# Patient Record
Sex: FEMALE | Race: WHITE | NOT HISPANIC OR LATINO | Employment: OTHER | URBAN - METROPOLITAN AREA
[De-identification: names, ages, dates, MRNs, and addresses within clinical notes are randomized per-mention and may not be internally consistent; named-entity substitution may affect disease eponyms.]

---

## 2018-01-01 ENCOUNTER — TRANSFERRED RECORDS (OUTPATIENT)
Dept: MULTI SPECIALTY CLINIC | Facility: CLINIC | Age: 73
End: 2018-01-01

## 2018-07-02 ENCOUNTER — TRANSFERRED RECORDS (OUTPATIENT)
Dept: HEALTH INFORMATION MANAGEMENT | Facility: CLINIC | Age: 73
End: 2018-07-02

## 2018-07-02 LAB
CHOLEST SERPL-MCNC: 261 MG/DL (ref 131–200)
CREAT SERPL-MCNC: 0.68 MG/DL (ref 0.55–1.3)
GFR SERPL CREATININE-BSD FRML MDRD: >60 ML/MIN/1.73 M2
GLUCOSE SERPL-MCNC: 91 MG/DL (ref 74–106)
HDLC SERPL-MCNC: 77 MG/DL (ref 40–60)
LDLC SERPL CALC-MCNC: 158 MG/DL (ref 0–130)
POTASSIUM SERPL-SCNC: 4.3 MMOL/L (ref 3.6–5)
TRIGL SERPL-MCNC: 130 MG/DL (ref 0–150)

## 2019-10-30 ENCOUNTER — ALLIED HEALTH/NURSE VISIT (OUTPATIENT)
Dept: NURSING | Facility: CLINIC | Age: 74
End: 2019-10-30
Payer: MEDICARE

## 2019-10-30 DIAGNOSIS — Z23 NEED FOR PROPHYLACTIC VACCINATION AND INOCULATION AGAINST INFLUENZA: Primary | ICD-10-CM

## 2019-10-30 PROCEDURE — G0008 ADMIN INFLUENZA VIRUS VAC: HCPCS

## 2019-10-30 PROCEDURE — 90662 IIV NO PRSV INCREASED AG IM: CPT

## 2019-10-30 PROCEDURE — 99207 ZZC NO CHARGE NURSE ONLY: CPT

## 2020-07-06 ENCOUNTER — OFFICE VISIT (OUTPATIENT)
Dept: FAMILY MEDICINE | Facility: CLINIC | Age: 75
End: 2020-07-06
Payer: MEDICARE

## 2020-07-06 VITALS
OXYGEN SATURATION: 97 % | SYSTOLIC BLOOD PRESSURE: 172 MMHG | HEIGHT: 60 IN | DIASTOLIC BLOOD PRESSURE: 106 MMHG | BODY MASS INDEX: 37.69 KG/M2 | HEART RATE: 106 BPM | WEIGHT: 192 LBS | TEMPERATURE: 98.6 F

## 2020-07-06 DIAGNOSIS — Z00.00 ENCOUNTER FOR MEDICARE ANNUAL WELLNESS EXAM: Primary | ICD-10-CM

## 2020-07-06 DIAGNOSIS — F41.9 ANXIETY: ICD-10-CM

## 2020-07-06 DIAGNOSIS — Z13.220 LIPID SCREENING: ICD-10-CM

## 2020-07-06 DIAGNOSIS — Z13.1 SCREENING FOR DIABETES MELLITUS: ICD-10-CM

## 2020-07-06 DIAGNOSIS — F33.41 RECURRENT MAJOR DEPRESSIVE DISORDER, IN PARTIAL REMISSION (H): ICD-10-CM

## 2020-07-06 DIAGNOSIS — I10 BENIGN ESSENTIAL HYPERTENSION: ICD-10-CM

## 2020-07-06 DIAGNOSIS — E66.01 MORBID OBESITY (H): ICD-10-CM

## 2020-07-06 PROCEDURE — 96127 BRIEF EMOTIONAL/BEHAV ASSMT: CPT | Performed by: NURSE PRACTITIONER

## 2020-07-06 PROCEDURE — 36415 COLL VENOUS BLD VENIPUNCTURE: CPT | Performed by: NURSE PRACTITIONER

## 2020-07-06 PROCEDURE — 80053 COMPREHEN METABOLIC PANEL: CPT | Performed by: NURSE PRACTITIONER

## 2020-07-06 PROCEDURE — 80061 LIPID PANEL: CPT | Performed by: NURSE PRACTITIONER

## 2020-07-06 PROCEDURE — G0438 PPPS, INITIAL VISIT: HCPCS | Performed by: NURSE PRACTITIONER

## 2020-07-06 RX ORDER — ESCITALOPRAM OXALATE 10 MG/1
10 TABLET ORAL DAILY
Qty: 90 TABLET | Refills: 3 | Status: SHIPPED | OUTPATIENT
Start: 2020-07-06 | End: 2021-07-06

## 2020-07-06 RX ORDER — LORAZEPAM 0.5 MG/1
0.5 TABLET ORAL DAILY PRN
Qty: 30 TABLET | Refills: 0 | Status: SHIPPED | OUTPATIENT
Start: 2020-07-06 | End: 2020-10-08

## 2020-07-06 RX ORDER — ESCITALOPRAM OXALATE 10 MG/1
10 TABLET ORAL DAILY
COMMUNITY
End: 2020-07-06

## 2020-07-06 RX ORDER — LISINOPRIL 5 MG/1
5 TABLET ORAL DAILY
COMMUNITY
End: 2021-02-12

## 2020-07-06 RX ORDER — VALACYCLOVIR HYDROCHLORIDE 1 G/1
1000 TABLET, FILM COATED ORAL 2 TIMES DAILY
COMMUNITY
End: 2022-10-13

## 2020-07-06 RX ORDER — LISINOPRIL 5 MG/1
5 TABLET ORAL DAILY
Qty: 90 TABLET | Refills: 3 | Status: SHIPPED | OUTPATIENT
Start: 2020-07-06 | End: 2021-06-07

## 2020-07-06 ASSESSMENT — ANXIETY QUESTIONNAIRES
1. FEELING NERVOUS, ANXIOUS, OR ON EDGE: SEVERAL DAYS
2. NOT BEING ABLE TO STOP OR CONTROL WORRYING: SEVERAL DAYS
4. TROUBLE RELAXING: SEVERAL DAYS
6. BECOMING EASILY ANNOYED OR IRRITABLE: SEVERAL DAYS
5. BEING SO RESTLESS THAT IT IS HARD TO SIT STILL: SEVERAL DAYS
GAD7 TOTAL SCORE: 7
4. TROUBLE RELAXING: SEVERAL DAYS
1. FEELING NERVOUS, ANXIOUS, OR ON EDGE: SEVERAL DAYS
GAD7 TOTAL SCORE: 7
7. FEELING AFRAID AS IF SOMETHING AWFUL MIGHT HAPPEN: SEVERAL DAYS
3. WORRYING TOO MUCH ABOUT DIFFERENT THINGS: SEVERAL DAYS
IF YOU CHECKED OFF ANY PROBLEMS ON THIS QUESTIONNAIRE, HOW DIFFICULT HAVE THESE PROBLEMS MADE IT FOR YOU TO DO YOUR WORK, TAKE CARE OF THINGS AT HOME, OR GET ALONG WITH OTHER PEOPLE: SOMEWHAT DIFFICULT
2. NOT BEING ABLE TO STOP OR CONTROL WORRYING: SEVERAL DAYS
6. BECOMING EASILY ANNOYED OR IRRITABLE: SEVERAL DAYS
5. BEING SO RESTLESS THAT IT IS HARD TO SIT STILL: SEVERAL DAYS
7. FEELING AFRAID AS IF SOMETHING AWFUL MIGHT HAPPEN: SEVERAL DAYS
3. WORRYING TOO MUCH ABOUT DIFFERENT THINGS: SEVERAL DAYS

## 2020-07-06 ASSESSMENT — ASTHMA QUESTIONNAIRES
QUESTION_1 LAST FOUR WEEKS HOW MUCH OF THE TIME DID YOUR ASTHMA KEEP YOU FROM GETTING AS MUCH DONE AT WORK, SCHOOL OR AT HOME: NONE OF THE TIME
ACUTE_EXACERBATION_TODAY: NO
QUESTION_3 LAST FOUR WEEKS HOW OFTEN DID YOUR ASTHMA SYMPTOMS (WHEEZING, COUGHING, SHORTNESS OF BREATH, CHEST TIGHTNESS OR PAIN) WAKE YOU UP AT NIGHT OR EARLIER THAN USUAL IN THE MORNING: NOT AT ALL
QUESTION_5 LAST FOUR WEEKS HOW WOULD YOU RATE YOUR ASTHMA CONTROL: COMPLETELY CONTROLLED
ACT_TOTALSCORE: 25
QUESTION_4 LAST FOUR WEEKS HOW OFTEN HAVE YOU USED YOUR RESCUE INHALER OR NEBULIZER MEDICATION (SUCH AS ALBUTEROL): NOT AT ALL
QUESTION_2 LAST FOUR WEEKS HOW OFTEN HAVE YOU HAD SHORTNESS OF BREATH: NOT AT ALL

## 2020-07-06 ASSESSMENT — PATIENT HEALTH QUESTIONNAIRE - PHQ9: SUM OF ALL RESPONSES TO PHQ QUESTIONS 1-9: 3

## 2020-07-06 ASSESSMENT — ACTIVITIES OF DAILY LIVING (ADL): CURRENT_FUNCTION: NO ASSISTANCE NEEDED

## 2020-07-06 ASSESSMENT — MIFFLIN-ST. JEOR: SCORE: 1282.41

## 2020-07-06 NOTE — LETTER
[facility]  [address], [place] [address] [phone]                    July 9, 2020    Cami BA Yue  [address] [facility] [address]      Dear Cami,    Here is a summary of your recent test results:    -LDL(bad) cholesterol level is elevated, and your triglycerides are elevated which can increase your heart disease risk.  A diet high in fat and simple carbohydrates, genetics and being overweight can contribute to this. ADVISE: exercising 150 minutes of aerobic exercise per week (30 minutes for 5 days per week or 50 minutes for 3 days per week are options), eating a low saturated fat/low carbohydrate diet, and omega-3 fatty acids (fish oil) 0094-4566 mg daily are helpful to improve this. In 6 months, you should recheck your fasting cholesterol panel by scheduling a lab-only appointment.     -Liver and gallbladder tests are normal (ALT,AST, Alk phos, bilirubin), kidney function is normal (Cr, GFR), sodium is normal, potassium is normal, calcium is normal, glucose is normal.     Right now risk calculator for cholesterol is scoring high, but this is likely due to uncontrolled BP at this time. We will plan to recheck levels in a few months and follow result.     Your test results are enclosed.      Please contact me if you have any questions.    In addition, here is a list of due or overdue Health Maintenance reminders.    Health Maintenance Due   Topic Date Due     Osteoporosis Screening  1945     Depression Action Plan  1945     Colorectal Cancer Screening  07/12/1953     Diptheria Tetanus Pertussis (DTAP/TDAP/TD) Vaccine (1 - Tdap) 07/12/1968     Zoster (Shingles) Vaccine (1 of 2) 07/12/1993     Pneumococcal Vaccine (1 of 2 - PCV13) 07/12/2008       Please call us at [phone] (or use [employer]) to address the above recommendations.            Thank you very much for trusting [facility]..     Healthy regards,      Tisha Trevino,  [facility]       Results for orders placed or performed in visit on 07/06/20   Lipid panel reflex to direct LDL Fasting     Status: Abnormal   Result Value Ref Range    Cholesterol 271 (H) <200 mg/dL    Triglycerides 214 (H) <150 mg/dL    HDL Cholesterol 69 >49 mg/dL    LDL Cholesterol Calculated 159 (H) <100 mg/dL    Non HDL Cholesterol 202 (H) <130 mg/dL   Comprehensive metabolic panel (BMP + Alb, Alk Phos, ALT, AST, Total. Bili, TP)     Status: None   Result Value Ref Range    Sodium 140 133 - 144 mmol/L    Potassium 4.2 3.4 - 5.3 mmol/L    Chloride 107 94 - 109 mmol/L    Carbon Dioxide 24 20 - 32 mmol/L    Anion Gap 9 3 - 14 mmol/L    Glucose 82 70 - 99 mg/dL    Urea Nitrogen 12 7 - 30 mg/dL    Creatinine 0.76 0.52 - 1.04 mg/dL    GFR Estimate 75 >60 mL/min/[1.73_m2]    GFR Estimate If Black 87 >60 mL/min/[1.73_m2]    Calcium 8.9 8.5 - 10.1 mg/dL    Bilirubin Total 0.3 0.2 - 1.3 mg/dL    Albumin 3.6 3.4 - 5.0 g/dL    Protein Total 7.7 6.8 - 8.8 g/dL    Alkaline Phosphatase 92 40 - 150 U/L    ALT 23 0 - 50 U/L    AST 18 0 - 45 U/L

## 2020-07-06 NOTE — PROGRESS NOTES
"SUBJECTIVE:   Cami Turner is a 74 year old female who presents for Preventive Visit.    Pt moved here from [place] 04/2019.    Are you in the first 12 months of your Medicare coverage?  No    Healthy Habits:    In general, how would you rate your overall health?  Very good    Frequency of exercise:  None    Do you usually eat at least 4 servings of fruit and vegetables a day, include whole grains    & fiber and avoid regularly eating high fat or \"junk\" foods?  Yes    Taking medications regularly:  Yes (when has them - ran out 1 year ago)    Barriers to taking medications:  Other ( was having health issues so did not get appt til now)    Medication side effects:  None    Ability to successfully perform activities of daily living:  No assistance needed    Home Safety:  No safety concerns identified    Hearing Impairment:  No hearing concerns    In the past 6 months, have you been bothered by leaking of urine?  No    In general, how would you rate your overall mental or emotional health?  Good      PHQ-2 Total Score:    Additional concerns today:  Yes (renew medication)    Do you feel safe in your environment? Yes    Have you ever done Advance Care Planning? (For example, a Health Directive, POLST, or a discussion with a medical provider or your loved ones about your wishes): Yes, patient states has an Advance Care Planning document and will bring a copy to the clinic.      Fall risk  Fallen 2 or more times in the past year?: No  Any fall with injury in the past year?: No  Cognitive Screening   1) Repeat 3 items (Leader, Season, Table)    2) Clock draw: NORMAL  3) 3 item recall: Recalls 3 objects  Results: 3 items recalled: COGNITIVE IMPAIRMENT LESS LIKELY    Mini-CogTM Copyright DOMINICK Correia. Licensed by the author for use in [facility]; reprinted with permission (hope@.[facility]). All rights reserved.      Do you have sleep apnea, excessive snoring or daytime drowsiness?: no    Reviewed and updated as " needed this visit by clinical staff  Tobacco  Allergies  Meds  Problems  Med Hx  Surg Hx  Fam Hx  Soc Hx          Reviewed and updated as needed this visit by Provider  Allergies  Meds  Problems        Social History     Tobacco Use     Smoking status: Never Smoker     Smokeless tobacco: Never Used   Substance Use Topics     Alcohol use: Yes     Comment: 3 drinks per week     If you drink alcohol do you typically have >3 drinks per day or >7 drinks per week? No    Alcohol Use 7/6/2020   Prescreen: >3 drinks/day or >7 drinks/week? No       Mammogram done on this date: 2 years ago (approximately), by this group: will be getting results faxed from previous clinic, results were normal.       Has been out of all mediation for approx 1 year    Hypertension Follow-up - Lisinopril - 5mg      Do you check your blood pressure regularly outside of the clinic? No     Are you following a low salt diet? Yes - somewhat low    Are your blood pressures ever more than 140 on the top number (systolic) OR more   than 90 on the bottom number (diastolic), for example 140/90? Unsure- not checked since ran out    Depression and Anxiety Follow-Up - Escitalopram  - 10mg, Ativan 0.5mg-PRNf    How are you doing with your depression since your last visit? Worsened since being off meds    How are you doing with your anxiety since your last visit?  Worsened since being off meds    Are you having other symptoms that might be associated with depression or anxiety? No    Have you had a significant life event? OTHER: taking care of       Do you have any concerns with your use of alcohol or other drugs? No    Social History     Tobacco Use     Smoking status: Never Smoker     Smokeless tobacco: Never Used   Substance Use Topics     Alcohol use: Yes     Comment: 3 drinks per week     Drug use: Never     PHQ 7/6/2020   PHQ-9 Total Score 3   Q9: Thoughts of better off dead/self-harm past 2 weeks Not at all     No flowsheet data  found.  Last PHQ-9 7/6/2020   1.  Little interest or pleasure in doing things 1   2.  Feeling down, depressed, or hopeless 1   3.  Trouble falling or staying asleep, or sleeping too much 0   4.  Feeling tired or having little energy 0   5.  Poor appetite or overeating 1   6.  Feeling bad about yourself 0   7.  Trouble concentrating 0   8.  Moving slowly or restless 0   Q9: Thoughts of better off dead/self-harm past 2 weeks 0   PHQ-9 Total Score 3   Difficulty at work, home, or with people Somewhat difficult     No flowsheet data found.       Medication Followup of Acyclovir - PRN - Cold sores    Taking Medication as prescribed: yes    Side Effects:  None    Medication Helping Symptoms:  yes    Asthma Follow-Up    Was ACT completed today?  Yes    ACT Total Scores 7/6/2020   ACT TOTAL SCORE (Goal Greater than or Equal to 20) 25   In the past 12 months, how many times did you visit the emergency room for your asthma without being admitted to the hospital? 0   In the past 12 months, how many times were you hospitalized overnight because of your asthma? 0         How many days per week do you miss taking your asthma controller medication?  I do not have an asthma controller medication    Please describe any recent triggers for your asthma: Patient is unaware of triggers - Spring and Fall    Have you had any Emergency Room Visits, Urgent Care Visits, or Hospital Admissions since your last office visit?  No        Suicide Assessment Five-step Evaluation and Treatment (SAFE-T)      Current providers sharing in care for this patient include:   Patient Care Team:  Clinic, [facility] as PCP - General    The following health maintenance items are reviewed in Epic and correct as of today:  Health Maintenance   Topic Date Due     DEXA  1945     ADVANCE CARE PLANNING  1945     COLORECTAL CANCER SCREENING  07/12/1953     DTAP/TDAP/TD IMMUNIZATION (1 - Tdap) 07/12/1968     LIPID  07/12/1988     ZOSTER  IMMUNIZATION (1 of 2) 07/12/1993     MEDICARE ANNUAL WELLNESS VISIT  07/12/2008     FALL RISK ASSESSMENT  07/12/2008     PNEUMOCOCCAL IMMUNIZATION 65+ LOW/MEDIUM RISK (1 of 2 - PCV13) 07/12/2008     PHQ-2  01/01/2020     INFLUENZA VACCINE (1) 09/01/2020     IPV IMMUNIZATION  Aged Out     MENINGITIS IMMUNIZATION  Aged Out     Lab work is in process  Up to date on screenings per report-await records transfer    Review of Systems  Constitutional, HEENT, cardiovascular, pulmonary, GI, , musculoskeletal, neuro, skin, endocrine and psych systems are negative, except as otherwise noted.    OBJECTIVE:   BP (!) 172/106 (BP Location: Right arm, Patient Position: Chair, Cuff Size: Adult Large)   Pulse 106   Temp 98.6  F (37  C) (Tympanic)   Ht 1.524 m (5')   Wt 87.1 kg (192 lb)   SpO2 97%   Breastfeeding No   BMI 37.50 kg/m   Estimated body mass index is 37.5 kg/m  as calculated from the following:    Height as of this encounter: 1.524 m (5').    Weight as of this encounter: 87.1 kg (192 lb).  Physical Exam  GENERAL APPEARANCE: healthy, alert and no distress  EYES: Eyes grossly normal to inspection, PERRL and conjunctivae and sclerae normal  HENT: ear canals and TM's normal, nose and mouth without ulcers or lesions, oropharynx clear and oral mucous membranes moist  NECK: no adenopathy, no asymmetry, masses, or scars and thyroid normal to palpation  RESP: lungs clear to auscultation - no rales, rhonchi or wheezes  BREAST: normal without masses, tenderness or nipple discharge and no palpable axillary masses or adenopathy  CV: regular rate and rhythm, normal S1 S2, no S3 or S4, no murmur, click or rub, no peripheral edema and peripheral pulses strong  ABDOMEN: soft, nontender, no hepatosplenomegaly, no masses and bowel sounds normal  MS: no musculoskeletal defects are noted and gait is age appropriate without ataxia  SKIN: no suspicious lesions or rashes  NEURO: Normal strength and tone, sensory exam grossly normal,  mentation intact and speech normal  PSYCH: mentation appears normal and affect normal/bright    Diagnostic Test Results:  Labs reviewed in Epic    ASSESSMENT / PLAN:   1. Encounter for Medicare annual wellness exam    2. Benign essential hypertension  Refill provided, will monitor BP and let us know if remains high.  - lisinopril (ZESTRIL) 5 MG tablet; Take 1 tablet (5 mg) by mouth daily  Dispense: 90 tablet; Refill: 3    3. Morbid obesity (H)  Continue healthy diet and exercise.     4. Screening for diabetes mellitus   Comprehensive metabolic panel (BMP + Alb, Alk Phos, ALT, AST, Total. Bili, TP)    5. Lipid screening  - Lipid panel reflex to direct LDL Fasting    6. Anxiety  Refill provided. Discussed short term use Lorazepam due to Beers criteria.  - LORazepam (ATIVAN) 0.5 MG tablet; Take 1 tablet (0.5 mg) by mouth daily as needed for anxiety  Dispense: 30 tablet; Refill: 0  - escitalopram (LEXAPRO) 10 MG tablet; Take 1 tablet (10 mg) by mouth daily  Dispense: 90 tablet; Refill: 3    7. Recurrent major depressive disorder, in partial remission (H)  Refill today. Titrate for better anxiety control.  - escitalopram (LEXAPRO) 10 MG tablet; Take 1 tablet (10 mg) by mouth daily  Dispense: 90 tablet; Refill: 3    COUNSELING:  Reviewed preventive health counseling, as reflected in patient instructions  Special attention given to:       Regular exercise       Healthy diet/nutrition       Fall risk prevention    Estimated body mass index is 37.5 kg/m  as calculated from the following:    Height as of this encounter: 1.524 m (5').    Weight as of this encounter: 87.1 kg (192 lb).    Weight management plan: Discussed healthy diet and exercise guidelines     reports that she has never smoked. She has never used smokeless tobacco.      Appropriate preventive services were discussed with this patient, including applicable screening as appropriate for cardiovascular disease, diabetes, osteopenia/osteoporosis, and glaucoma.  As  appropriate for age/gender, discussed screening for colorectal cancer, prostate cancer, breast cancer, and cervical cancer. Checklist reviewing preventive services available has been given to the patient.    Reviewed patients plan of care and provided an AVS. The Basic Care Plan (routine screening as documented in Health Maintenance) for Cami meets the Care Plan requirement. This Care Plan has been established and reviewed with the Patient.    Counseling Resources:  ATP IV Guidelines  Pooled Cohorts Equation Calculator  Breast Cancer Risk Calculator  FRAX Risk Assessment  ICSI Preventive Guidelines  Dietary Guidelines for Americans, 2010  USDA's MyPlate  ASA Prophylaxis  Lung CA Screening    Tisha Trevino, ZOE  [facility]    Identified Health Risks:

## 2020-07-06 NOTE — PATIENT INSTRUCTIONS
Patient Education   Personalized Prevention Plan  You are due for the preventive services outlined below.  Your care team is available to assist you in scheduling these services.  If you have already completed any of these items, please share that information with your care team to update in your medical record.  Health Maintenance Due   Topic Date Due     Osteoporosis Screening  1945     Discuss Advance Care Planning  1945     Colorectal Cancer Screening  07/12/1953     Diptheria Tetanus Pertussis (DTAP/TDAP/TD) Vaccine (1 - Tdap) 07/12/1968     Cholesterol Lab  07/12/1988     Zoster (Shingles) Vaccine (1 of 2) 07/12/1993     Annual Wellness Visit  07/12/2008     FALL RISK ASSESSMENT  07/12/2008     Pneumococcal Vaccine (1 of 2 - PCV13) 07/12/2008     PHQ-2  01/01/2020

## 2020-07-06 NOTE — Clinical Note
Mammogram done on this date: 2 years ago (approximately), by this group: [place], will be getting results faxed from previous clinic, results were normal.

## 2020-07-07 LAB
ALBUMIN SERPL-MCNC: 3.6 G/DL (ref 3.4–5)
ALP SERPL-CCNC: 92 U/L (ref 40–150)
ALT SERPL W P-5'-P-CCNC: 23 U/L (ref 0–50)
ANION GAP SERPL CALCULATED.3IONS-SCNC: 9 MMOL/L (ref 3–14)
AST SERPL W P-5'-P-CCNC: 18 U/L (ref 0–45)
BILIRUB SERPL-MCNC: 0.3 MG/DL (ref 0.2–1.3)
BUN SERPL-MCNC: 12 MG/DL (ref 7–30)
CALCIUM SERPL-MCNC: 8.9 MG/DL (ref 8.5–10.1)
CHLORIDE SERPL-SCNC: 107 MMOL/L (ref 94–109)
CHOLEST SERPL-MCNC: 271 MG/DL
CO2 SERPL-SCNC: 24 MMOL/L (ref 20–32)
CREAT SERPL-MCNC: 0.76 MG/DL (ref 0.52–1.04)
GFR SERPL CREATININE-BSD FRML MDRD: 75 ML/MIN/{1.73_M2}
GLUCOSE SERPL-MCNC: 82 MG/DL (ref 70–99)
HDLC SERPL-MCNC: 69 MG/DL
LDLC SERPL CALC-MCNC: 159 MG/DL
NONHDLC SERPL-MCNC: 202 MG/DL
POTASSIUM SERPL-SCNC: 4.2 MMOL/L (ref 3.4–5.3)
PROT SERPL-MCNC: 7.7 G/DL (ref 6.8–8.8)
SODIUM SERPL-SCNC: 140 MMOL/L (ref 133–144)
TRIGL SERPL-MCNC: 214 MG/DL

## 2020-07-07 ASSESSMENT — ANXIETY QUESTIONNAIRES: GAD7 TOTAL SCORE: 7

## 2020-07-07 ASSESSMENT — ASTHMA QUESTIONNAIRES: ACT_TOTALSCORE: 25

## 2020-07-09 NOTE — RESULT ENCOUNTER NOTE
Please send letter:    -LDL(bad) cholesterol level is elevated, and your triglycerides are elevated which can increase your heart disease risk.  A diet high in fat and simple carbohydrates, genetics and being overweight can contribute to this. ADVISE: exercising 150 minutes of aerobic exercise per week (30 minutes for 5 days per week or 50 minutes for 3 days per week are options), eating a low saturated fat/low carbohydrate diet, and omega-3 fatty acids (fish oil) 6254-1775 mg daily are helpful to improve this. In 6 months, you should recheck your fasting cholesterol panel by scheduling a lab-only appointment.    -Liver and gallbladder tests are normal (ALT,AST, Alk phos, bilirubin), kidney function is normal (Cr, GFR), sodium is normal, potassium is normal, calcium is normal, glucose is normal.    Right now risk calculator for cholesterol is scoring high, but this is likely due to uncontrolled BP at this time. We will plan to recheck levels in a few months and follow result.    Healthy Regards,    Tisha Trevino, CNP

## 2020-09-30 ENCOUNTER — ALLIED HEALTH/NURSE VISIT (OUTPATIENT)
Dept: NURSING | Facility: CLINIC | Age: 75
End: 2020-09-30
Payer: MEDICARE

## 2020-09-30 DIAGNOSIS — Z23 NEED FOR PROPHYLACTIC VACCINATION AND INOCULATION AGAINST INFLUENZA: Primary | ICD-10-CM

## 2020-09-30 PROCEDURE — 90662 IIV NO PRSV INCREASED AG IM: CPT

## 2020-09-30 PROCEDURE — 99207 ZZC NO CHARGE NURSE ONLY: CPT

## 2020-09-30 PROCEDURE — G0008 ADMIN INFLUENZA VIRUS VAC: HCPCS

## 2020-10-08 DIAGNOSIS — F41.9 ANXIETY: ICD-10-CM

## 2020-10-08 RX ORDER — LORAZEPAM 0.5 MG/1
0.5 TABLET ORAL DAILY PRN
Qty: 30 TABLET | Refills: 0 | Status: SHIPPED | OUTPATIENT
Start: 2020-10-08 | End: 2021-01-22

## 2020-10-08 NOTE — TELEPHONE ENCOUNTER
LORazepam (ATIVAN) 0.5 MG tablet  Last Written Prescription Date:  7/6/2020  Last Fill Quantity: 30,  # refills: 0   Last office visit: 7/6/2020 with prescribing provider:  Tisha Trevino CNP   Future Office Visit: NA     routing to provider for review.     Regi Harrison MA on 10/8/2020 at 11:57 AM

## 2020-10-12 DIAGNOSIS — F41.9 ANXIETY: ICD-10-CM

## 2020-10-12 NOTE — TELEPHONE ENCOUNTER
Reason for Call:  Medication or medication refill:    Do you use a [facility]?  Name [facility]    Name of the medication requested: LORazepam (ATIVAN) 0.5 MG tablet    Other request: no    Can we leave a detailed message on this number? YES    Phone number patient can be reached at: Home number on file [phone] (home)    Best Time: anytime    Call taken on 10/12/2020 at 4:49 PM by Lenore Mercedes

## 2020-10-13 NOTE — TELEPHONE ENCOUNTER
LORazepam (ATIVAN) 0.5 MG tablet          Last Written Prescription Date:  10-8-20  Last Fill Quantity: 30 tablet,  # refills: 0   Last office visit: 7/6/2020 with prescribing provider:  felipe Trevino     Future Office Visit:        Routing refill request to provider for review/approval because:  Drug not on the [facility], P or [facility] refill protocol or controlled substance

## 2020-10-14 RX ORDER — LORAZEPAM 0.5 MG/1
0.5 TABLET ORAL DAILY PRN
Qty: 30 TABLET | Refills: 0 | OUTPATIENT
Start: 2020-10-14

## 2021-01-21 DIAGNOSIS — F41.9 ANXIETY: ICD-10-CM

## 2021-01-21 NOTE — TELEPHONE ENCOUNTER
LORazepam (ATIVAN) 0.5 MG tablet            Last Written Prescription Date:  10-8-20  Last Fill Quantity: 30 tablet,  # refills: 0   Last office visit: 7/6/2020 with prescribing provider:  felipe Trevino.   Future Office Visit:        Routing refill request to provider for review/approval because:  Drug not on the [facility], P or [facility] refill protocol or controlled substance

## 2021-01-22 RX ORDER — LORAZEPAM 0.5 MG/1
0.5 TABLET ORAL DAILY PRN
Qty: 30 TABLET | Refills: 0 | Status: SHIPPED | OUTPATIENT
Start: 2021-01-22 | End: 2021-03-09

## 2021-02-09 DIAGNOSIS — I10 BENIGN ESSENTIAL HYPERTENSION: Primary | ICD-10-CM

## 2021-02-09 DIAGNOSIS — I10 BENIGN ESSENTIAL HYPERTENSION: ICD-10-CM

## 2021-02-09 RX ORDER — LISINOPRIL 5 MG/1
5 TABLET ORAL DAILY
Qty: 90 TABLET | Refills: 3 | Status: CANCELLED | OUTPATIENT
Start: 2021-02-09

## 2021-02-11 NOTE — TELEPHONE ENCOUNTER
PT has been out for 10 days. PT needs sent to [place], [facility] hasn't sent new one.  -Nabila Lucas

## 2021-02-12 RX ORDER — LISINOPRIL 5 MG/1
5 TABLET ORAL DAILY
Qty: 90 TABLET | Refills: 0 | Status: SHIPPED | OUTPATIENT
Start: 2021-02-12 | End: 2021-06-07

## 2021-03-05 DIAGNOSIS — F41.9 ANXIETY: ICD-10-CM

## 2021-03-05 NOTE — TELEPHONE ENCOUNTER
LORazepam (ATIVAN) 0.5 MG tablet            Last Written Prescription Date:  1.22.21  Last Fill Quantity: 30 tablet,  # refills: 0   Last office visit: 7/6/2020 with prescribing provider:  ADALI ZAMUDIO NP       Future Office Visit:        Routing refill request to provider for review/approval because:  Drug not on the [facility], P or [facility] refill protocol or controlled substance

## 2021-03-08 NOTE — TELEPHONE ENCOUNTER
Routing refill request to provider for review/approval because:  Drug not on the [facility] refill protocol         Yashira Carey RN  [facility]

## 2021-03-09 RX ORDER — LORAZEPAM 0.5 MG/1
0.5 TABLET ORAL DAILY PRN
Qty: 30 TABLET | Refills: 0 | Status: SHIPPED | OUTPATIENT
Start: 2021-03-09 | End: 2021-05-07

## 2021-05-04 ENCOUNTER — TELEPHONE (OUTPATIENT)
Dept: FAMILY MEDICINE | Facility: CLINIC | Age: 76
End: 2021-05-04

## 2021-05-04 NOTE — TELEPHONE ENCOUNTER
Reason for Call:  Form, our goal is to have forms completed with 72 hours, however, some forms may require a visit or additional information.    Type of letter, form or note:  medical - therapy continuation request for LORazepam (ATIVAN) 0.5 MG tablet    Who is the form from?: [facility] (if other please explain)    Where did the form come from: form was faxed in    What clinic location was the form placed at?: [place]    Where the form was placed: Tisha Trevino CNP Box/Folder    What number is listed as a contact on the form?: [phone]    Call taken on 5/4/2021 at 8:58 AM by Ying ARANDA

## 2021-05-06 DIAGNOSIS — F41.9 ANXIETY: ICD-10-CM

## 2021-05-06 NOTE — TELEPHONE ENCOUNTER
LORazepam (ATIVAN) 0.5 MG tablet    Last Written Prescription Date:  3.9.21  Last Fill Quantity: 30 tablet,  # refills: 0   Last office visit: 7/6/2020 with prescribing provider:  ADALI ZAMUDIO NP     Future Office Visit:             Routing refill request to provider for review/approval because:  Drug not on the [facility], P or [facility] refill protocol or controlled substance

## 2021-05-07 RX ORDER — LORAZEPAM 0.5 MG/1
0.5 TABLET ORAL DAILY PRN
Qty: 30 TABLET | Refills: 0 | Status: SHIPPED | OUTPATIENT
Start: 2021-05-07 | End: 2021-07-06

## 2021-06-05 ENCOUNTER — APPOINTMENT (OUTPATIENT)
Dept: CT IMAGING | Facility: CLINIC | Age: 76
End: 2021-06-05
Attending: NURSE PRACTITIONER
Payer: MEDICARE

## 2021-06-05 ENCOUNTER — HOSPITAL ENCOUNTER (EMERGENCY)
Facility: CLINIC | Age: 76
Discharge: HOME | End: 2021-06-06
Attending: NURSE PRACTITIONER | Admitting: NURSE PRACTITIONER
Payer: MEDICARE

## 2021-06-05 ENCOUNTER — NURSE TRIAGE (OUTPATIENT)
Dept: NURSING | Facility: CLINIC | Age: 76
End: 2021-06-05

## 2021-06-05 DIAGNOSIS — R42 DIZZINESS: ICD-10-CM

## 2021-06-05 DIAGNOSIS — I10 HTN (HYPERTENSION): ICD-10-CM

## 2021-06-05 LAB
ANION GAP SERPL CALCULATED.3IONS-SCNC: 8 MMOL/L (ref 3–14)
BUN SERPL-MCNC: 17 MG/DL (ref 7–30)
CALCIUM SERPL-MCNC: 9 MG/DL (ref 8.5–10.1)
CHLORIDE SERPL-SCNC: 104 MMOL/L (ref 94–109)
CO2 SERPL-SCNC: 24 MMOL/L (ref 20–32)
CREAT SERPL-MCNC: 0.74 MG/DL (ref 0.52–1.04)
ERYTHROCYTE [DISTWIDTH] IN BLOOD BY AUTOMATED COUNT: 13.6 % (ref 10–15)
GFR SERPL CREATININE-BSD FRML MDRD: 78 ML/MIN/{1.73_M2}
GLUCOSE SERPL-MCNC: 90 MG/DL (ref 70–99)
HCT VFR BLD AUTO: 38.2 % (ref 35–47)
HGB BLD-MCNC: 12.6 G/DL (ref 11.7–15.7)
MCH RBC QN AUTO: 30.3 PG (ref 26.5–33)
MCHC RBC AUTO-ENTMCNC: 33 G/DL (ref 31.5–36.5)
MCV RBC AUTO: 92 FL (ref 78–100)
PLATELET # BLD AUTO: 225 10E9/L (ref 150–450)
POTASSIUM SERPL-SCNC: 4 MMOL/L (ref 3.4–5.3)
RBC # BLD AUTO: 4.16 10E12/L (ref 3.8–5.2)
SODIUM SERPL-SCNC: 136 MMOL/L (ref 133–144)
TROPONIN I SERPL-MCNC: <0.015 UG/L (ref 0–0.04)
WBC # BLD AUTO: 6.2 10E9/L (ref 4–11)

## 2021-06-05 PROCEDURE — 70496 CT ANGIOGRAPHY HEAD: CPT

## 2021-06-05 PROCEDURE — 70450 CT HEAD/BRAIN W/O DYE: CPT

## 2021-06-05 PROCEDURE — 85027 COMPLETE CBC AUTOMATED: CPT | Performed by: NURSE PRACTITIONER

## 2021-06-05 PROCEDURE — 99285 EMERGENCY DEPT VISIT HI MDM: CPT | Mod: 25

## 2021-06-05 PROCEDURE — 96361 HYDRATE IV INFUSION ADD-ON: CPT

## 2021-06-05 PROCEDURE — 250N000011 HC RX IP 250 OP 636: Performed by: NURSE PRACTITIONER

## 2021-06-05 PROCEDURE — 80048 BASIC METABOLIC PNL TOTAL CA: CPT | Performed by: NURSE PRACTITIONER

## 2021-06-05 PROCEDURE — 93005 ELECTROCARDIOGRAM TRACING: CPT

## 2021-06-05 PROCEDURE — 96360 HYDRATION IV INFUSION INIT: CPT | Mod: 59

## 2021-06-05 PROCEDURE — 84484 ASSAY OF TROPONIN QUANT: CPT | Performed by: NURSE PRACTITIONER

## 2021-06-05 PROCEDURE — 250N000013 HC RX MED GY IP 250 OP 250 PS 637: Performed by: NURSE PRACTITIONER

## 2021-06-05 PROCEDURE — 258N000003 HC RX IP 258 OP 636: Performed by: NURSE PRACTITIONER

## 2021-06-05 PROCEDURE — 250N000009 HC RX 250: Performed by: NURSE PRACTITIONER

## 2021-06-05 RX ORDER — LISINOPRIL 5 MG/1
5 TABLET ORAL ONCE
Status: COMPLETED | OUTPATIENT
Start: 2021-06-05 | End: 2021-06-05

## 2021-06-05 RX ORDER — IOPAMIDOL 755 MG/ML
500 INJECTION, SOLUTION INTRAVASCULAR ONCE
Status: COMPLETED | OUTPATIENT
Start: 2021-06-05 | End: 2021-06-05

## 2021-06-05 RX ORDER — CLONIDINE HYDROCHLORIDE 0.1 MG/1
0.1 TABLET ORAL ONCE
Status: COMPLETED | OUTPATIENT
Start: 2021-06-05 | End: 2021-06-05

## 2021-06-05 RX ADMIN — LISINOPRIL 5 MG: 5 TABLET ORAL at 21:49

## 2021-06-05 RX ADMIN — IOPAMIDOL 70 ML: 755 INJECTION, SOLUTION INTRAVENOUS at 23:05

## 2021-06-05 RX ADMIN — CLONIDINE HYDROCHLORIDE 0.1 MG: 0.1 TABLET ORAL at 23:58

## 2021-06-05 RX ADMIN — SODIUM CHLORIDE 1000 ML: 9 INJECTION, SOLUTION INTRAVENOUS at 21:06

## 2021-06-05 RX ADMIN — SODIUM CHLORIDE 80 ML: 9 INJECTION, SOLUTION INTRAVENOUS at 23:05

## 2021-06-05 ASSESSMENT — ENCOUNTER SYMPTOMS
NAUSEA: 0
VOMITING: 0
ABDOMINAL PAIN: 0
FEVER: 0
DYSURIA: 0
DIZZINESS: 1
CHILLS: 0

## 2021-06-05 NOTE — ED TRIAGE NOTES
Patient arrives with c/o one episode of dizziness yesterday and another one today. Patient reports feeling unsteady yesterday that continued into today. Pt endorses high blood pressure at home 180's/110's. Pt denies dizziness during triage, no numbness or tingling, no weakness to extremities, no slurring of speech. Pt takes HTN meds. ABCs intact.

## 2021-06-05 NOTE — TELEPHONE ENCOUNTER
Patient reported dizzy spell earlier today and had some diarrhea.  Latest BP reading 185/114. Patient does report an unsteady gait, and caller will take patient to ER per guideline recommendation.    Sofie Walters RN  [facility]    Reason for Disposition    [1] Systolic BP  >= 160 OR Diastolic >= 100 AND [2] cardiac or neurologic symptoms (e.g., chest pain, difficulty breathing, unsteady gait, blurred vision)    Additional Information    Negative: Difficult to awaken or acting confused (e.g., disoriented, slurred speech)    Negative: Severe difficulty breathing (e.g., struggling for each breath, speaks in single words)    Negative: [1] Weakness of the face, arm or leg on one side of the body AND [2] new onset    Negative: [1] Numbness (i.e., loss of sensation) of the face, arm or leg on one side of the body AND [2] new onset    Negative: [1] Chest pain lasts > 5 minutes AND [2] history of heart disease  (i.e., heart attack, bypass surgery, angina, angioplasty, CHF)    Negative: [1] Chest pain AND [2] took nitrogylcerin AND [3] pain was not relieved    Negative: Sounds like a life-threatening emergency to the triager    Protocols used: HIGH BLOOD PRESSURE-A-AH

## 2021-06-06 VITALS
HEART RATE: 64 BPM | TEMPERATURE: 98 F | DIASTOLIC BLOOD PRESSURE: 82 MMHG | OXYGEN SATURATION: 95 % | SYSTOLIC BLOOD PRESSURE: 156 MMHG | RESPIRATION RATE: 19 BRPM

## 2021-06-06 RX ORDER — CLONIDINE HYDROCHLORIDE 0.1 MG/1
0.1 TABLET ORAL 3 TIMES DAILY PRN
Qty: 9 TABLET | Refills: 0 | Status: SHIPPED | OUTPATIENT
Start: 2021-06-06 | End: 2021-06-07

## 2021-06-06 NOTE — ED PROVIDER NOTES
History     Chief Complaint:  Dizziness and Hypertension      HPI   Cami Turner is a 75 year old female who presents with patient presents with episode of dizziness and hypertension.  She had dizziness episode yesterday as well as today.  She states that today she was outside for a few minutes looking at new porch furniture and when she returned to the house became lightheaded and dizzy.  She denies the symptoms at rest.  There was no difficulty speaking, numbness, weakness, tingling, or visual changes.  She did not fall.  She takes her hypertension medications in the evening and had not yet taken them today.  No other concerns or complaints at this time.    Review of Systems   Constitutional: Negative for chills and fever.   Cardiovascular: Negative for chest pain.   Gastrointestinal: Negative for abdominal pain, nausea and vomiting.   Genitourinary: Negative for dysuria.   Neurological: Positive for dizziness.   All other systems reviewed and are negative.        Allergies:    No Known Allergies      Medications:      escitalopram (LEXAPRO) 10 MG tablet  lisinopril (ZESTRIL) 5 MG tablet  lisinopril (ZESTRIL) 5 MG tablet  LORazepam (ATIVAN) 0.5 MG tablet  valACYclovir (VALTREX) 1000 mg tablet        Past Medical History:      No past medical history on file.  Patient Active Problem List    Diagnosis Date Noted     Benign essential hypertension 07/06/2020     Priority: Medium     Morbid obesity (H) 07/06/2020     Priority: Medium        Past Surgical History:      Past Surgical History:   Procedure Laterality Date     ANKLE SURGERY Left      HIP SURGERY Right        Family History:        No family history on file.    Social History:  presents with family  Denies alcohol use    Physical Exam     Patient Vitals for the past 24 hrs:   BP Temp Temp src Pulse Resp SpO2   06/06/21 0000 (!) 156/82 -- -- -- -- 95 %   06/05/21 2300 -- -- -- 62 19 98 %   06/05/21 2245 -- -- -- 69 13 96 %   06/05/21 2233 (!) 199/101 -- --  62 8 96 %   06/05/21 2230 (!) 194/98 -- -- 63 15 95 %   06/05/21 2215 (!) 193/96 -- -- 60 13 94 %   06/05/21 2200 (!) 172/100 -- -- 60 15 95 %   06/05/21 2145 (!) 156/90 -- -- 61 14 96 %   06/05/21 2130 (!) 148/113 -- -- 61 23 96 %   06/05/21 2115 (!) 161/121 -- -- 60 14 96 %   06/05/21 2100 (!) 178/108 -- -- 65 10 96 %   06/05/21 2045 (!) 180/137 -- -- 61 19 96 %   06/05/21 2029 (!) 150/82 -- -- 73 -- 98 %   06/05/21 1858 (!) 192/98 98  F (36.7  C) Temporal 77 18 96 %       Physical Exam  General:  Alert, No obvious discomfort, well kept  HEENT:  Normal Voice, PERRL, EOM normal, oropharynx is normal, Uvula is midline, Conjunctivae and sclerae are normal, No lymphadenopathy    Neck: Normal range of motion, No meningismus. There is no midline cervical spine pain/tenderness. No mass is detected  CV:  Regular rate and underlying rhythm, Normal Peripheral pulses. No murmurs, rubs or clicks  Resp: Lungs are clear, No tachypnea, Non-labored breathing, No wheezing, crackles, or rales   GI:  Abdomen is soft, there is no rigidity, No distension, No tympani, No rebound tenderness, Non-surgical without peritoneal features    MS:  No major joint effusions, No asymmetric leg swelling. No calf tenderness  Skin:  No rash or acute skin lesions noted, Warm, Dry  Neuro:    National Institutes of [employer] Stroke Scale  Exam Interval: Baseline   Score    Level of consciousness: (0)   Alert, keenly responsive    LOC questions: (0)   Answers both questions correctly    LOC commands: (0)   Performs both tasks correctly    Best gaze: (0)   Normal    Visual: (0)   No visual loss    Facial palsy: (0)   Normal symmetrical movements    Motor arm (left): (0)   No drift    Motor arm (right): (0)   No drift    Motor leg (left): (0)   No drift    Motor leg (right): (0)   No drift    Limb ataxia: (0)   Absent    Sensory: (0)   Normal- no sensory loss    Best language: (0)   Normal- no aphasia    Dysarthria: (0)   Normal    Extinction and inattention:  (0)   No abnormality        Total Score:  0   Psych:  Awake. Alert.  Normal affect.  Appropriate interactions. Good eye contact              Emergency Department Course   ECG:  ECG taken at 2029, ECG read at 2031  NSR Normal EKG  Rate 68 bpm. NM interval 156 ms. QRS duration 88 ms. QT/QTc 408/4333 ms. P-R-T axes 47 99 44.     Imaging:  CTA Head Neck with Contrast   Final Result   IMPRESSION:    HEAD CT:   1.  No discrete mass lesion, hemorrhage or focal area suggestive of acute infarct.   2.  Mild diffuse small vessel ischemic disease.   3.  Ventriculomegaly suggestive of normal pressure hydrocephalus. Recommend clinical correlation.      HEAD CTA:    1.  No discrete vessel occlusion, aneurysm, significant stenosis or high flow vascular malformation involving the arteries of the [place].      NECK CTA:   1.  Bovine arch configuration of the great vessels off the aortic arch with no significant stenosis of their origins.   2.  No significant stenosis or irregularity involving arteries of the neck by NASCET criteria.   3.  No right radiographic evidence of dissection.      Head CT w/o contrast   Final Result   IMPRESSION:    HEAD CT:   1.  No discrete mass lesion, hemorrhage or focal area suggestive of acute infarct.   2.  Mild diffuse small vessel ischemic disease.   3.  Ventriculomegaly suggestive of normal pressure hydrocephalus. Recommend clinical correlation.      HEAD CTA:    1.  No discrete vessel occlusion, aneurysm, significant stenosis or high flow vascular malformation involving the arteries of the [place].      NECK CTA:   1.  Bovine arch configuration of the great vessels off the aortic arch with no significant stenosis of their origins.   2.  No significant stenosis or irregularity involving arteries of the neck by NASCET criteria.   3.  No right radiographic evidence of dissection.          Laboratory:  Recent Results (from the past 8 hour(s))   EKG 12 lead    Collection Time: 06/05/21   8:28 PM   Result Value Ref Range    Interpretation ECG Click View Image link to view waveform and result    Troponin I    Collection Time: 06/05/21  8:56 PM   Result Value Ref Range    Troponin I ES <0.015 0.000 - 0.045 ug/L   CBC (platelets, no diff)    Collection Time: 06/05/21  8:56 PM   Result Value Ref Range    WBC 6.2 4.0 - 11.0 10e9/L    RBC Count 4.16 3.8 - 5.2 10e12/L    Hemoglobin 12.6 11.7 - 15.7 g/dL    Hematocrit 38.2 35.0 - 47.0 %    MCV 92 78 - 100 fl    MCH 30.3 26.5 - 33.0 pg    MCHC 33.0 31.5 - 36.5 g/dL    RDW 13.6 10.0 - 15.0 %    Platelet Count 225 150 - 450 10e9/L   Basic metabolic panel    Collection Time: 06/05/21  8:56 PM   Result Value Ref Range    Sodium 136 133 - 144 mmol/L    Potassium 4.0 3.4 - 5.3 mmol/L    Chloride 104 94 - 109 mmol/L    Carbon Dioxide 24 20 - 32 mmol/L    Anion Gap 8 3 - 14 mmol/L    Glucose 90 70 - 99 mg/dL    Urea Nitrogen 17 7 - 30 mg/dL    Creatinine 0.74 0.52 - 1.04 mg/dL    GFR Estimate 78 >60 mL/min/[1.73_m2]    GFR Estimate If Black >90 >60 mL/min/[1.73_m2]    Calcium 9.0 8.5 - 10.1 mg/dL         Emergency Department Course:    Reviewed:  I reviewed nursing notes, vitals, past history and care everywhere    Assessments:   I obtained history and examined the patient as noted above.    I rechecked the patient and explained findings.       Recheck.  I discussed the laboratory and radiology results with the patient and she understands.  The patient felt improved after the above interventions.  Ambulatory without difficulty or dizziness.  Feels that she is safe at home.  The patient will be discharged home to follow up with primary care doctor per discharge instructions.  Indications for return to the ED were discussed and the patient understands.  All questions were answered prior to discharge.     Interventions:  Medications   0.9% sodium chloride BOLUS (1,000 mLs Intravenous New Bag 6/5/21 7985)   lisinopril (ZESTRIL) tablet 5 mg (5 mg Oral Given 6/5/21 6077)    CT Flush 100mL Saline (80 mLs Intravenous Given 6/5/21 7935)   iopamidol (ISOVUE-370) solution 500 mL (70 mLs Intravenous Given 6/5/21 2305)   cloNIDine (CATAPRES) tablet 0.1 mg (0.1 mg Oral Given 6/5/21 8013)       Disposition:  The patient was discharged to home.    Impression & Plan      Medical Decision Making:  Cami Turner is a 75 year old female who presents today for evaluation of dizziness.  She has had 1 episode each of the last 2 days.  After having another episode today he was concerned and presented for evaluation.  Her examination showed no focal neurologic deficit.  Dizziness occurred with orthostatic changes.  Her laboratory studies are noncontributory.  She has a negative troponin and nonacute EKG.  Again no focal neurologic deficit given the dizziness I did obtain a CT CTA as she is unable to get MRI due to metal plates in her body.  There was no indication for acute process.  No clinical signs of normal pressure hydrocephalus.  Patient currently on very low-dose lisinopril.  This is not managing her blood pressure.  I gave her a dose of clonidine here which did help reduce her blood pressure somewhat.  I discharged her with a prescription for clonidine to use if blood pressure is going over 165.  She is advised for follow-up with her primary care provider this week and to keep a log of her blood pressure readings.      Covid-19  Cami Turner was evaluated during a global COVID-19 pandemic, which necessitated consideration that the patient might be at risk for infection with the SARS-CoV-2 virus that causes COVID-19.   Applicable protocols for evaluation were followed during the patient's care.   COVID-19 was considered as part of the patient's evaluation.     Diagnosis:    ICD-10-CM    1. Dizziness  R42    2. HTN (hypertension)  I10        Discharge Medications:  New Prescriptions    No medications on file          Suleiman Bolaños APRN CNP  06/06/21 0038

## 2021-06-06 NOTE — DISCHARGE INSTRUCTIONS
Again you may take a second dose of your lisinopril tonight.  Do not take an extra clonidine.  I have given you a prescription for clonidine.  You may take 1 of these if you find your blood pressure is greater than 165 for the top number.  You can take this up to 3 times a day.    Make sure and take your blood pressure 3 times a day for the next week and keep a record.  Give your primary care provider a call on Monday to schedule follow-up.

## 2021-06-07 ENCOUNTER — OFFICE VISIT (OUTPATIENT)
Dept: FAMILY MEDICINE | Facility: CLINIC | Age: 76
End: 2021-06-07
Payer: MEDICARE

## 2021-06-07 VITALS
TEMPERATURE: 97.8 F | DIASTOLIC BLOOD PRESSURE: 88 MMHG | WEIGHT: 188 LBS | RESPIRATION RATE: 16 BRPM | OXYGEN SATURATION: 97 % | HEIGHT: 60 IN | SYSTOLIC BLOOD PRESSURE: 132 MMHG | HEART RATE: 86 BPM | BODY MASS INDEX: 36.91 KG/M2

## 2021-06-07 DIAGNOSIS — I10 BENIGN ESSENTIAL HYPERTENSION: ICD-10-CM

## 2021-06-07 LAB — INTERPRETATION ECG - MUSE: NORMAL

## 2021-06-07 PROCEDURE — 99213 OFFICE O/P EST LOW 20 MIN: CPT | Performed by: NURSE PRACTITIONER

## 2021-06-07 RX ORDER — LISINOPRIL 5 MG/1
15 TABLET ORAL DAILY
Qty: 270 TABLET | Refills: 1 | Status: SHIPPED | OUTPATIENT
Start: 2021-06-07 | End: 2021-07-06

## 2021-06-07 RX ORDER — CLONIDINE HYDROCHLORIDE 0.1 MG/1
0.1 TABLET ORAL 3 TIMES DAILY PRN
Qty: 10 TABLET | Refills: 0 | Status: SHIPPED | OUTPATIENT
Start: 2021-06-07 | End: 2021-07-06

## 2021-06-07 ASSESSMENT — MIFFLIN-ST. JEOR: SCORE: 1259.26

## 2021-06-07 NOTE — PROGRESS NOTES
Assessment & Plan     Benign essential hypertension  Increase lisinopril to 15 mg for now. Schedule follow up in 2 weeks. Check BP and let us know if no improvement. Discussed need to take lisinopril daily to get baseline control, clonidine as needed.   - lisinopril (ZESTRIL) 5 MG tablet  Dispense: 270 tablet; Refill: 1  - cloNIDine (CATAPRES) 0.1 MG tablet  Dispense: 10 tablet; Refill: 0      Prescription drug management  No LOS data to display   Time spent doing chart review, history and exam, documentation and further activities per the note       BMI:   Estimated body mass index is 36.72 kg/m  as calculated from the following:    Height as of this encounter: 1.524 m (5').    Weight as of this encounter: 85.3 kg (188 lb).   Weight management plan: Discussed healthy diet and exercise guidelines    See Patient Instructions    No follow-ups on file.    Tisha Trevino [facility] PRIOR ENMANUEL Almanza is a 75 year old who presents for the following health issues     HPI     ED/ Followup:    Facility:  [facility]  Date of visit: 6/5/21  Reason for visit: HTN and Dizziness  Current Status: feels better- lightheaded when standing up - nausea - Clonidine when BP over 165  BP Readings from Last 3 Encounters:   06/07/21 132/88   06/06/21 (!) 156/82   07/06/20 (!) 172/106          BP looking a lot better today. Patient has been doing only clonidine. Discussed upping lisinopril slightly, needs to resume for baseline control. No side effects to clonidine.     Feeling well otherwise, only symptomatic when BP up. Labs and ekg, testing all normal in ED.     Review of Systems   Constitutional, HEENT, cardiovascular, pulmonary, GI, , musculoskeletal, neuro, skin, endocrine and psych systems are negative, except as otherwise noted.      Objective    /88   Pulse 86   Temp 97.8  F (36.6  C) (Tympanic)   Resp 16   Ht 1.524 m (5')   Wt 85.3 kg (188 lb)   SpO2 97%   BMI 36.72 kg/m    Body  mass index is 36.72 kg/m .  Physical Exam   GENERAL: healthy, alert and no distress  NECK: no adenopathy, no asymmetry, masses, or scars and thyroid normal to palpation  RESP: lungs clear to auscultation - no rales, rhonchi or wheezes  CV: regular rate and rhythm, normal S1 S2, no S3 or S4, no murmur, click or rub, no peripheral edema and peripheral pulses strong  ABDOMEN: soft, nontender, no hepatosplenomegaly, no masses and bowel sounds normal  MS: no gross musculoskeletal defects noted, no edema  NEURO: Normal strength and tone, mentation intact and speech normal    Cta Head Neck With Contrast    Result Date: 6/6/2021  EXAM: CT HEAD W/O CONTRAST, CTA  HEAD NECK WITH CONTRAST LOCATION: [facility] DATE/TIME: 6/5/2021 11:04 PM INDICATION: Dizziness, non-specific COMPARISON: None. CONTRAST: 70mL Isovue-370 TECHNIQUE: Head and neck CT angiogram with IV contrast. Noncontrast head CT followed by axial helical CT images of the head and neck vessels obtained during the arterial phase of intravenous contrast administration. Axial 2D reconstructed images and multiplanar 3D MIP reconstructed images of the head and neck vessels were performed by the technologist. Dose reduction techniques were used. All stenosis measurements made according to NASCET criteria unless otherwise specified. FINDINGS: NONCONTRAST HEAD CT: INTRACRANIAL CONTENTS: No intracranial hemorrhage, extraaxial collection, or mass effect.  No CT evidence of acute infarct. There is scattered low-attenuation within the periventricular and subcortical white matter consistent with mild diffuse small vessel ischemic disease. There is ventriculomegaly of lateral and third ventricles with the ventricular system out of proportion to the sulci highly suggestive of normal pressure hydrocephalus. There is a partially empty sella visualized. VISUALIZED ORBITS/SINUSES/MASTOIDS: No intraorbital abnormality. Opacification right maxillary sinus. No middle ear or  mastoid effusion. BONES/SOFT TISSUES: No acute abnormality. HEAD CTA: ANTERIOR CIRCULATION: No stenosis/occlusion, aneurysm, or high flow vascular malformation. Standard [place] anatomy. POSTERIOR CIRCULATION: No stenosis/occlusion, aneurysm, or high flow vascular malformation. Left vertebral artery dominant but both vertebral arteries connect up to the basilar artery. DURAL VENOUS SINUSES: Expected enhancement of the major dural venous sinuses. NECK CTA: RIGHT CAROTID: No measurable stenosis or dissection. LEFT CAROTID: No measurable stenosis or dissection. VERTEBRAL ARTERIES: No focal stenosis or dissection. Left vertebral artery dominant but both vertebral arteries are patent throughout the neck region. AORTIC ARCH: Bovine arch configuration of the great vessels off the aortic arch with no significant stenosis of their origins. NONVASCULAR STRUCTURES: Lung apices are clear. Mild diffuse degenerative changes of the cervical spine.     IMPRESSION: HEAD CT: 1.  No discrete mass lesion, hemorrhage or focal area suggestive of acute infarct. 2.  Mild diffuse small vessel ischemic disease. 3.  Ventriculomegaly suggestive of normal pressure hydrocephalus. Recommend clinical correlation. HEAD CTA: 1.  No discrete vessel occlusion, aneurysm, significant stenosis or high flow vascular malformation involving the arteries of the [place]. NECK CTA: 1.  Bovine arch configuration of the great vessels off the aortic arch with no significant stenosis of their origins. 2.  No significant stenosis or irregularity involving arteries of the neck by NASCET criteria. 3.  No right radiographic evidence of dissection.    Head Ct W/o Contrast    Result Date: 6/6/2021  EXAM: CT HEAD W/O CONTRAST, CTA  HEAD NECK WITH CONTRAST LOCATION: [facility] DATE/TIME: 6/5/2021 11:04 PM INDICATION: Dizziness, non-specific COMPARISON: None. CONTRAST: 70mL Isovue-370 TECHNIQUE: Head and neck CT angiogram with IV contrast.  Noncontrast head CT followed by axial helical CT images of the head and neck vessels obtained during the arterial phase of intravenous contrast administration. Axial 2D reconstructed images and multiplanar 3D MIP reconstructed images of the head and neck vessels were performed by the technologist. Dose reduction techniques were used. All stenosis measurements made according to NASCET criteria unless otherwise specified. FINDINGS: NONCONTRAST HEAD CT: INTRACRANIAL CONTENTS: No intracranial hemorrhage, extraaxial collection, or mass effect.  No CT evidence of acute infarct. There is scattered low-attenuation within the periventricular and subcortical white matter consistent with mild diffuse small vessel ischemic disease. There is ventriculomegaly of lateral and third ventricles with the ventricular system out of proportion to the sulci highly suggestive of normal pressure hydrocephalus. There is a partially empty sella visualized. VISUALIZED ORBITS/SINUSES/MASTOIDS: No intraorbital abnormality. Opacification right maxillary sinus. No middle ear or mastoid effusion. BONES/SOFT TISSUES: No acute abnormality. HEAD CTA: ANTERIOR CIRCULATION: No stenosis/occlusion, aneurysm, or high flow vascular malformation. Standard [place] of [place] anatomy. POSTERIOR CIRCULATION: No stenosis/occlusion, aneurysm, or high flow vascular malformation. Left vertebral artery dominant but both vertebral arteries connect up to the basilar artery. DURAL VENOUS SINUSES: Expected enhancement of the major dural venous sinuses. NECK CTA: RIGHT CAROTID: No measurable stenosis or dissection. LEFT CAROTID: No measurable stenosis or dissection. VERTEBRAL ARTERIES: No focal stenosis or dissection. Left vertebral artery dominant but both vertebral arteries are patent throughout the neck region. AORTIC ARCH: Bovine arch configuration of the great vessels off the aortic arch with no significant stenosis of their origins. NONVASCULAR STRUCTURES: Lung  apices are clear. Mild diffuse degenerative changes of the cervical spine.     IMPRESSION: HEAD CT: 1.  No discrete mass lesion, hemorrhage or focal area suggestive of acute infarct. 2.  Mild diffuse small vessel ischemic disease. 3.  Ventriculomegaly suggestive of normal pressure hydrocephalus. Recommend clinical correlation. HEAD CTA: 1.  No discrete vessel occlusion, aneurysm, significant stenosis or high flow vascular malformation involving the arteries of the [place] of [place]. NECK CTA: 1.  Bovine arch configuration of the great vessels off the aortic arch with no significant stenosis of their origins. 2.  No significant stenosis or irregularity involving arteries of the neck by NASCET criteria. 3.  No right radiographic evidence of dissection.    Patient states no symptoms signs of normal pressure hydrocephalus.

## 2021-07-06 ENCOUNTER — OFFICE VISIT (OUTPATIENT)
Dept: FAMILY MEDICINE | Facility: CLINIC | Age: 76
End: 2021-07-06
Payer: MEDICARE

## 2021-07-06 VITALS
RESPIRATION RATE: 20 BRPM | TEMPERATURE: 98.3 F | OXYGEN SATURATION: 98 % | WEIGHT: 190 LBS | DIASTOLIC BLOOD PRESSURE: 84 MMHG | BODY MASS INDEX: 37.11 KG/M2 | SYSTOLIC BLOOD PRESSURE: 136 MMHG | HEART RATE: 81 BPM

## 2021-07-06 DIAGNOSIS — E66.01 MORBID OBESITY (H): ICD-10-CM

## 2021-07-06 DIAGNOSIS — I10 BENIGN ESSENTIAL HYPERTENSION: Primary | ICD-10-CM

## 2021-07-06 DIAGNOSIS — F33.41 RECURRENT MAJOR DEPRESSIVE DISORDER, IN PARTIAL REMISSION (H): ICD-10-CM

## 2021-07-06 DIAGNOSIS — F41.9 ANXIETY: ICD-10-CM

## 2021-07-06 PROCEDURE — 99214 OFFICE O/P EST MOD 30 MIN: CPT | Performed by: NURSE PRACTITIONER

## 2021-07-06 RX ORDER — ALBUTEROL SULFATE 90 UG/1
2 AEROSOL, METERED RESPIRATORY (INHALATION) EVERY 6 HOURS
COMMUNITY
End: 2021-09-16

## 2021-07-06 RX ORDER — ESCITALOPRAM OXALATE 10 MG/1
10 TABLET ORAL DAILY
Qty: 90 TABLET | Refills: 3 | Status: SHIPPED | OUTPATIENT
Start: 2021-07-06 | End: 2022-05-27

## 2021-07-06 RX ORDER — LORAZEPAM 0.5 MG/1
0.5 TABLET ORAL DAILY PRN
Qty: 30 TABLET | Refills: 0 | Status: SHIPPED | OUTPATIENT
Start: 2021-07-06 | End: 2021-08-05

## 2021-07-06 RX ORDER — LISINOPRIL 20 MG/1
20 TABLET ORAL DAILY
Qty: 90 TABLET | Refills: 3 | Status: SHIPPED | OUTPATIENT
Start: 2021-07-06 | End: 2022-05-27

## 2021-07-06 NOTE — PROGRESS NOTES
Assessment & Plan      Benign essential hypertension  Up lisinopril to 20 mg daily. Let us know if readings changing at home.   - lisinopril (ZESTRIL) 20 MG tablet  Dispense: 90 tablet; Refill: 3    Anxiety  Refill of lorazepam needed. Mood doing well. Updated PHQ 9  - LORazepam (ATIVAN) 0.5 MG tablet  Dispense: 30 tablet; Refill: 0  - escitalopram (LEXAPRO) 10 MG tablet  Dispense: 90 tablet; Refill: 3    Recurrent major depressive disorder, in partial remission (H)  Refill lexapro mood doing well.   - escitalopram (LEXAPRO) 10 MG tablet  Dispense: 90 tablet; Refill: 3    Morbid obesity (H)  Continue to work on diet and exercise.         14 minutes spent on the date of the encounter doing chart review, review of outside records, review of test results, interpretation of tests, patient visit, documentation, discussion with other provider(s), discussion with family as needed for this visit.          See patient instructions.    Return in about 6 months (around 1/6/2022) for Routine preventive.    Tisha Trevino, ZOE      Subjective   Cami is a 75 year old who presents for the following health issues: follow up on blood pressure.    HPI     Hypertension Follow-up                                                                                                                                                                    Do you check your blood pressure regularly outside of the clinic? Yes     Are you following a low salt diet? Yes    Are your blood pressures ever more than 140 on the top number (systolic) OR more   than 90 on the bottom number (diastolic), for example 140/90? No      How many servings of fruits and vegetables do you eat daily?  2-3    On average, how many sweetened beverages do you drink each day (Examples: soda, juice, sweet tea, etc.  Do NOT count diet or artificially sweetened beverages)?   0    How many days per week do you exercise enough to make your heart beat faster? 3 or less    How many  minutes a day do you exercise enough to make your heart beat faster? 30 - 60    How many days per week do you miss taking your medication? 0    Previous episode of HTN seen in ED and hospital follow up. Was using clonidine for a bit, after last visit increased to 15 mg of lisinopril. Has had slightly labile -150/ at home. Usually relatively normal range with a few outliers. Tolerating medication well and no longer using clonidine. Recommend increase to 20 mg daily and leave until follow up.    Review of Systems   Constitutional, HEENT, cardiovascular, pulmonary, GI, , musculoskeletal, neuro, skin, endocrine and psych systems are negative, except as otherwise noted.      Objective    /84   Pulse 81   Temp 98.3  F (36.8  C)   Resp 20   Wt 86.2 kg (190 lb)   SpO2 98%   Breastfeeding No   BMI 37.11 kg/m    Body mass index is 37.11 kg/m .  Physical Exam   GENERAL: healthy, alert and no distress  RESP: lungs clear to auscultation - no rales, rhonchi or wheezes  CV: regular rate and rhythm, normal S1 S2, no S3 or S4, no murmur, click or rub, no peripheral edema and peripheral pulses strong  MS: no gross musculoskeletal defects noted, no edema    Admission on 06/05/2021, Discharged on 06/06/2021   Component Date Value Ref Range Status     Interpretation ECG 06/05/2021 Click View Image link to view waveform and result   Final     Troponin I ES 06/05/2021 <0.015  0.000 - 0.045 ug/L Final    Comment: The 99th percentile for upper reference range is 0.045 ug/L.  Troponin values   in the range of 0.045 - 0.120 ug/L may be associated with risks of adverse   clinical events.       WBC 06/05/2021 6.2  4.0 - 11.0 10e9/L Final     RBC Count 06/05/2021 4.16  3.8 - 5.2 10e12/L Final     Hemoglobin 06/05/2021 12.6  11.7 - 15.7 g/dL Final     Hematocrit 06/05/2021 38.2  35.0 - 47.0 % Final     MCV 06/05/2021 92  78 - 100 fl Final     MCH 06/05/2021 30.3  26.5 - 33.0 pg Final     MCHC 06/05/2021 33.0  31.5 -  36.5 g/dL Final     RDW 06/05/2021 13.6  10.0 - 15.0 % Final     Platelet Count 06/05/2021 225  150 - 450 10e9/L Final     Sodium 06/05/2021 136  133 - 144 mmol/L Final     Potassium 06/05/2021 4.0  3.4 - 5.3 mmol/L Final     Chloride 06/05/2021 104  94 - 109 mmol/L Final     Carbon Dioxide 06/05/2021 24  20 - 32 mmol/L Final     Anion Gap 06/05/2021 8  3 - 14 mmol/L Final     Glucose 06/05/2021 90  70 - 99 mg/dL Final     Urea Nitrogen 06/05/2021 17  7 - 30 mg/dL Final     Creatinine 06/05/2021 0.74  0.52 - 1.04 mg/dL Final     GFR Estimate 06/05/2021 78  >60 mL/min/[1.73_m2] Final    Comment: Non  GFR Calc  Starting 12/18/2018, serum creatinine based estimated GFR (eGFR) will be   calculated using the Chronic Kidney Disease Epidemiology Collaboration   (CKD-EPI) equation.       GFR Estimate If Black 06/05/2021 >90  >60 mL/min/[1.73_m2] Final    Comment:  GFR Calc  Starting 12/18/2018, serum creatinine based estimated GFR (eGFR) will be   calculated using the Chronic Kidney Disease Epidemiology Collaboration   (CKD-EPI) equation.       Calcium 06/05/2021 9.0  8.5 - 10.1 mg/dL Final

## 2021-08-02 DIAGNOSIS — F33.41 RECURRENT MAJOR DEPRESSIVE DISORDER, IN PARTIAL REMISSION (H): ICD-10-CM

## 2021-08-02 DIAGNOSIS — F41.9 ANXIETY: ICD-10-CM

## 2021-08-02 DIAGNOSIS — I10 BENIGN ESSENTIAL HYPERTENSION: ICD-10-CM

## 2021-08-02 RX ORDER — LISINOPRIL 20 MG/1
20 TABLET ORAL DAILY
Qty: 90 TABLET | Refills: 3 | Status: CANCELLED | OUTPATIENT
Start: 2021-08-02

## 2021-08-02 RX ORDER — ESCITALOPRAM OXALATE 10 MG/1
10 TABLET ORAL DAILY
Qty: 90 TABLET | Refills: 3 | Status: CANCELLED | OUTPATIENT
Start: 2021-08-02

## 2021-08-02 NOTE — TELEPHONE ENCOUNTER
Patient is calling to ask us to send these again because she didn't have her premium paid with [employer].  Also the albuterol inhaler is a new  request and it is patient reported.

## 2021-08-04 NOTE — TELEPHONE ENCOUNTER
escitalopram (LEXAPRO) 10 MG tablet 90 tablet 3 7/6/2021  No   Sig - Route: Take 1 tablet (10 mg) by mouth daily - Oral   Sent to pharmacy as: Escitalopram Oxalate 10 MG Oral Tablet (LEXAPRO)   Class: E-Prescribe   Order: [id number]   E-Prescribing Status: Receipt confirmed by pharmacy (7/6/2021 10:30 AM CDT)     lisinopril (ZESTRIL) 20 MG tablet 90 tablet 3 7/6/2021  --   Sig - Route: Take 1 tablet (20 mg) by mouth daily - Oral   Sent to pharmacy as: Lisinopril 20 MG Oral Tablet (ZESTRIL)   Class: E-Prescribe   Order: [id number]   E-Prescribing Status: Receipt confirmed by pharmacy (7/6/2021 10:30 AM CDT)     Above medications have refills on file with [facility].    Albuterol is listed as historical     Controlled Substance Refill Request for   LORazepam (ATIVAN) 0.5 MG tablet 30 tablet 0 7/6/2021  No   Sig - Route: Take 1 tablet (0.5 mg) by mouth daily as needed for anxiety - Oral   Sent to pharmacy as: LORazepam 0.5 MG Oral Tablet (ATIVAN)       Problem List Complete:  Yes    Last Written Prescription Date:  7/6/21  Last Fill Quantity: 30,   # refills: 0  Last Office Visit with [facility] primary care provider: 7/6/2021      Future Office visit:     Controlled substance agreement:   Encounter-Level CSA:    There are no encounter-level csa.     Patient-Level CSA:    There are no patient-level csa.         Last Urine Drug Screen: No results found for: CDAUT, No results found for: COMDAT, No results found for: THC13, PCP13, COC13, MAMP13, OPI13, AMP13, BZO13, TCA13, MTD13, BAR13, OXY13, PPX13, BUP13     Processing:  Rx to be electronically transmitted to pharmacy by provider     [email or web address]   checked in past 3 months?  No, route to RN     Routing refill request to provider for review/approval because:  Drug not on the [facility] refill protocol   Drug not active on patient's medication list    Tomasz CAPELLAN RN   [facility]

## 2021-08-05 RX ORDER — LORAZEPAM 0.5 MG/1
0.5 TABLET ORAL DAILY PRN
Qty: 30 TABLET | Refills: 0 | Status: SHIPPED | OUTPATIENT
Start: 2021-08-05 | End: 2021-08-30

## 2021-08-05 RX ORDER — ALBUTEROL SULFATE 90 UG/1
2 AEROSOL, METERED RESPIRATORY (INHALATION) EVERY 6 HOURS
Status: CANCELLED | OUTPATIENT
Start: 2021-08-05

## 2021-08-25 DIAGNOSIS — F41.9 ANXIETY: ICD-10-CM

## 2021-08-25 NOTE — TELEPHONE ENCOUNTER
LORazepam (ATIVAN) 0.5 MG tablet          Last Written Prescription Date:  8.5.21  Last Fill Quantity: 30 tablet,  # refills: 0   Last office visit: 7/6/2021 with prescribing provider:  ADALI ZAMUDIO NP       Future Office Visit:        Routing refill request to provider for review/approval because:  Drug not on the [facility] refill protocol or controlled substance

## 2021-08-27 NOTE — TELEPHONE ENCOUNTER
Routing refill request to provider for review/approval because:  Drug not on the [facility] refill protocol  Katia Titus RN, BSN

## 2021-08-30 RX ORDER — LORAZEPAM 0.5 MG/1
0.5 TABLET ORAL DAILY PRN
Qty: 30 TABLET | Refills: 0 | Status: SHIPPED | OUTPATIENT
Start: 2021-08-30 | End: 2022-02-24

## 2021-09-09 DIAGNOSIS — J45.998 SEASONAL ASTHMA: Primary | ICD-10-CM

## 2021-09-13 RX ORDER — ALBUTEROL SULFATE 90 UG/1
2 AEROSOL, METERED RESPIRATORY (INHALATION) EVERY 6 HOURS
OUTPATIENT
Start: 2021-09-13

## 2021-09-13 NOTE — TELEPHONE ENCOUNTER
I can't find where this medication came from. Does she need albuterol and if so for what diagnosis. No respiratory concerns on problem list.    Tisha Trevino, CNP

## 2021-09-13 NOTE — TELEPHONE ENCOUNTER
Routing refill request to provider for review/approval because:  Medication is reported/historical    Failed protocol.  please route to  team if patient needs an appointment     Vianca JEFFERSONRN BSN  [facility]  [phone]

## 2021-09-15 NOTE — TELEPHONE ENCOUNTER
Attempt # 1    Called # [phone]     Left a non detailed VM to call back at [phone] and ask for any available Triage Nurse.    Perla Alcantara RN  [facility]

## 2021-09-16 RX ORDER — ALBUTEROL SULFATE 90 UG/1
2 AEROSOL, METERED RESPIRATORY (INHALATION) EVERY 4 HOURS PRN
Qty: 18 G | Refills: 3 | Status: SHIPPED | OUTPATIENT
Start: 2021-09-16 | End: 2022-07-12

## 2021-09-16 NOTE — TELEPHONE ENCOUNTER
Pt returned call, noted she had originally been prescribed Albuterol from [facility] [place], and is using this medication 2 puffs every 4 hours, Seasonal Asthma. Pt noted she uses the medication about once a week. [facility].    Medication pended for review and advise or sign if agree.    Tomasz CAPELLAN RN   [facility]

## 2022-02-23 DIAGNOSIS — F41.9 ANXIETY: ICD-10-CM

## 2022-02-23 NOTE — TELEPHONE ENCOUNTER
LORazepam (ATIVAN) 0.5 MG tablet 30 tablet 0 8/30/2021  No   Sig - Route: Take 1 tablet (0.5 mg) by mouth daily as needed for anxiety - Oral   Sent to pharmacy as: LORazepam 0.5 MG Oral Tablet (ATIVAN)       Last office visit: 7/6/2021     Future Office Visit:        CSA -- Patient Level:    CSA: None found at the patient level.             Routing refill request to provider for review/approval because:  Drug not on the [facility] refill protocol     Mora Calderón RN, BSN  [facility]

## 2022-02-24 RX ORDER — LORAZEPAM 0.5 MG/1
0.5 TABLET ORAL DAILY PRN
Qty: 30 TABLET | Refills: 0 | Status: SHIPPED | OUTPATIENT
Start: 2022-02-24 | End: 2022-05-27

## 2022-05-26 DIAGNOSIS — I10 BENIGN ESSENTIAL HYPERTENSION: ICD-10-CM

## 2022-05-26 DIAGNOSIS — F33.41 RECURRENT MAJOR DEPRESSIVE DISORDER, IN PARTIAL REMISSION (H): ICD-10-CM

## 2022-05-26 DIAGNOSIS — F41.9 ANXIETY: ICD-10-CM

## 2022-05-27 RX ORDER — LISINOPRIL 20 MG/1
TABLET ORAL
Qty: 90 TABLET | Refills: 0 | Status: SHIPPED | OUTPATIENT
Start: 2022-05-27 | End: 2022-07-12

## 2022-05-27 RX ORDER — ESCITALOPRAM OXALATE 10 MG/1
TABLET ORAL
Qty: 90 TABLET | Refills: 0 | Status: ON HOLD | OUTPATIENT
Start: 2022-05-27 | End: 2022-10-16

## 2022-05-27 RX ORDER — LORAZEPAM 0.5 MG/1
TABLET ORAL
Qty: 30 TABLET | Refills: 0 | Status: ON HOLD | OUTPATIENT
Start: 2022-05-27 | End: 2022-10-17

## 2022-05-27 NOTE — TELEPHONE ENCOUNTER
Lorazepam controlled.   Routing refill request to provider for review/approval because:  Drug not on the [facility] refill protocol     LOV:  7/6/2021     No future appointments.     CSA -- Patient Level:    CSA: None found at the patient level.       Perla Alcantara RN  [facility]

## 2022-05-31 ENCOUNTER — TELEPHONE (OUTPATIENT)
Dept: FAMILY MEDICINE | Facility: CLINIC | Age: 77
End: 2022-05-31
Payer: MEDICARE

## 2022-05-31 DIAGNOSIS — J45.998 SEASONAL ASTHMA: Primary | ICD-10-CM

## 2022-05-31 RX ORDER — ALBUTEROL SULFATE 90 UG/1
2 AEROSOL, METERED RESPIRATORY (INHALATION) EVERY 6 HOURS
Qty: 18 G | Refills: 11 | Status: ON HOLD | OUTPATIENT
Start: 2022-05-31 | End: 2022-10-16

## 2022-05-31 NOTE — TELEPHONE ENCOUNTER
Clarification needed: patient's plan only cover ventolin HFA. Please change RX.        [facility] - [place], [place] - [address]  #[phone]  Fax #[phone]    Candelario Kumar

## 2022-05-31 NOTE — TELEPHONE ENCOUNTER
Reason for Call:  Form, our goal is to have forms completed with 72 hours, however, some forms may require a visit or additional information.    Type of letter, form or note:  medical    Who is the form from?: [employer] (if other please explain)    Where did the form come from: form was faxed in    What clinic location was the form placed at?: [facility]    Where the form was placed: Tisha Trevino Box/Folder    What number is listed as a contact on the form?: [phone]       Additional comments: Albuterol    Call taken on 5/31/2022 at 7:27 AM by Giselle Bradshaw

## 2022-07-01 ENCOUNTER — OFFICE VISIT (OUTPATIENT)
Dept: FAMILY MEDICINE | Facility: CLINIC | Age: 77
End: 2022-07-01
Payer: MEDICARE

## 2022-07-01 ENCOUNTER — TELEPHONE (OUTPATIENT)
Dept: FAMILY MEDICINE | Facility: CLINIC | Age: 77
End: 2022-07-01

## 2022-07-01 VITALS
SYSTOLIC BLOOD PRESSURE: 130 MMHG | OXYGEN SATURATION: 96 % | DIASTOLIC BLOOD PRESSURE: 70 MMHG | TEMPERATURE: 98 F | HEIGHT: 60 IN | WEIGHT: 190 LBS | BODY MASS INDEX: 37.3 KG/M2 | RESPIRATION RATE: 18 BRPM | HEART RATE: 91 BPM

## 2022-07-01 DIAGNOSIS — L21.9 SEBORRHEIC DERMATITIS: Primary | ICD-10-CM

## 2022-07-01 PROCEDURE — 99213 OFFICE O/P EST LOW 20 MIN: CPT | Performed by: NURSE PRACTITIONER

## 2022-07-01 RX ORDER — TRIAMCINOLONE ACETONIDE 0.25 MG/G
CREAM TOPICAL 2 TIMES DAILY
Qty: 30 G | Refills: 0 | Status: ON HOLD | OUTPATIENT
Start: 2022-07-01 | End: 2022-10-16

## 2022-07-01 ASSESSMENT — PATIENT HEALTH QUESTIONNAIRE - PHQ9
SUM OF ALL RESPONSES TO PHQ QUESTIONS 1-9: 0
10. IF YOU CHECKED OFF ANY PROBLEMS, HOW DIFFICULT HAVE THESE PROBLEMS MADE IT FOR YOU TO DO YOUR WORK, TAKE CARE OF THINGS AT HOME, OR GET ALONG WITH OTHER PEOPLE: NOT DIFFICULT AT ALL
SUM OF ALL RESPONSES TO PHQ QUESTIONS 1-9: 0

## 2022-07-01 NOTE — TELEPHONE ENCOUNTER
Central Prior Authorization Team   Phone: [phone]      PRIOR AUTHORIZATION DENIED    Medication: fluocinolone acetonide 0.01 % SHAM - EPA DENIED     Denial Date: 7/1/2022    Denial Rational: Case ID: [id number] Appeal supported: No   Note from payer: The drug you asked for is non-formulary (not on [facility] list of preferred drugs). Before the drug can be covered, we need more information. Please ask your prescriber to explain to [facility] why the preferred drugs have not worked for your medical condition and/or would have bad side effects. If you have not tried the preferred drugs, triamcinolone acetonide lotion, mometasone topical solution, betamethasone valerate lotion AND hydrocortisone lotion, please talk to your health care provider about prescribing one of these for you. Some preferred drugs may require an additional review and approval by [facility]. Additionally, some alternative drugs listed may be the same drugs with different strengths or forms. Under certain cases, [facility] may only require trial and failure of one strength or form of that drug. This determination was based on the [facility] Standard Non-Formulary Exceptions Coverage Policy.        Appeal Information: If the Provider/Patient would like to appeal this denial, please provide a letter of medical necessity explaining why Preferred Formulary medications are not appropriate in the treatment of the patient's condition; along with any previous therapies tried/failed.    Once completed, please route back to me directly: Anastasiia Rojo

## 2022-07-01 NOTE — PROGRESS NOTES
Assessment & Plan     Cami was seen today for derm problem.    Diagnoses and all orders for this visit:    Seborrheic dermatitis  -     triamcinolone (KENALOG) 0.025 % cream; Apply topically 2 times daily  -     fluocinolone acetonide 0.01 % SHAM; Apply to scalp once daily (lather and let sit for 5 minutes then wash off) for 7 days and then use as needed for flare ups      Return in about 2 weeks (around 7/15/2022) for No improvement or sooner with worsening symptoms.    Trina Milner, LYNDSEY [facility] PRIOR [facility]   Cami is a 76 year old, presenting for the following health issues:  Derm Problem      History of Present Illness       Reason for visit:  Skin condition    She eats 0-1 servings of fruits and vegetables daily.She consumes 1 sweetened beverage(s) daily.She exercises with enough effort to increase her heart rate 10 to 19 minutes per day.  She exercises with enough effort to increase her heart rate 4 days per week.   She is taking medications regularly.    Today's PHQ-9         PHQ-9 Total Score: 0    PHQ-9 Q9 Thoughts of better off dead/self-harm past 2 weeks :   Not at all    How difficult have these problems made it for you to do your work, take care of things at home, or get along with other people: Not difficult at all       Rash    Started at forehead and then moved to scalp and around eyes.    Mildly pruritic.  Scaly and flaky.  +Slightly red.    No previous similar rash.      Has not tried anything for it.  Denies any new shampoos or lotions.      Perm - May while in [place] and then returned had additional  treatment at [facility].       Onset/Duration: End of April  Description  Location: Face and Scalp  Character: blotchy, flakey  Itching: moderate  Intensity:  moderate  Progression of Symptoms:  same  Accompanying signs and symptoms:   Fever: no  Body aches or joint pain: no  Sore throat symptoms: no  Recent cold symptoms: no  History:           Previous  episodes of similar rash: None  New exposures:  None  Recent travel: [place] in the winter, has been back since mother's day.  Exposure to similar rash: no  Precipitating or alleviating factors: Baby Powder  Therapies tried and outcome: none    Review of Systems   Constitutional, HEENT, cardiovascular, pulmonary, gi and gu systems are negative, except as otherwise noted.      Objective    /70   Pulse 91   Temp 98  F (36.7  C) (Tympanic)   Resp 18   Ht 1.524 m (5')   Wt 86.2 kg (190 lb)   SpO2 96%   BMI 37.11 kg/m    Body mass index is 37.11 kg/m .  Physical Exam     GENERAL: healthy, alert and no distress  SKIN: bilateral eyes, forehead and scalp with scattered patches of mild erythema with overlying scaling and flaking skin  PSYCH: mentation appears normal, affect normal/bright                .  ..

## 2022-07-01 NOTE — PATIENT INSTRUCTIONS
Phil Gomez MD  [facility]  OFFICE: [phone]  ANS [facility]: [phone]    PROGRESS NOTE  7/30/2021    Subjective  Patient is doing well today. He is less confused today. He does have some abdominal distension. Lactulose is being continued. IR consulted for paracentesis.  Patient has been s/p 5700 cc of fluid removed.. Patient has poor synthetic function of the liver with progressive ascites and elevated AFP suggestive highly of hepatocellular carcinoma superimposed on alcohol induced liver cirrhosis      Scheduled Meds:   • [START ON 7/31/2021] amiodarone  200 mg Oral QAM   • cefTRIAXone (ROCEPHIN) IV  1,000 mg Intravenous Q24H   • WARFARIN - PHYSICIAN MONITORED 1 each  1 each Does not apply Q Evening   • pneumococcal 23-valent vaccine  0.5 mL Intramuscular Once   • ferrous sulfate  325 mg Oral Daily with breakfast   • furosemide  20 mg Oral BID   • lactulose  30 g Oral 4x Daily   • nadolol  20 mg Oral BID   • pantoprazole  40 mg Oral QAM AC   • spironolactone  25 mg Oral Daily   • sucralfate  1 g Oral BID   • warfarin  1.5 mg Oral Q Evening   • sodium chloride (PF)  2 mL Intracatheter 2 times per day     Continuous Infusions:   PRN Meds:.traMADol, sodium chloride    Physical Exam:    Visit Vitals  /74   Pulse (!) 57   Temp 97.5 °F (36.4 °C) (Oral)   Resp 18   Ht 5' 9\" (1.753 m)   Wt 115.5 kg (254 lb 10.1 oz)   SpO2 99%   BMI 37.60 kg/m²         General:  No apparent distress, appears comfortable  Skin:   Warm and dry, no rash, turgid; no petechiae  Head:   Normocephalic, Atraumatic  Eyes:   PERRLA, EOMI, Conjunctiva Pink+  HEENT:  No discharge, No JVD, Supple  Cardiovascular: Regular rate and rhythm, S1S2, no murmurs, rubs or gallop;  Pulmonary:  Clear to auscultation bilaterally, No W/R/R; Good effort  Abdomen:  Soft, non-tender, -distended; no HSM or mass; No R/G/R  Back:   No CVA Tenderness; No Stiffness/Spasm  Extremities::  No C/C, No Edema; No calf tenderness  Recommendations for Dry Skin Care:   Keep baths and showers SHORT, ideally less than 10 minutes  Always use lukewarm water when possible. Avoid very HOT or COLD water  Do NOT use bubble bath  Limit use of soaps. Focus on  dirty  areas-face, armpits, groin, and feet  Do not vigorously scrub when you cleanse your skin  After bathing, PAT your skin lightly with a towel. Do not rub or scrub when drying  ALWAYS apply a moisturizer immediately after bathing. This helps  lock-in  moisture  Reapply moisturizing cream at least twice daily to you whole body. Your doctor may recommend a lighter or heavier moisturizer based on your child s severity and the time of year.   Do not use products such as powders, perfumes, or colognes on your skin  Avoid saunas and steam baths. The temperature is too HOT.  Use unscented hypo-allergenic laundry products. If your skin is still very dry, you can try DOUBLE-RINSING your clothes.  Avoid tight or  scratchy  clothing such as wool  Always wash new clothes before wearing for the first time  Sometimes a humidifier or vaporizer, used at night, can help with dry skin. Remember to keep it clean to avoid mold growth.    Below is a list of products our providers recommend for gentle skin care:    Use moisturizing creams at least twice daily to the whole body. Your provider may recommend a lighter or heavier moisturizer based on your child s severity and that time of year it is.    Lighter, more pleasing to the feel moisturizers include products such as:   - Cetaphil, CeraVe, Aveeno and Vanicream Lite  Thicker agents include:   - Vanicream, Aquaphor ointment, Vaseline, and Eucerin    **Creams are more moisturizing than lotions**    Products should be fragrance-free soaps, creams, detergents:   - Mild Bar Soaps include: Vanicream, CeraVeFragrance Free Dove, Basis, Purpose, CeraVe, and Vanicream   - Mild Liquid Cleansers include: Cetaphil, Aquanil, Cerave and Aquaphor   - Laundry Products include:  All Free and Clear, Dreft, and Cheer Free     b/l  Neurologic:  CN II-XII Intact, fluent speech, No motor or sensory deficits+  Psychiatric:  Pleasant affect, appropriate insight and thought content      DATA REVIEW    HEM:  Recent Labs   Lab 07/30/21  0501 07/29/21  1427   WBC 4.7 6.9   HGB 10.0* 11.3*   HCT 32.7* 37.4*   PLT 92* 156   MCV 97.3 96.6       Chem:  Recent Labs   Lab 07/30/21  0501 07/29/21  1427   CO2 27 24   BUN 25* 21*   CREATININE 1.59* 1.70*   CALCIUM 8.0* 8.2*   MG  --  1.6*   AST 45* 55*   TSH 2.296  --        Coagulation:  Recent Labs   Lab 07/30/21  0501 07/29/21  1427   INR 2.3 2.3   PTT  --  31*       Diagnostics  Reviewed  Other Labs: Reviewed    Microbiology  Reviewed    Cardiology  Reviewed     Radiology  Reviewed    ASSESSMENT:  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 Patient has been subjected to further evaluation and paracentesis resulting in successful removal of 5700 cc.  2. Patient has elevated AFP and poor synthetic function of the liver indicating advanced cirrhosis with progressive ascites and possibility does exist that he has now developed hepatocellular carcinoma  3. He will need CT abdomen with contrast when okay with renal service  4. Gastroenterology service has been advised requested to see the patient to further evaluate and assist with the diagnostic evaluation and disposition, possibly to tertiary care center for transplantation .  5. Essential hypertension, optimize medical management and accordingly adjust medication to maximize medical  management  6. Chronic kidney disease stage III, question if patient has hepatorenal disease, will discuss this with nephrology service  7. Replacement of fluid electrolytes and nutritional evaluation physical Occupational Therapy for rehabilitation discussed  8. Gastroenterology evaluation has been requested      Plan of care was reviewed with the patient in detail.    Phil Gomez MD, MD    I disinfected my hands before and after this patient encounter.      Please note: This note was dictated using speech recognition software. Accuracy and grammar in transcription may be subject to error.

## 2022-07-06 RX ORDER — MOMETASONE FUROATE 1 MG/ML
SOLUTION TOPICAL DAILY
Qty: 60 ML | Refills: 1 | Status: ON HOLD | OUTPATIENT
Start: 2022-07-06 | End: 2022-10-16

## 2022-07-12 ENCOUNTER — OFFICE VISIT (OUTPATIENT)
Dept: FAMILY MEDICINE | Facility: CLINIC | Age: 77
End: 2022-07-12
Payer: MEDICARE

## 2022-07-12 VITALS
HEIGHT: 60 IN | OXYGEN SATURATION: 95 % | HEART RATE: 88 BPM | SYSTOLIC BLOOD PRESSURE: 124 MMHG | DIASTOLIC BLOOD PRESSURE: 78 MMHG | TEMPERATURE: 97.7 F | WEIGHT: 189 LBS | BODY MASS INDEX: 37.11 KG/M2

## 2022-07-12 DIAGNOSIS — I10 BENIGN ESSENTIAL HYPERTENSION: ICD-10-CM

## 2022-07-12 DIAGNOSIS — B37.2 YEAST DERMATITIS: ICD-10-CM

## 2022-07-12 DIAGNOSIS — Z13.1 SCREENING FOR DIABETES MELLITUS: ICD-10-CM

## 2022-07-12 DIAGNOSIS — F41.9 ANXIETY: ICD-10-CM

## 2022-07-12 DIAGNOSIS — Z00.00 ENCOUNTER FOR MEDICARE ANNUAL WELLNESS EXAM: Primary | ICD-10-CM

## 2022-07-12 DIAGNOSIS — F33.41 RECURRENT MAJOR DEPRESSIVE DISORDER, IN PARTIAL REMISSION (H): ICD-10-CM

## 2022-07-12 DIAGNOSIS — Z13.220 SCREENING CHOLESTEROL LEVEL: ICD-10-CM

## 2022-07-12 DIAGNOSIS — E66.01 MORBID OBESITY (H): ICD-10-CM

## 2022-07-12 PROCEDURE — 80061 LIPID PANEL: CPT | Performed by: NURSE PRACTITIONER

## 2022-07-12 PROCEDURE — G0439 PPPS, SUBSEQ VISIT: HCPCS | Performed by: NURSE PRACTITIONER

## 2022-07-12 PROCEDURE — 36415 COLL VENOUS BLD VENIPUNCTURE: CPT | Performed by: NURSE PRACTITIONER

## 2022-07-12 PROCEDURE — 99214 OFFICE O/P EST MOD 30 MIN: CPT | Mod: 25 | Performed by: NURSE PRACTITIONER

## 2022-07-12 PROCEDURE — 80048 BASIC METABOLIC PNL TOTAL CA: CPT | Performed by: NURSE PRACTITIONER

## 2022-07-12 RX ORDER — ESCITALOPRAM OXALATE 10 MG/1
10 TABLET ORAL DAILY
Qty: 90 TABLET | Refills: 3 | Status: SHIPPED | OUTPATIENT
Start: 2022-07-12 | End: 2023-07-14

## 2022-07-12 RX ORDER — LISINOPRIL 20 MG/1
TABLET ORAL
Qty: 90 TABLET | Refills: 3 | Status: SHIPPED | OUTPATIENT
Start: 2022-07-12 | End: 2022-10-18

## 2022-07-12 RX ORDER — NYSTATIN 100000 U/G
CREAM TOPICAL 2 TIMES DAILY
Qty: 60 G | Refills: 1 | Status: ON HOLD | OUTPATIENT
Start: 2022-07-12 | End: 2022-10-16

## 2022-07-12 ASSESSMENT — ENCOUNTER SYMPTOMS
NAUSEA: 0
HEMATOCHEZIA: 0
ABDOMINAL PAIN: 0
WEAKNESS: 0
NERVOUS/ANXIOUS: 0
MYALGIAS: 0
HEADACHES: 0
FREQUENCY: 0
COUGH: 0
PARESTHESIAS: 0
HEMATURIA: 0
CONSTIPATION: 0
EYE PAIN: 0
PALPITATIONS: 0
DIZZINESS: 0
SHORTNESS OF BREATH: 0
ARTHRALGIAS: 0
DYSURIA: 0
BREAST MASS: 0
FEVER: 0
DIARRHEA: 0
SORE THROAT: 0
HEARTBURN: 0
JOINT SWELLING: 0
CHILLS: 0

## 2022-07-12 ASSESSMENT — ACTIVITIES OF DAILY LIVING (ADL): CURRENT_FUNCTION: NO ASSISTANCE NEEDED

## 2022-07-12 ASSESSMENT — PATIENT HEALTH QUESTIONNAIRE - PHQ9
10. IF YOU CHECKED OFF ANY PROBLEMS, HOW DIFFICULT HAVE THESE PROBLEMS MADE IT FOR YOU TO DO YOUR WORK, TAKE CARE OF THINGS AT HOME, OR GET ALONG WITH OTHER PEOPLE: NOT DIFFICULT AT ALL
SUM OF ALL RESPONSES TO PHQ QUESTIONS 1-9: 0
SUM OF ALL RESPONSES TO PHQ QUESTIONS 1-9: 0

## 2022-07-12 ASSESSMENT — ASTHMA QUESTIONNAIRES: ACT_TOTALSCORE: 24

## 2022-07-12 NOTE — PROGRESS NOTES
"SUBJECTIVE:   Cami Turner is a 77 year old female who presents for Preventive Visit.    Patient has been advised of split billing requirements and indicates understanding: Yes  Are you in the first 12 months of your Medicare coverage?  No    Healthy Habits:     In general, how would you rate your overall health?  Good    Frequency of exercise:  1 day/week    Duration of exercise:  Less than 15 minutes    Do you usually eat at least 4 servings of fruit and vegetables a day, include whole grains    & fiber and avoid regularly eating high fat or \"junk\" foods?  Yes    Taking medications regularly:  Yes    Medication side effects:  None    Ability to successfully perform activities of daily living:  No assistance needed    Home Safety:  No safety concerns identified    Hearing Impairment:  No hearing concerns    In the past 6 months, have you been bothered by leaking of urine?  No    In general, how would you rate your overall mental or emotional health?  Good      PHQ-2 Total Score: 0    Additional concerns today:  No    Do you feel safe in your environment? Yes    Have you ever done Advance Care Planning? (For example, a Health Directive, POLST, or a discussion with a medical provider or your loved ones about your wishes): Yes, patient states has an Advance Care Planning document and will bring a copy to the clinic.    Fall risk  Fallen 2 or more times in the past year?: No  Any fall with injury in the past year?: No    Cognitive Screening   1) Repeat 3 items (Leader, Season, Table)    2) Clock draw: NORMAL  3) 3 item recall: Recalls 3 objects  Results: 3 items recalled: COGNITIVE IMPAIRMENT LESS LIKELY    Mini-CogTM Copyright DOMINICK Correia. Licensed by the author for use in [facility]; reprinted with permission (hope@.[facility]). All rights reserved.      Do you have sleep apnea, excessive snoring or daytime drowsiness?: no    Reviewed and updated as needed this visit by clinical staff   Tobacco  Allergies  " Meds  Problems  Med Hx  Surg Hx  Fam Hx  Soc   Hx          Reviewed and updated as needed this visit by Provider    Allergies  Meds  Problems              Social History     Tobacco Use     Smoking status: Never Smoker     Smokeless tobacco: Never Used   Substance Use Topics     Alcohol use: Yes     Comment: 3 drinks per week     If you drink alcohol do you typically have >3 drinks per day or >7 drinks per week? No    Alcohol Use 7/12/2022   Prescreen: >3 drinks/day or >7 drinks/week? No   Prescreen: >3 drinks/day or >7 drinks/week? -   No flowsheet data found.        Hypertension Follow-up      Do you check your blood pressure regularly outside of the clinic? No     Are you following a low salt diet? Yes    Are your blood pressures ever more than 140 on the top number (systolic) OR more   than 90 on the bottom number (diastolic), for example 140/90? n/a    BP Readings from Last 2 Encounters:   07/12/22 124/78   07/01/22 130/70     LDL Cholesterol Calculated (mg/dL)   Date Value   07/06/2020 159 (H)   07/02/2018 158 (A)     Asthma Follow-Up    Was ACT completed today?    Yes    ACT Total Scores 7/12/2022   ACT TOTAL SCORE (Goal Greater than or Equal to 20) 24   In the past 12 months, how many times did you visit the emergency room for your asthma without being admitted to the hospital? 0   In the past 12 months, how many times were you hospitalized overnight because of your asthma? 0          How many days per week do you miss taking your asthma controller medication?  I do not have an asthma controller medication    Please describe any recent triggers for your asthma: Dry air when traveling    Have you had any Emergency Room Visits, Urgent Care Visits, or Hospital Admissions since your last office visit?  No      Current providers sharing in care for this patient include:   Patient Care Team:  Tisha Trevino CNP as PCP - General (Nurse Practitioner - Family)  Tisha Trevino CNP as Assigned  PCP    The following health maintenance items are reviewed in Epic and correct as of today:  Health Maintenance Due   Topic Date Due     ASTHMA ACTION PLAN  Never done     DEPRESSION ACTION PLAN  Never done     DTAP/TDAP/TD IMMUNIZATION (1 - Tdap) Never done     ZOSTER IMMUNIZATION (1 of 2) Never done     COVID-19 Vaccine (4 - Booster for Pfizer series) 01/27/2022     Lab work is in process  Mammogram Screening: Mammogram Screening - Mammography discussed and declined      Pertinent mammograms are reviewed under the imaging tab.    Review of Systems   Constitutional: Negative for chills and fever.   HENT: Negative for congestion, ear pain, hearing loss and sore throat.    Eyes: Negative for pain and visual disturbance.   Respiratory: Negative for cough and shortness of breath.    Cardiovascular: Negative for chest pain, palpitations and peripheral edema.   Gastrointestinal: Negative for abdominal pain, constipation, diarrhea, heartburn, hematochezia and nausea.   Breasts:  Negative for tenderness, breast mass and discharge.   Genitourinary: Negative for dysuria, frequency, genital sores, hematuria, pelvic pain, urgency, vaginal bleeding and vaginal discharge.   Musculoskeletal: Negative for arthralgias, joint swelling and myalgias.   Skin: Negative for rash.   Neurological: Negative for dizziness, weakness, headaches and paresthesias.   Psychiatric/Behavioral: Negative for mood changes. The patient is not nervous/anxious.          OBJECTIVE:   /78 (BP Location: Right arm, Patient Position: Sitting, Cuff Size: Adult Regular)   Pulse 88   Temp 97.7  F (36.5  C) (Tympanic)   Ht 1.524 m (5')   Wt 85.7 kg (189 lb)   LMP  (LMP Unknown)   SpO2 95%   Breastfeeding No   BMI 36.91 kg/m   Estimated body mass index is 36.91 kg/m  as calculated from the following:    Height as of this encounter: 1.524 m (5').    Weight as of this encounter: 85.7 kg (189 lb).  Physical Exam  GENERAL: healthy, alert and no  distress  EYES: Eyes grossly normal to inspection, PERRL and conjunctivae and sclerae normal  HENT: ear canals and TM's normal, nose and mouth without ulcers or lesions  NECK: no adenopathy, no asymmetry, masses, or scars and thyroid normal to palpation  RESP: lungs clear to auscultation - no rales, rhonchi or wheezes  CV: regular rate and rhythm, normal S1 S2, no S3 or S4, no murmur, click or rub, no peripheral edema and peripheral pulses strong  ABDOMEN: soft, nontender, no hepatosplenomegaly, no masses and bowel sounds normal  MS: no gross musculoskeletal defects noted, no edema  SKIN: yeast derrmatitis under breasts and groin area both sides  NEURO: Normal strength and tone, mentation intact and speech normal  PSYCH: mentation appears normal, affect normal/bright    Diagnostic Test Results:  Labs reviewed in Epic    ASSESSMENT / PLAN:   Cami was seen today for physical.    Diagnoses and all orders for this visit:    Encounter for Medicare annual wellness exam    Screening cholesterol level  -     Lipid panel reflex to direct LDL Fasting; Future  -     Lipid panel reflex to direct LDL Fasting    Screening for diabetes mellitus  -     Basic metabolic panel  (Ca, Cl, CO2, Creat, Gluc, K, Na, BUN); Future  -     Basic metabolic panel  (Ca, Cl, CO2, Creat, Gluc, K, Na, BUN)    Yeast dermatitis  -     nystatin (MYCOSTATIN) 042536 UNIT/GM external cream; Apply topically 2 times daily    Recurrent major depressive disorder, in partial remission (H)    Morbid obesity (H)  Continue working on diet and exercise. Hypertension would likely improve with weight loss.    Benign essential hypertension  -     lisinopril (ZESTRIL) 20 MG tablet; TAKE 1 TABLET EVERY DAY    Anxiety  Using lexapro and occasional lorazepam prn for anxiety. No side effects, mood doing well. Ideally wean down on ativan if able. Primary caregiver for  with alzheimer's.  -     escitalopram (LEXAPRO) 10 MG tablet; Take 1 tablet (10 mg) by mouth  daily    Other orders  -     REVIEW OF HEALTH MAINTENANCE PROTOCOL ORDERS        Patient has been advised of split billing requirements and indicates understanding: Yes    COUNSELING:  Reviewed preventive health counseling, as reflected in patient instructions    Estimated body mass index is 36.91 kg/m  as calculated from the following:    Height as of this encounter: 1.524 m (5').    Weight as of this encounter: 85.7 kg (189 lb).    Weight management plan: Discussed healthy diet and exercise guidelines    She reports that she has never smoked. She has never used smokeless tobacco.      Appropriate preventive services were discussed with this patient, including applicable screening as appropriate for cardiovascular disease, diabetes, osteopenia/osteoporosis, and glaucoma.  As appropriate for age/gender, discussed screening for colorectal cancer, prostate cancer, breast cancer, and cervical cancer. Checklist reviewing preventive services available has been given to the patient.    Reviewed patients plan of care and provided an AVS. The Basic Care Plan (routine screening as documented in Health Maintenance) for Cami meets the Care Plan requirement. This Care Plan has been established and reviewed with the Patient.    Counseling Resources:  ATP IV Guidelines  Pooled Cohorts Equation Calculator  Breast Cancer Risk Calculator  Breast Cancer: Medication to Reduce Risk  FRAX Risk Assessment  ICSI Preventive Guidelines  Dietary Guidelines for Americans, 2010  [employer]'s MyPlate  ASA Prophylaxis  Lung CA Screening    Tisha Trevino CNP  [facility]    Identified Health Risks:  Answers for HPI/ROS submitted by the patient on 7/12/2022  If you checked off any problems, how difficult have these problems made it for you to do your work, take care of things at home, or get along with other people?: Not difficult at all  PHQ9 TOTAL SCORE: 0        She is at risk for lack of exercise and has been provided with  information to increase physical activity for the benefit of her well-being.

## 2022-07-12 NOTE — PATIENT INSTRUCTIONS
Patient Education   Personalized Prevention Plan  You are due for the preventive services outlined below.  Your care team is available to assist you in scheduling these services.  If you have already completed any of these items, please share that information with your care team to update in your medical record.  Health Maintenance Due   Topic Date Due     Asthma Action Plan - yearly  Never done     Depression Action Plan  Never done     Diptheria Tetanus Pertussis (DTAP/TDAP/TD) Vaccine (1 - Tdap) Never done     Zoster (Shingles) Vaccine (1 of 2) Never done     COVID-19 Vaccine (4 - Booster for Pfizer series) 01/27/2022       Exercise for a Healthier Heart  You may wonder how you can improve the health of your heart. If you re thinking about exercise, you re on the right track. You don t need to become an athlete. But you do need a certain amount of brisk exercise to help strengthen your heart. If you have been diagnosed with a heart condition, your healthcare provider may advise exercise to help stabilize your condition. To help make exercise a habit, choose safe, fun activities.      Exercise with a friend. When activity is fun, you're more likely to stick with it.   Before you start  Check with your healthcare provider before starting an exercise program. This is especially important if you have not been active for a while. It's also important if you have a long-term (chronic) health problem such as heart disease, diabetes, or obesity. Or if you are at high risk for having these problems.   Why exercise?  Exercising regularly offers many healthy rewards. It can help you do all of the following:     Improve your blood cholesterol level to help prevent further heart trouble    Lower your blood pressure to help prevent a stroke or heart attack    Control diabetes, or reduce your risk of getting this disease    Improve your heart and lung function    Reach and stay at a healthy weight    Make your muscles stronger  so you can stay active    Prevent falls and fractures by slowing the loss of bone mass (osteoporosis)    Manage stress better    Reduce your blood pressure    Improve your sense of self and your body image  Exercise tips      Ease into your routine. Set small goals. Then build on them. If you are not sure what your activity level should be, talk with your healthcare provider first before starting an exercise routine.    Exercise on most days. Aim for a total of 150 minutes (2 hours and 30 minutes) or more of moderate-intensity aerobic activity each week. Or 75 minutes (1 hour and 15 minutes) or more of vigorous-intensity aerobic activity each week. Or try for a combination of both. Moderate activity means that you breathe heavier and your heart rate increases but you can still talk. Think about doing 40 minutes of moderate exercise, 3 to 4 times a week. For best results, activity should last for about 40 minutes to lower blood pressure and cholesterol. It's OK to work up to the 40-minute period over time. Examples of moderate-intensity activity are walking 1 mile in 15 minutes. Or doing 30 to 45 minutes of yard work.    Step up your daily activity level.  Along with your exercise program, try being more active the whole day. Walk instead of drive. Or park further away so that you take more steps each day. Do more household tasks or yard work. You may not be able to meet the advised mount of physical activity. But doing some moderate- or vigorous-intensity aerobic activity can help reduce your risk for heart disease. Your healthcare provider can help you figure out what is best for you.    Choose 1 or more activities you enjoy.  Walking is one of the easiest things you can do. You can also try swimming, riding a bike, dancing, or taking an exercise class.    When to call your healthcare provider  Call your healthcare provider if you have any of these:     Chest pain or feel dizzy or lightheaded    Burning, tightness,  pressure, or heaviness in your chest, neck, shoulders, back, or arms    Abnormal shortness of breath    More joint or muscle pain    A very fast or irregular heartbeat (palpitations)  [place] last reviewed this educational content on 7/1/2019 2000-2021 [employer]. All rights reserved. This information is not intended as a substitute for professional medical care. Always follow your healthcare professional's instructions.

## 2022-07-12 NOTE — LETTER
July 19, 2022      Cami Turner  [address]  [place] [address]        Dear ,    We are writing to inform you of your test results.    Your test results fall within the expected range(s) or remain unchanged from previous results.  Please continue with current treatment plan.       Cholesterol stable with previous values.   Electrolytes and kidney function are normal.          Resulted Orders   Lipid panel reflex to direct LDL Fasting   Result Value Ref Range    Cholesterol 245 (H) <200 mg/dL    Triglycerides 139 <150 mg/dL    Direct Measure HDL 60 >=50 mg/dL    LDL Cholesterol Calculated 157 (H) <=100 mg/dL    Non HDL Cholesterol 185 (H) <130 mg/dL    Patient Fasting > 8hrs? Yes     Narrative    Cholesterol  Desirable:  <200 mg/dL    Triglycerides  Normal:  Less than 150 mg/dL  Borderline High:  150-199 mg/dL  High:  200-499 mg/dL  Very High:  Greater than or equal to 500 mg/dL    Direct Measure HDL  Female:  Greater than or equal to 50 mg/dL   Male:  Greater than or equal to 40 mg/dL    LDL Cholesterol  Desirable:  <100mg/dL  Above Desirable:  100-129 mg/dL   Borderline High:  130-159 mg/dL   High:  160-189 mg/dL   Very High:  >= 190 mg/dL    Non HDL Cholesterol  Desirable:  130 mg/dL  Above Desirable:  130-159 mg/dL  Borderline High:  160-189 mg/dL  High:  190-219 mg/dL  Very High:  Greater than or equal to 220 mg/dL   Basic metabolic panel  (Ca, Cl, CO2, Creat, Gluc, K, Na, BUN)   Result Value Ref Range    Sodium 140 133 - 144 mmol/L      Comment:      This is a corrected result. Previous result was 139 mmol/L on 7/12/2022 at  5:53 PM CDT    Potassium 5.1 3.4 - 5.3 mmol/L      Comment:      This is a corrected result. Previous result was 4.6 mmol/L on 7/13/2022 at  9:49 PM CDT    Chloride 108 94 - 109 mmol/L      Comment:      This is a corrected result. Previous result was 107 mmol/L on 7/13/2022 at  9:49 PM CDT    Carbon Dioxide (CO2) 28 20 - 32 mmol/L    Anion Gap 4 3 - 14 mmol/L     Urea Nitrogen 12 7 - 30 mg/dL    Creatinine 0.83 0.52 - 1.04 mg/dL    Calcium 9.3 8.5 - 10.1 mg/dL    Glucose 75 70 - 99 mg/dL    GFR Estimate 71 >60 mL/min/1.73m2      Comment:      Effective December 21, 2021 eGFRcr in adults is calculated using the 2021 CKD-EPI creatinine equation which includes age and gender (Berkley et al., NEJM, DOI: 10.1056/GZQKbf7958026)       Here are some other health topics that need to be addressed:  Health Maintenance Due   Topic Date Due     ASTHMA ACTION PLAN  Never done     DEPRESSION ACTION PLAN  Never done     DTAP/TDAP/TD IMMUNIZATION (1 - Tdap) Never done     ZOSTER IMMUNIZATION (1 of 2) Never done     COVID-19 Vaccine (4 - Booster for Pfizer series) 01/27/2022             If you have any questions or concerns, please call the clinic at the number listed above.       Sincerely,      Tisha Trevino, CNP

## 2022-07-13 LAB
ANION GAP SERPL CALCULATED.3IONS-SCNC: 4 MMOL/L (ref 3–14)
BUN SERPL-MCNC: 12 MG/DL (ref 7–30)
CALCIUM SERPL-MCNC: 9.3 MG/DL (ref 8.5–10.1)
CHLORIDE BLD-SCNC: 108 MMOL/L (ref 94–109)
CHOLEST SERPL-MCNC: 245 MG/DL
CO2 SERPL-SCNC: 28 MMOL/L (ref 20–32)
CREAT SERPL-MCNC: 0.83 MG/DL (ref 0.52–1.04)
FASTING STATUS PATIENT QL REPORTED: YES
GFR SERPL CREATININE-BSD FRML MDRD: 71 ML/MIN/1.73M2
GLUCOSE BLD-MCNC: 75 MG/DL (ref 70–99)
HDLC SERPL-MCNC: 60 MG/DL
LDLC SERPL CALC-MCNC: 157 MG/DL
NONHDLC SERPL-MCNC: 185 MG/DL
POTASSIUM BLD-SCNC: 5.1 MMOL/L (ref 3.4–5.3)
SODIUM SERPL-SCNC: 140 MMOL/L (ref 133–144)
TRIGL SERPL-MCNC: 139 MG/DL

## 2022-08-08 ENCOUNTER — TELEPHONE (OUTPATIENT)
Dept: FAMILY MEDICINE | Facility: CLINIC | Age: 77
End: 2022-08-08

## 2022-08-08 DIAGNOSIS — B37.2 YEAST DERMATITIS: Primary | ICD-10-CM

## 2022-08-08 NOTE — TELEPHONE ENCOUNTER
Order/Referral Request    Who is requesting: Patient     Orders being requested: Dermatology     Reason service is needed/diagnosis: Patient states that when she saw Tisha Trevino for her annual on 7/12/22 they discussed her skin issue that she's been having, but it's not getting any better.     When are orders needed by: N/A     Has this been discussed with Provider: Yes    Does patient have a preference on a Group/Provider/Facility? [facility]     Does patient have an appointment scheduled?: No    Where to send orders: N/A    Okay to leave a detailed message?: Yes at Home number on file [phone] (home)

## 2022-08-11 RX ORDER — FLUCONAZOLE 150 MG/1
150 TABLET ORAL
Qty: 3 TABLET | Refills: 0 | Status: SHIPPED | OUTPATIENT
Start: 2022-08-11 | End: 2022-08-18

## 2022-08-11 NOTE — TELEPHONE ENCOUNTER
We can try oral treatment, if not improving with that would recommend dermatology.    I sent this to pharmacy.    Tisha Trevino, CNP

## 2022-08-11 NOTE — TELEPHONE ENCOUNTER
Called  Patient and let her know about the oral medication.  Let her know to call us if it is not getting better and then we can place a dermatology referral       Vianca Paul

## 2022-08-19 ENCOUNTER — OFFICE VISIT (OUTPATIENT)
Dept: DERMATOLOGY | Facility: CLINIC | Age: 77
End: 2022-08-19
Payer: MEDICARE

## 2022-08-19 ENCOUNTER — TELEPHONE (OUTPATIENT)
Dept: FAMILY MEDICINE | Facility: CLINIC | Age: 77
End: 2022-08-19

## 2022-08-19 DIAGNOSIS — L40.9 PSORIASIS: ICD-10-CM

## 2022-08-19 DIAGNOSIS — L40.8 INVERSE PSORIASIS: Primary | ICD-10-CM

## 2022-08-19 PROCEDURE — 99204 OFFICE O/P NEW MOD 45 MIN: CPT | Performed by: PHYSICIAN ASSISTANT

## 2022-08-19 RX ORDER — HYDROCORTISONE 2.5 %
CREAM (GRAM) TOPICAL
Qty: 15 G | Refills: 3 | Status: ON HOLD | OUTPATIENT
Start: 2022-08-19 | End: 2022-10-16

## 2022-08-19 RX ORDER — TRIAMCINOLONE ACETONIDE 1 MG/G
CREAM TOPICAL
Qty: 80 G | Refills: 2 | Status: SHIPPED | OUTPATIENT
Start: 2022-08-19 | End: 2023-07-14

## 2022-08-19 RX ORDER — CLOBETASOL PROPIONATE 0.5 MG/ML
SOLUTION TOPICAL
Qty: 50 ML | Refills: 3 | Status: SHIPPED | OUTPATIENT
Start: 2022-08-19 | End: 2023-07-14

## 2022-08-19 ASSESSMENT — PAIN SCALES - GENERAL: PAINLEVEL: NO PAIN (0)

## 2022-08-19 NOTE — PATIENT INSTRUCTIONS
This looks like inverse psoriasis     For the scalp, start using clobetasol solution twice per day for 1-2 weeks at a time when needed     For the body/fold areas, start triamcinolone cream twice per day for 1-2 weeks at a time when needed    For the eyelids, start hydrocortisone 2.5% cream twice per day for 7-10 days only when needed

## 2022-08-19 NOTE — LETTER
8/19/2022         RE: Cami Turner  [address]  [place] [address]        Dear Colleague,    Thank you for referring your patient, Cami Turner, to the [facility]. Please see a copy of my visit note below.    HPI:   Chief complaints: Cami Turner is a pleasant 77 year old female who presents for evaluation of a rash which has been present for 2 months. She has red, irritated skin in both armpits, scalp, beneath breasts, umbilicus, chest and the groin. She has smaller red patches on the legs as well. She has been using nystatin but this is not helping. She was also on fluconazole which did not help. She is using mometasone solution ont he scalp which she applies and then washes off after 5 minutes.       PHYSICAL EXAM:    LMP  (LMP Unknown)   Breastfeeding No   Skin exam performed as follows: Type 2 skin. Mood appropriate  Alert and Oriented X 3. Well developed, well nourished in no distress.  General appearance: Normal  Head including face: Normal  Eyes: conjunctiva and lids: Normal  Mouth: Lips, teeth, gums: Normal  Neck: Normal  Cardiovascular: Exam of peripheral vascular system by observation for swelling, varicosities, edema: Normal  Right upper extremity: Normal  Left upper extremity: Normal  Right lower extremity: Normal  Left lower extremity: Normal  Skin: Scalp and body hair: See below    Pink plaques on bilateral axillae, inframammary folds, umbilicus, groin; few psoriasiform papules on the abdomen and legs; psoriasiform dermatitis on the scalp and ears    ASSESSMENT/PLAN:     1. Psoriasis, inverse psoriasis - discussed diagnosis and treatment options. She has been under considerable stress lately.   --Start TAC cream BID to folds, legs x 1-2 weeks then PRN  --Lidex to scalp BID x 1-2 weeks then PRN  --HC 2.5% cream to eyelids BID x 5-7 days then stop          Follow-up: 1 month  CC:   Scribed By: Ree Arzola, MS, PA-C          Again, thank you for  allowing me to participate in the care of your patient.        Sincerely,        Ree Ceballos PA-C

## 2022-08-19 NOTE — TELEPHONE ENCOUNTER
Called pt daughter and scheduled at 4 pm at  with Ree betancourt.    Erika EASON RN  Dermatology   [phone]

## 2022-08-19 NOTE — TELEPHONE ENCOUNTER
[facility] Call Center    Phone Message    May a detailed message be left on voicemail: yes     Reason for Call: Symptoms or Concerns     If patient has red-flag symptoms, warm transfer to triage line    Current symptom or concern: Severe rash over most of body, looks like a yeast infection in some places.     Symptoms have been present for:  2+ months    Has patient previously been seen for this? Yes    By : Trina Milner APRN CNP    Date: 7/1/22    Are there any new or worsening symptoms? Yes: Pt's daughter Annita states there has been no relief when using medication given by PCP.       Action Taken: Other: [facility] Skin    Travel Screening: Not Applicable

## 2022-08-19 NOTE — PROGRESS NOTES
HPI:   Chief complaints: Cami Turner is a pleasant 77 year old female who presents for evaluation of a rash which has been present for 2 months. She has red, irritated skin in both armpits, scalp, beneath breasts, umbilicus, chest and the groin. She has smaller red patches on the legs as well. She has been using nystatin but this is not helping. She was also on fluconazole which did not help. She is using mometasone solution ont he scalp which she applies and then washes off after 5 minutes.       PHYSICAL EXAM:    LMP  (LMP Unknown)   Breastfeeding No   Skin exam performed as follows: Type 2 skin. Mood appropriate  Alert and Oriented X 3. Well developed, well nourished in no distress.  General appearance: Normal  Head including face: Normal  Eyes: conjunctiva and lids: Normal  Mouth: Lips, teeth, gums: Normal  Neck: Normal  Cardiovascular: Exam of peripheral vascular system by observation for swelling, varicosities, edema: Normal  Right upper extremity: Normal  Left upper extremity: Normal  Right lower extremity: Normal  Left lower extremity: Normal  Skin: Scalp and body hair: See below    Pink plaques on bilateral axillae, inframammary folds, umbilicus, groin; few psoriasiform papules on the abdomen and legs; psoriasiform dermatitis on the scalp and ears    ASSESSMENT/PLAN:     1. Psoriasis, inverse psoriasis - discussed diagnosis and treatment options. She has been under considerable stress lately.   --Start TAC cream BID to folds, legs x 1-2 weeks then PRN  --Lidex to scalp BID x 1-2 weeks then PRN  --HC 2.5% cream to eyelids BID x 5-7 days then stop          Follow-up: 1 month  CC:   Scribed By: Ree Arzola, MS, PA-C

## 2022-09-13 ENCOUNTER — OFFICE VISIT (OUTPATIENT)
Dept: DERMATOLOGY | Facility: CLINIC | Age: 77
End: 2022-09-13
Payer: MEDICARE

## 2022-09-13 DIAGNOSIS — L40.8 INVERSE PSORIASIS: ICD-10-CM

## 2022-09-13 DIAGNOSIS — L40.9 PSORIASIS: Primary | ICD-10-CM

## 2022-09-13 PROCEDURE — 99213 OFFICE O/P EST LOW 20 MIN: CPT | Performed by: PHYSICIAN ASSISTANT

## 2022-09-13 NOTE — LETTER
9/13/2022         RE: Cami Turner  [address]  [place] [address]        Dear Colleague,    Thank you for referring your patient, Cami Turner, to the [facility]. Please see a copy of my visit note below.    HPI:   Chief complaints: Cami Turner is a pleasant 77 year old female who presents for recheck inverse psoriasis and scalp psoriasis. She reports considerable improvement with topicals. She still does have some activity in her right armpit and on her scalp but neither are bothersome. She will use the scalp solution about once per week.        PHYSICAL EXAM:    LMP  (LMP Unknown)   Skin exam performed as follows: Type 2 skin. Mood appropriate  Alert and Oriented X 3. Well developed, well nourished in no distress.  General appearance: Normal  Head including face: Normal  Eyes: conjunctiva and lids: Normal  Mouth: Lips, teeth, gums: Normal  Neck: Normal  Cardiovascular: Exam of peripheral vascular system by observation for swelling, varicosities, edema: Normal  Right upper extremity: Normal  Left upper extremity: Normal  Right lower extremity: Normal  Left lower extremity: Normal  Skin: Scalp and body hair: See below    Faint small pink plaques on the right axilla, beneath breasts; psoriasiform dermatitis on the scalp    ASSESSMENT/PLAN:     1. Psoriasis, inverse psoriasis - improved! Advised that some areas are not resolved and if bothersome she can continue to treat these with topicals. She still is under considerable stress; advised this will often make psoriasis flare.   --Continue TAC cream to folds, legs BID x 1-2 weeks PRN flares  --Lidex to scalp BID x 1-2 weeks then PRN  --HC 2.5% cream to eyelids BID x 5-7 days then stop          Follow-up: 4-6 months if needed/yearly if doing well  CC:   Scribed By: Ree Arzola, MS, PA-C          Again, thank you for allowing me to participate in the care of your patient.        Sincerely,        Ree RAI  TEZ Ceballos

## 2022-09-13 NOTE — PROGRESS NOTES
HPI:   Chief complaints: Cami Turner is a pleasant 77 year old female who presents for recheck inverse psoriasis and scalp psoriasis. She reports considerable improvement with topicals. She still does have some activity in her right armpit and on her scalp but neither are bothersome. She will use the scalp solution about once per week.        PHYSICAL EXAM:    LMP  (LMP Unknown)   Skin exam performed as follows: Type 2 skin. Mood appropriate  Alert and Oriented X 3. Well developed, well nourished in no distress.  General appearance: Normal  Head including face: Normal  Eyes: conjunctiva and lids: Normal  Mouth: Lips, teeth, gums: Normal  Neck: Normal  Cardiovascular: Exam of peripheral vascular system by observation for swelling, varicosities, edema: Normal  Right upper extremity: Normal  Left upper extremity: Normal  Right lower extremity: Normal  Left lower extremity: Normal  Skin: Scalp and body hair: See below    Faint small pink plaques on the right axilla, beneath breasts; psoriasiform dermatitis on the scalp    ASSESSMENT/PLAN:     1. Psoriasis, inverse psoriasis - improved! Advised that some areas are not resolved and if bothersome she can continue to treat these with topicals. She still is under considerable stress; advised this will often make psoriasis flare.   --Continue TAC cream to folds, legs BID x 1-2 weeks PRN flares  --Lidex to scalp BID x 1-2 weeks then PRN  --HC 2.5% cream to eyelids BID x 5-7 days then stop          Follow-up: 4-6 months if needed/yearly if doing well  CC:   Scribed By: Ree Arzola, MS, PA-C

## 2022-10-13 ENCOUNTER — APPOINTMENT (OUTPATIENT)
Dept: MRI IMAGING | Facility: CLINIC | Age: 77
DRG: 872 | End: 2022-10-13
Attending: EMERGENCY MEDICINE
Payer: MEDICARE

## 2022-10-13 ENCOUNTER — HOSPITAL ENCOUNTER (INPATIENT)
Facility: CLINIC | Age: 77
LOS: 2 days | Discharge: NURSING, REHAB OR LONG-TERM CARE | DRG: 872 | End: 2022-10-17
Attending: EMERGENCY MEDICINE | Admitting: INTERNAL MEDICINE
Payer: MEDICARE

## 2022-10-13 DIAGNOSIS — F41.9 ANXIETY: ICD-10-CM

## 2022-10-13 DIAGNOSIS — B37.2 YEAST DERMATITIS: ICD-10-CM

## 2022-10-13 DIAGNOSIS — M62.81 GENERALIZED MUSCLE WEAKNESS: ICD-10-CM

## 2022-10-13 DIAGNOSIS — J45.998 SEASONAL ASTHMA: ICD-10-CM

## 2022-10-13 DIAGNOSIS — A41.9 SEPSIS, DUE TO UNSPECIFIED ORGANISM, UNSPECIFIED WHETHER ACUTE ORGAN DYSFUNCTION PRESENT (H): ICD-10-CM

## 2022-10-13 DIAGNOSIS — L21.9 SEBORRHEIC DERMATITIS: ICD-10-CM

## 2022-10-13 DIAGNOSIS — L40.8 INVERSE PSORIASIS: ICD-10-CM

## 2022-10-13 DIAGNOSIS — R29.6 MULTIPLE FALLS: ICD-10-CM

## 2022-10-13 DIAGNOSIS — I66.9 ASYMPTOMATIC STENOSIS OF INTRACRANIAL ARTERY: Primary | ICD-10-CM

## 2022-10-13 LAB
ANION GAP SERPL CALCULATED.3IONS-SCNC: 10 MMOL/L (ref 7–15)
APTT PPP: 29 SECONDS (ref 22–38)
BASOPHILS # BLD AUTO: 0 10E3/UL (ref 0–0.2)
BASOPHILS NFR BLD AUTO: 0 %
BUN SERPL-MCNC: 13 MG/DL (ref 8–23)
CALCIUM SERPL-MCNC: 9.2 MG/DL (ref 8.8–10.2)
CHLORIDE SERPL-SCNC: 97 MMOL/L (ref 98–107)
CREAT SERPL-MCNC: 0.87 MG/DL (ref 0.51–0.95)
DEPRECATED HCO3 PLAS-SCNC: 27 MMOL/L (ref 22–29)
EOSINOPHIL # BLD AUTO: 0 10E3/UL (ref 0–0.7)
EOSINOPHIL NFR BLD AUTO: 0 %
ERYTHROCYTE [DISTWIDTH] IN BLOOD BY AUTOMATED COUNT: 14.3 % (ref 10–15)
GFR SERPL CREATININE-BSD FRML MDRD: 67 ML/MIN/1.73M2
GLUCOSE SERPL-MCNC: 107 MG/DL (ref 70–99)
HCT VFR BLD AUTO: 39.5 % (ref 35–47)
HGB BLD-MCNC: 12.5 G/DL (ref 11.7–15.7)
IMM GRANULOCYTES # BLD: 0.1 10E3/UL
IMM GRANULOCYTES NFR BLD: 0 %
INR PPP: 1.1 (ref 0.85–1.15)
LYMPHOCYTES # BLD AUTO: 1 10E3/UL (ref 0.8–5.3)
LYMPHOCYTES NFR BLD AUTO: 8 %
MAGNESIUM SERPL-MCNC: 1.9 MG/DL (ref 1.7–2.3)
MCH RBC QN AUTO: 29.4 PG (ref 26.5–33)
MCHC RBC AUTO-ENTMCNC: 31.6 G/DL (ref 31.5–36.5)
MCV RBC AUTO: 93 FL (ref 78–100)
MONOCYTES # BLD AUTO: 0.5 10E3/UL (ref 0–1.3)
MONOCYTES NFR BLD AUTO: 4 %
NEUTROPHILS # BLD AUTO: 12 10E3/UL (ref 1.6–8.3)
NEUTROPHILS NFR BLD AUTO: 88 %
NRBC # BLD AUTO: 0 10E3/UL
NRBC BLD AUTO-RTO: 0 /100
PHOSPHATE SERPL-MCNC: 2.8 MG/DL (ref 2.5–4.5)
PLATELET # BLD AUTO: 230 10E3/UL (ref 150–450)
POTASSIUM SERPL-SCNC: 4.1 MMOL/L (ref 3.4–5.3)
RBC # BLD AUTO: 4.25 10E6/UL (ref 3.8–5.2)
SODIUM SERPL-SCNC: 134 MMOL/L (ref 136–145)
TROPONIN T SERPL HS-MCNC: 14 NG/L
WBC # BLD AUTO: 13.6 10E3/UL (ref 4–11)

## 2022-10-13 PROCEDURE — 83735 ASSAY OF MAGNESIUM: CPT | Performed by: EMERGENCY MEDICINE

## 2022-10-13 PROCEDURE — 70544 MR ANGIOGRAPHY HEAD W/O DYE: CPT | Mod: MA

## 2022-10-13 PROCEDURE — 99220 PR INITIAL OBSERVATION CARE,LEVEL III: CPT | Performed by: INTERNAL MEDICINE

## 2022-10-13 PROCEDURE — 99285 EMERGENCY DEPT VISIT HI MDM: CPT | Mod: 25

## 2022-10-13 PROCEDURE — 85730 THROMBOPLASTIN TIME PARTIAL: CPT | Performed by: EMERGENCY MEDICINE

## 2022-10-13 PROCEDURE — 85610 PROTHROMBIN TIME: CPT | Performed by: EMERGENCY MEDICINE

## 2022-10-13 PROCEDURE — 70549 MR ANGIOGRAPH NECK W/O&W/DYE: CPT | Mod: MA

## 2022-10-13 PROCEDURE — C9803 HOPD COVID-19 SPEC COLLECT: HCPCS

## 2022-10-13 PROCEDURE — 96361 HYDRATE IV INFUSION ADD-ON: CPT

## 2022-10-13 PROCEDURE — U0003 INFECTIOUS AGENT DETECTION BY NUCLEIC ACID (DNA OR RNA); SEVERE ACUTE RESPIRATORY SYNDROME CORONAVIRUS 2 (SARS-COV-2) (CORONAVIRUS DISEASE [COVID-19]), AMPLIFIED PROBE TECHNIQUE, MAKING USE OF HIGH THROUGHPUT TECHNOLOGIES AS DESCRIBED BY CMS-2020-01-R: HCPCS | Performed by: EMERGENCY MEDICINE

## 2022-10-13 PROCEDURE — 255N000002 HC RX 255 OP 636: Performed by: EMERGENCY MEDICINE

## 2022-10-13 PROCEDURE — 93005 ELECTROCARDIOGRAM TRACING: CPT

## 2022-10-13 PROCEDURE — 258N000003 HC RX IP 258 OP 636: Performed by: EMERGENCY MEDICINE

## 2022-10-13 PROCEDURE — A9585 GADOBUTROL INJECTION: HCPCS | Performed by: EMERGENCY MEDICINE

## 2022-10-13 PROCEDURE — 80048 BASIC METABOLIC PNL TOTAL CA: CPT | Performed by: EMERGENCY MEDICINE

## 2022-10-13 PROCEDURE — G0378 HOSPITAL OBSERVATION PER HR: HCPCS

## 2022-10-13 PROCEDURE — 36415 COLL VENOUS BLD VENIPUNCTURE: CPT | Performed by: EMERGENCY MEDICINE

## 2022-10-13 PROCEDURE — 85025 COMPLETE CBC W/AUTO DIFF WBC: CPT | Performed by: EMERGENCY MEDICINE

## 2022-10-13 PROCEDURE — 96360 HYDRATION IV INFUSION INIT: CPT

## 2022-10-13 PROCEDURE — 70553 MRI BRAIN STEM W/O & W/DYE: CPT | Mod: MA

## 2022-10-13 PROCEDURE — 84484 ASSAY OF TROPONIN QUANT: CPT | Performed by: EMERGENCY MEDICINE

## 2022-10-13 PROCEDURE — 84100 ASSAY OF PHOSPHORUS: CPT | Performed by: EMERGENCY MEDICINE

## 2022-10-13 RX ORDER — SODIUM CHLORIDE 9 MG/ML
INJECTION, SOLUTION INTRAVENOUS CONTINUOUS PRN
Status: DISCONTINUED | OUTPATIENT
Start: 2022-10-13 | End: 2022-10-14

## 2022-10-13 RX ORDER — GADOBUTROL 604.72 MG/ML
10 INJECTION INTRAVENOUS ONCE
Status: COMPLETED | OUTPATIENT
Start: 2022-10-13 | End: 2022-10-13

## 2022-10-13 RX ADMIN — GADOBUTROL 10 ML: 604.72 INJECTION INTRAVENOUS at 18:44

## 2022-10-13 RX ADMIN — SODIUM CHLORIDE: 9 INJECTION, SOLUTION INTRAVENOUS at 17:58

## 2022-10-13 ASSESSMENT — ENCOUNTER SYMPTOMS
WEAKNESS: 1
FEVER: 0
DYSURIA: 0
PALPITATIONS: 0
SHORTNESS OF BREATH: 0
LIGHT-HEADEDNESS: 1
NAUSEA: 0
DIAPHORESIS: 0
DIARRHEA: 0

## 2022-10-13 ASSESSMENT — ACTIVITIES OF DAILY LIVING (ADL)
ADLS_ACUITY_SCORE: 35

## 2022-10-13 NOTE — ED TRIAGE NOTES
Arrives via EMS from home where lives with . Today had two falls. One this morning at [facility], and another at home shortly before arrival by EMS.  Patient states legs have been giving out on her. Unwitnessed fall at home.  Patient denies hitting head.  Is not on blood thinners. GM=282. ABCD's intact; A/O x 4.      Triage Assessment     Row Name 10/13/22 3764       Triage Assessment (Adult)    Airway WDL WDL       Respiratory WDL    Respiratory WDL WDL       Cognitive/Neuro/Behavioral WDL    Cognitive/Neuro/Behavioral WDL WDL

## 2022-10-13 NOTE — ED PROVIDER NOTES
"  History   Chief Complaint:  falls/weakness    The history is provided by the patient.      Cami Turner is a 77 year old female with history of hypertension and obesity who presents with falls/weakness. The patient reports that she felt lightheaded like she might faint while shopping at [employer] earlier today. States she tried to find a chair and was not able to get to it fast enough and so she fell. Notes that after she got home, she felt the same sensation and fell again. Adds that both falls were unwitnessed. Of note, the patient reports onset of weakness in her legs like they might \"give out\" yesterday but notes that her lightheadedness started today. States that prior to both falls today, she left like her legs might \"give out\" and that she was lightheaded. Denies head trauma from both falls. Denies nausea, diaphoresis, diarrhea, leg swelling, fever, shortness of breath, palpitations, dysuria, frequency, and known sick exposures.     Review of Systems   Constitutional: Negative for diaphoresis and fever.   Respiratory: Negative for shortness of breath.    Cardiovascular: Negative for palpitations and leg swelling.   Gastrointestinal: Negative for diarrhea and nausea.   Genitourinary: Negative for dysuria and urgency.   Neurological: Positive for weakness and light-headedness.   All other systems reviewed and are negative.    Allergies:  No Known Allergies    Medications:  Albuterol  Escitalopram  Lisinopril  Lorazepam  Aspirin 81 MG    Past Medical History:     Hypertension  Depression  Obesity      Past Surgical History:    Ankle, left  Hip, right  Hysterectomy     Social History:  Presents with daughterAnnita  Presents via EMS    Physical Exam     Patient Vitals for the past 24 hrs:   BP Temp Temp src Pulse Resp SpO2 Height Weight   10/13/22 2321 -- -- -- -- -- 94 % -- --   10/13/22 2311 -- -- -- -- -- 94 % -- --   10/13/22 2301 -- -- -- -- -- 94 % -- --   10/13/22 2251 -- -- -- -- -- 97 % -- --   10/13/22 " "2241 -- -- -- -- -- 96 % -- --   10/13/22 2235 -- -- -- -- -- 96 % -- --   10/13/22 2225 -- -- -- -- -- 95 % -- --   10/13/22 2215 -- -- -- -- -- 94 % -- --   10/13/22 2155 -- -- -- -- -- 97 % -- --   10/13/22 2145 -- -- -- -- -- 94 % -- --   10/13/22 2135 119/66 -- -- -- -- 96 % -- --   10/13/22 1945 -- -- -- -- -- 93 % -- --   10/13/22 1944 -- -- -- -- -- 98 % -- --   10/1943 -- -- -- -- -- 97 % -- --   10/13/22 1941 -- -- -- -- -- 96 % -- --   10/13/22 1940 -- -- -- -- -- 97 % -- --   10/13/22 1939 (!) 145/90 -- -- 91 -- 96 % -- --   10/13/22 1830 136/75 -- -- 90 25 97 % -- --   10/13/22 1817 -- -- -- 90 20 96 % -- --   10/13/22 1815 (!) 172/85 -- -- 87 20 97 % -- --   10/13/22 1700 (!) 190/88 98.8  F (37.1  C) Oral 93 18 97 % 1.575 m (5' 2\") 85.7 kg (189 lb)     Physical Exam  Constitutional: Vital signs reviewed as above.   HENT:    Head: No external signs of trauma noted.   Eyes: Pupils are equal, round, and reactive to light. No nystagmus noted  Cardiovascular: Normal rate, regular rhythm. Soft systolic murmur. Intact distal pulses.    Pulmonary/Chest: Effort normal and breath sounds normal. No respiratory distress. No wheezes noted.   Gastrointestinal: Soft. There is no tenderness. There is no rebound.   Musculoskeletal:   No deformities appreciated   No edema noted  Neurological:    Patient is alert and oriented to person, place, and time.    Speech is fluent, cognition is normal.   CN 2-12 intact though she did have seeming difficulty comprehending how to close her lips and puff out her cheeks    Otherwise PERRL, EOMI, symmetric smile, equal eye squeeze and forehead raise, normal and equal sensation to bilateral forehead/cheek/chin, grossly equal hearing B/L, midline tongue protrusion with nl side-to-side movement, normal shoulder shrug.    RUE strength 4/5: , finger abd, wrist flex/ext, 3-4/5 elbow flex/ext. There appears to be a RUE tremor   LUE strength 4/5: , finger abd, wrist flex/ext, " elbow flex/ext.    RLE strength 5/5: ankle flex/ext, knee flex/ext, hip flex.    LLE strength 5/5: ankle flex/ext, knee flex/ext, hip flex.    Sensation equal in all 4 extremities.    No arm drift.     Cerebellar: Slow (but symmetric) when attempting rapid alternating movements     ( finger-nose-finger, rapid pronation/supination, hand rolling, heel-to-shin)   Skin: Skin is warm and dry.   Psychiatric: The patient appears calm    Emergency Department Course   ECG  ECG taken at 1755, ECG read at 1803  Normal sinus rhythm   nochange as compared to prior, dated 6/5/21.  Rate 91 bpm. MD interval 160 ms. QRS duration 84 ms. QT/QTc 360/442 ms. P-R-T axes 35 -10 17.     Imaging:  MR Brain w/o & w Contrast   Final Result   IMPRESSION:   1.  No acute intracranial process identified.   2.  Unchanged ventriculomegaly disproportionate to the degree of sulcal prominence and other findings that raise concern for the possibility of chronic communicating/normal pressure hydrocephalus. Recommend clinical correlation. The degree of    ventriculomegaly is unchanged from the prior CT.   3.  Brain parenchymal volume loss and presumed chronic small vessel ischemic changes, as described.   4.  Patchy nonspecific T2 hyperintense marrow signal and enhancement involving the right mandibular condyle in the region of the right temporomandibular joint. This may be reactive to degenerative arthropathy. Recommend clinical correlation.      MRA Neck (Carotids) wo & w Contrast   Final Result   IMPRESSION:   1.  No significant stenosis or occlusion of the cervical carotid or vertebral arteries.   2.  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      MRA Brain ([place]) wo Contrast   Final Result   IMPRESSION:   1. New mild to moderate focal stenosis of the distal P1 segment of the right posterior cerebral artery.   2. Otherwise, no significant proximal arterial stenosis or large  vessel occlusion identified involving the major intracranial arteries.   3. Developmentally small right vertebral artery appears to terminate in the posteroinferior cerebellar artery, a normal variant. This is unchanged from prior.        Report per radiology    Laboratory:  Labs Ordered and Resulted from Time of ED Arrival to Time of ED Departure   BASIC METABOLIC PANEL - Abnormal       Result Value    Sodium 134 (*)     Potassium 4.1      Chloride 97 (*)     Carbon Dioxide (CO2) 27      Anion Gap 10      Urea Nitrogen 13.0      Creatinine 0.87      Calcium 9.2      Glucose 107 (*)     GFR Estimate 67     CBC WITH PLATELETS AND DIFFERENTIAL - Abnormal    WBC Count 13.6 (*)     RBC Count 4.25      Hemoglobin 12.5      Hematocrit 39.5      MCV 93      MCH 29.4      MCHC 31.6      RDW 14.3      Platelet Count 230      % Neutrophils 88      % Lymphocytes 8      % Monocytes 4      % Eosinophils 0      % Basophils 0      % Immature Granulocytes 0      NRBCs per 100 WBC 0      Absolute Neutrophils 12.0 (*)     Absolute Lymphocytes 1.0      Absolute Monocytes 0.5      Absolute Eosinophils 0.0      Absolute Basophils 0.0      Absolute Immature Granulocytes 0.1      Absolute NRBCs 0.0     INR - Normal    INR 1.10     PARTIAL THROMBOPLASTIN TIME - Normal    aPTT 29     TROPONIN T, HIGH SENSITIVITY - Normal    Troponin T, High Sensitivity 14     MAGNESIUM - Normal    Magnesium 1.9     PHOSPHORUS - Normal    Phosphorus 2.8     COVID-19 VIRUS (CORONAVIRUS) BY PCR - Normal    SARS CoV2 PCR Negative     GLUCOSE MONITOR NURSING POCT   ROUTINE UA WITH MICROSCOPIC REFLEX TO CULTURE      Emergency Department Course:  Reviewed:  I reviewed nursing notes, vitals and past medical history    ED Course as of 10/14/22 0004   Thu Oct 13, 2022   1717 I obtained initial history and performed physical exam as noted above.   2153 I rechecked and updated the patient.    2240 I spoke with Dr. Candelario Knight of the hospitalist service regarding  admission.   2240 D/W Dr. Knight.     Interventions:  1758 Sodium chloride infusion, IV  1844 Gadavist, 10 mL, IV  1844 Sodium chloride flush, 100 mL, IV    Disposition:  The patient was admitted to the hospital under the care of Dr. Knight.     Impression & Plan   Medical Decision Making:     This 77-year-old female patient presents the ED due to fall/weakness.  Please see the HPI and exam for specifics.  A broad differential was considered including electrolyte dysfunction, cardiac dysrhythmia/myocardial injury, stroke (I had somewhat higher suspicion for this as the patient seemed to have difficulty comprehending and then performing the tasks to close her lips and puff out her cheeks and she seemed to have a right upper extremity tremor with some slightly increased weakness in that extremity compared to the left upper extremity though there was no drift), infection, etc.  The above work-up was undertaken.    EKG is notable for NSR.  Laboratory findings are, fortunately, reassuring.  Urinalysis is pending.  Imaging findings, fortunately, show no evidence of stroke.  The patient still was quite weak and unable to safely ambulate on her own.    I discussed the case with the accepting hospitalist and we will bring the patient into observation for further monitoring and care.    Diagnosis:    ICD-10-CM    1. Generalized muscle weakness  M62.81       2. Multiple falls  R29.6         Scribe Disclosure:  I, Cat Keller, am serving as a scribe at 5:07 PM on 10/13/2022 to document services personally performed by Mike Carey DO based on my observations and the provider's statements to me.      Mike Carey DO  10/14/22 0017

## 2022-10-14 ENCOUNTER — APPOINTMENT (OUTPATIENT)
Dept: PHYSICAL THERAPY | Facility: CLINIC | Age: 77
DRG: 872 | End: 2022-10-14
Attending: INTERNAL MEDICINE
Payer: MEDICARE

## 2022-10-14 LAB
ALBUMIN UR-MCNC: 30 MG/DL
ANION GAP SERPL CALCULATED.3IONS-SCNC: 12 MMOL/L (ref 7–15)
APPEARANCE UR: CLEAR
ATRIAL RATE - MUSE: 91 BPM
BACTERIA #/AREA URNS HPF: ABNORMAL /HPF
BILIRUB UR QL STRIP: NEGATIVE
BUN SERPL-MCNC: 14.7 MG/DL (ref 8–23)
CALCIUM SERPL-MCNC: 8.5 MG/DL (ref 8.8–10.2)
CHLORIDE SERPL-SCNC: 103 MMOL/L (ref 98–107)
CHOLEST SERPL-MCNC: 176 MG/DL
COLOR UR AUTO: YELLOW
CREAT SERPL-MCNC: 0.85 MG/DL (ref 0.51–0.95)
DEPRECATED HCO3 PLAS-SCNC: 22 MMOL/L (ref 22–29)
DIASTOLIC BLOOD PRESSURE - MUSE: NORMAL MMHG
ERYTHROCYTE [DISTWIDTH] IN BLOOD BY AUTOMATED COUNT: 14.6 % (ref 10–15)
GFR SERPL CREATININE-BSD FRML MDRD: 69 ML/MIN/1.73M2
GLUCOSE SERPL-MCNC: 92 MG/DL (ref 70–99)
GLUCOSE UR STRIP-MCNC: NEGATIVE MG/DL
HCT VFR BLD AUTO: 33.6 % (ref 35–47)
HDLC SERPL-MCNC: 59 MG/DL
HGB BLD-MCNC: 10.7 G/DL (ref 11.7–15.7)
HGB UR QL STRIP: NEGATIVE
INTERPRETATION ECG - MUSE: NORMAL
KETONES UR STRIP-MCNC: 20 MG/DL
LACTATE SERPL-SCNC: 0.9 MMOL/L (ref 0.7–2)
LDLC SERPL CALC-MCNC: 96 MG/DL
LEUKOCYTE ESTERASE UR QL STRIP: ABNORMAL
MCH RBC QN AUTO: 29.2 PG (ref 26.5–33)
MCHC RBC AUTO-ENTMCNC: 31.8 G/DL (ref 31.5–36.5)
MCV RBC AUTO: 92 FL (ref 78–100)
MUCOUS THREADS #/AREA URNS LPF: PRESENT /LPF
NITRATE UR QL: NEGATIVE
NONHDLC SERPL-MCNC: 117 MG/DL
P AXIS - MUSE: 35 DEGREES
PH UR STRIP: 6 [PH] (ref 5–7)
PLATELET # BLD AUTO: 178 10E3/UL (ref 150–450)
POTASSIUM SERPL-SCNC: 3.9 MMOL/L (ref 3.4–5.3)
PR INTERVAL - MUSE: 160 MS
QRS DURATION - MUSE: 84 MS
QT - MUSE: 360 MS
QTC - MUSE: 442 MS
R AXIS - MUSE: -10 DEGREES
RBC # BLD AUTO: 3.66 10E6/UL (ref 3.8–5.2)
RBC URINE: 7 /HPF
SARS-COV-2 RNA RESP QL NAA+PROBE: NEGATIVE
SODIUM SERPL-SCNC: 137 MMOL/L (ref 136–145)
SP GR UR STRIP: 1.03 (ref 1–1.03)
SQUAMOUS EPITHELIAL: 1 /HPF
SYSTOLIC BLOOD PRESSURE - MUSE: NORMAL MMHG
T AXIS - MUSE: 17 DEGREES
TRIGL SERPL-MCNC: 105 MG/DL
UROBILINOGEN UR STRIP-MCNC: 2 MG/DL
VENTRICULAR RATE- MUSE: 91 BPM
WBC # BLD AUTO: 9.5 10E3/UL (ref 4–11)
WBC URINE: 30 /HPF

## 2022-10-14 PROCEDURE — G0378 HOSPITAL OBSERVATION PER HR: HCPCS

## 2022-10-14 PROCEDURE — 81001 URINALYSIS AUTO W/SCOPE: CPT | Performed by: INTERNAL MEDICINE

## 2022-10-14 PROCEDURE — 250N000013 HC RX MED GY IP 250 OP 250 PS 637: Performed by: INTERNAL MEDICINE

## 2022-10-14 PROCEDURE — 97530 THERAPEUTIC ACTIVITIES: CPT | Mod: GP

## 2022-10-14 PROCEDURE — 250N000013 HC RX MED GY IP 250 OP 250 PS 637: Performed by: STUDENT IN AN ORGANIZED HEALTH CARE EDUCATION/TRAINING PROGRAM

## 2022-10-14 PROCEDURE — 97161 PT EVAL LOW COMPLEX 20 MIN: CPT | Mod: GP

## 2022-10-14 PROCEDURE — 87086 URINE CULTURE/COLONY COUNT: CPT | Performed by: INTERNAL MEDICINE

## 2022-10-14 PROCEDURE — 99225 PR SUBSEQUENT OBSERVATION CARE,LEVEL II: CPT | Performed by: STUDENT IN AN ORGANIZED HEALTH CARE EDUCATION/TRAINING PROGRAM

## 2022-10-14 PROCEDURE — 87040 BLOOD CULTURE FOR BACTERIA: CPT | Performed by: STUDENT IN AN ORGANIZED HEALTH CARE EDUCATION/TRAINING PROGRAM

## 2022-10-14 PROCEDURE — 258N000003 HC RX IP 258 OP 636: Performed by: INTERNAL MEDICINE

## 2022-10-14 PROCEDURE — 80048 BASIC METABOLIC PNL TOTAL CA: CPT | Performed by: STUDENT IN AN ORGANIZED HEALTH CARE EDUCATION/TRAINING PROGRAM

## 2022-10-14 PROCEDURE — 258N000003 HC RX IP 258 OP 636: Performed by: STUDENT IN AN ORGANIZED HEALTH CARE EDUCATION/TRAINING PROGRAM

## 2022-10-14 PROCEDURE — 83605 ASSAY OF LACTIC ACID: CPT | Performed by: STUDENT IN AN ORGANIZED HEALTH CARE EDUCATION/TRAINING PROGRAM

## 2022-10-14 PROCEDURE — 250N000011 HC RX IP 250 OP 636: Performed by: STUDENT IN AN ORGANIZED HEALTH CARE EDUCATION/TRAINING PROGRAM

## 2022-10-14 PROCEDURE — 96374 THER/PROPH/DIAG INJ IV PUSH: CPT

## 2022-10-14 PROCEDURE — 85027 COMPLETE CBC AUTOMATED: CPT | Performed by: STUDENT IN AN ORGANIZED HEALTH CARE EDUCATION/TRAINING PROGRAM

## 2022-10-14 PROCEDURE — 96361 HYDRATE IV INFUSION ADD-ON: CPT

## 2022-10-14 PROCEDURE — 36415 COLL VENOUS BLD VENIPUNCTURE: CPT | Performed by: STUDENT IN AN ORGANIZED HEALTH CARE EDUCATION/TRAINING PROGRAM

## 2022-10-14 PROCEDURE — 80061 LIPID PANEL: CPT | Performed by: STUDENT IN AN ORGANIZED HEALTH CARE EDUCATION/TRAINING PROGRAM

## 2022-10-14 RX ORDER — AMOXICILLIN 250 MG
2 CAPSULE ORAL 2 TIMES DAILY PRN
Status: DISCONTINUED | OUTPATIENT
Start: 2022-10-14 | End: 2022-10-17 | Stop reason: HOSPADM

## 2022-10-14 RX ORDER — ACETAMINOPHEN 325 MG/1
650 TABLET ORAL EVERY 6 HOURS PRN
Status: DISCONTINUED | OUTPATIENT
Start: 2022-10-14 | End: 2022-10-17 | Stop reason: HOSPADM

## 2022-10-14 RX ORDER — ACETAMINOPHEN 650 MG/1
650 SUPPOSITORY RECTAL EVERY 6 HOURS PRN
Status: DISCONTINUED | OUTPATIENT
Start: 2022-10-14 | End: 2022-10-17 | Stop reason: HOSPADM

## 2022-10-14 RX ORDER — LISINOPRIL 20 MG/1
20 TABLET ORAL DAILY
Status: DISCONTINUED | OUTPATIENT
Start: 2022-10-14 | End: 2022-10-17 | Stop reason: HOSPADM

## 2022-10-14 RX ORDER — SODIUM CHLORIDE 9 MG/ML
INJECTION, SOLUTION INTRAVENOUS CONTINUOUS
Status: ACTIVE | OUTPATIENT
Start: 2022-10-14 | End: 2022-10-14

## 2022-10-14 RX ORDER — AMOXICILLIN 250 MG
1 CAPSULE ORAL 2 TIMES DAILY PRN
Status: DISCONTINUED | OUTPATIENT
Start: 2022-10-14 | End: 2022-10-17 | Stop reason: HOSPADM

## 2022-10-14 RX ORDER — ONDANSETRON 2 MG/ML
4 INJECTION INTRAMUSCULAR; INTRAVENOUS EVERY 6 HOURS PRN
Status: DISCONTINUED | OUTPATIENT
Start: 2022-10-14 | End: 2022-10-17 | Stop reason: HOSPADM

## 2022-10-14 RX ORDER — ALBUTEROL SULFATE 90 UG/1
2 AEROSOL, METERED RESPIRATORY (INHALATION) EVERY 6 HOURS PRN
Status: DISCONTINUED | OUTPATIENT
Start: 2022-10-14 | End: 2022-10-17 | Stop reason: HOSPADM

## 2022-10-14 RX ORDER — LORAZEPAM 0.5 MG/1
0.5 TABLET ORAL DAILY PRN
Status: DISCONTINUED | OUTPATIENT
Start: 2022-10-14 | End: 2022-10-17 | Stop reason: HOSPADM

## 2022-10-14 RX ORDER — ONDANSETRON 4 MG/1
4 TABLET, ORALLY DISINTEGRATING ORAL EVERY 6 HOURS PRN
Status: DISCONTINUED | OUTPATIENT
Start: 2022-10-14 | End: 2022-10-17 | Stop reason: HOSPADM

## 2022-10-14 RX ORDER — MULTIVITAMIN,THERAPEUTIC
1 TABLET ORAL DAILY
Status: DISCONTINUED | OUTPATIENT
Start: 2022-10-14 | End: 2022-10-17 | Stop reason: HOSPADM

## 2022-10-14 RX ORDER — SODIUM CHLORIDE 9 MG/ML
INJECTION, SOLUTION INTRAVENOUS CONTINUOUS
Status: ACTIVE | OUTPATIENT
Start: 2022-10-14 | End: 2022-10-15

## 2022-10-14 RX ORDER — ESCITALOPRAM OXALATE 10 MG/1
10 TABLET ORAL DAILY
Status: DISCONTINUED | OUTPATIENT
Start: 2022-10-14 | End: 2022-10-17 | Stop reason: HOSPADM

## 2022-10-14 RX ADMIN — ACETAMINOPHEN 650 MG: 325 TABLET, FILM COATED ORAL at 00:42

## 2022-10-14 RX ADMIN — ACETAMINOPHEN 650 MG: 325 TABLET, FILM COATED ORAL at 19:59

## 2022-10-14 RX ADMIN — ACETAMINOPHEN 650 MG: 325 TABLET, FILM COATED ORAL at 08:23

## 2022-10-14 RX ADMIN — SODIUM CHLORIDE: 9 INJECTION, SOLUTION INTRAVENOUS at 00:43

## 2022-10-14 RX ADMIN — THERA TABS 1 TABLET: TAB at 16:43

## 2022-10-14 RX ADMIN — ESCITALOPRAM OXALATE 10 MG: 10 TABLET ORAL at 08:23

## 2022-10-14 RX ADMIN — TAZOBACTAM SODIUM AND PIPERACILLIN SODIUM 4.5 G: 500; 4 INJECTION, SOLUTION INTRAVENOUS at 22:18

## 2022-10-14 RX ADMIN — LORAZEPAM 0.5 MG: 0.5 TABLET ORAL at 00:42

## 2022-10-14 RX ADMIN — SODIUM CHLORIDE: 9 INJECTION, SOLUTION INTRAVENOUS at 22:19

## 2022-10-14 ASSESSMENT — ACTIVITIES OF DAILY LIVING (ADL)
ADLS_ACUITY_SCORE: 39
DEPENDENT_IADLS:: INDEPENDENT
ADLS_ACUITY_SCORE: 35
ADLS_ACUITY_SCORE: 33
ADLS_ACUITY_SCORE: 39
ADLS_ACUITY_SCORE: 35
ADLS_ACUITY_SCORE: 33
ADLS_ACUITY_SCORE: 33
ADLS_ACUITY_SCORE: 39

## 2022-10-14 NOTE — H&P
Admitted: 10/13/2022    CHIEF COMPLAINT:  Fall, generalized weakness.    HISTORY OF PRESENT ILLNESS:  Ms. Turner is a 77-year-old female with a history of hypertension, who was at [facility] today, walking.  She began to feel weak like her knees were going to give out.  She attempted to find something to sit on, but was unable to and so she fell to the floor.  When asked if she passed out, she said she thought she did, but then she was able to tell me the entire event in detail.  She went home and then apparently had another similar event at home.  This prompted her appearance in the emergency room.    The patient denies any other symptoms.  The patient denies any head trauma.  She denies any injury from the episodes.    On arrival to the ER, vital signs included a blood pressure 190/88 with a heart rate of 93.  No fever.  Saturation 97% on room air.  Workup in the ER included labs and imaging.  White cell count 13.6.  Hemoglobin normal.  Basic metabolic panel notable for sodium 134 and chloride of 97, both low.  Troponin within normal limits.  COVID-19 is pending.  Imaging included a brain MRI showing no acute process.  Unchanged ventriculomegaly, which was also seen on previous head CT scan.  No stroke.    Neck and brain MRA showed mild to moderate focal stenosis of the right posterior cerebral artery.  Otherwise, no significant obstructions in the major intracranial arteries.  A road test was attempted in the ER, and the patient was not able to ambulate without assistance of 2 people.  Admission was requested for observation.    PAST MEDICAL HISTORY:  Hypertension.    MEDICATIONS:  Await pharmacy reconciliation medication list.    ALLERGIES:  NONE.    FAMILY HISTORY:  Reviewed.  Nothing contributory to this admission.    SOCIAL HISTORY:  The patient denies smoking or drinking.  She lives with her  at home.  I asked her about code status, she wishes to be DO NOT RESUSCITATE.  I asked her if her  knows  her wishes and she responded yes.   is not here currently.    REVIEW OF SYSTEMS:  See HPI for details.  Comprehensive greater than 10-point review of systems is otherwise negative besides that detailed above.    PHYSICAL EXAMINATION:    VITAL SIGNS:  Blood pressure is currently 120/65 with a heart rate of 90.  No fever.  Saturation 97% on room air.  GENERAL:  The patient appears nontoxic and in no acute distress.  Appears awake, alert and oriented x3.  I saw her in the Emergency Department.  She was watching TV.  HEENT:  Head is atraumatic.  Sclerae white.  Eyelids normal.  Conjunctivae normal.  Extraocular movements are intact.  NECK:  Supple.  No cervical or supraclavicular lymphadenopathy.  CARDIOVASCULAR:  Heart is regular rate and rhythm.  No significant murmurs.  No lower extremity edema.  LUNGS:  Clear to auscultation bilaterally.  No intercostal retractions.  No conversational dyspnea.  ABDOMEN:  Nontender, nondistended.  EXTREMITIES:  Show no edema.  SKIN:  Reveals no rashes.  No jaundice.  Skin is dry to touch.  NEUROLOGIC:  Cranial nerves II through XII are intact.  Strength including , biceps, triceps, plantar flexion, dorsiflexion, and hip flexors, all appear to be 5/5 bilaterally and symmetric.  Sensation intact to light touch in the upper and lower extremities bilaterally.  I did not try and ambulate the patient.  PSYCHIATRIC:  Awake, alert and oriented x3.  Mood and affect are normal and appropriate.    LABORATORY AND IMAGING DATA:  Reviewed above in HPI.  EKG was personally reviewed.  It shows a sinus rhythm with a heart rate of 90.  Nonpathologic Q-waves in III and aVF noted.    IMPRESSION AND PLAN:  Ms. Turner is a 77-year-old female with a history of hypertension, who presented to the hospital today after a fall/near syncopal event.  The patient reports that her knees buckled and she fell.  There was no injury or trauma.  1.  Generalized weakness, fall, near syncope.  2.  Hyponatremia  and hypochloremia, possibly dehydration related.  3.  Hypertension history.  4.  Ventriculomegaly noted on brain MRI, incidental finding.  This has been noted on head CT scan previously and is unchanged.    PLAN:     1.  Admit to observation.  2.  IV fluids overnight.  3.  Urinalysis to rule out urinary tract infection.  4.  Physical therapy consult.  5.  Observation admission.  6.  Resume appropriate home medications when clarified by Pharmacy.    Candelario Knight MD        D: 10/13/2022   T: 10/14/2022   MT: FILIPE    Name:     SHAUNA SAMS  MRN:      [id number]        Account:     [id number]   :      1945           Admitted:    10/13/2022       Document: [id number]

## 2022-10-14 NOTE — ED NOTES
"[facility]  ED Nurse Handoff Report    Cami Turner is a 77 year old female   ED Chief complaint: falls/weakness  . ED Diagnosis:   Final diagnoses:   Generalized muscle weakness   Multiple falls     Allergies: No Known Allergies    Code Status: Full Code  Activity level - Baseline/Home:  Independent. Activity Level - Current:   Assist X 2. Lift room needed: No. Bariatric: No   Needed: No   Isolation: No. Infection: Not Applicable.     Vital Signs:   Vitals:    10/13/22 2155 10/13/22 2215 10/13/22 2225 10/13/22 2235   BP:       Pulse:       Resp:       Temp:       TempSrc:       SpO2: 97% 94% 95% 96%   Weight:       Height:           Cardiac Rhythm:  ,      Pain level:    Patient confused: No. Patient Falls Risk: Yes.   Elimination Status: Pivots to commode with assist of 2   Patient Report - Initial Complaint: Falls, weakness.   Focused Assessment:     Cami Turner is a 77 year old female with history of hypertension and obesity who presents with falls/weakness. The patient reports that she felt lightheaded like she might faint while shopping at [employer] earlier today. States she tried to find a chair and was not able to get to it fast enough and so she fell. Notes that after she got home, she felt the same sensation and fell again. Adds that both falls were unwitnessed. Of note, the patient reports onset of weakness in her legs like they might \"give out\" yesterday but notes that her lightheadedness started today. States that prior to both falls today, she left like her legs might \"give out\" and that she was lightheaded. Denies head trauma from both falls. Denies nausea, diaphoresis, diarrhea, leg swelling, fever, shortness of breath, palpitations, dysuria, frequency, and known sick exposures.   Tests Performed: Labs, imaging.   Abnormal Results: .  Labs Ordered and Resulted from Time of ED Arrival to Time of ED Departure   BASIC METABOLIC PANEL - Abnormal       Result Value    Sodium 134 " (*)     Potassium 4.1      Chloride 97 (*)     Carbon Dioxide (CO2) 27      Anion Gap 10      Urea Nitrogen 13.0      Creatinine 0.87      Calcium 9.2      Glucose 107 (*)     GFR Estimate 67     CBC WITH PLATELETS AND DIFFERENTIAL - Abnormal    WBC Count 13.6 (*)     RBC Count 4.25      Hemoglobin 12.5      Hematocrit 39.5      MCV 93      MCH 29.4      MCHC 31.6      RDW 14.3      Platelet Count 230      % Neutrophils 88      % Lymphocytes 8      % Monocytes 4      % Eosinophils 0      % Basophils 0      % Immature Granulocytes 0      NRBCs per 100 WBC 0      Absolute Neutrophils 12.0 (*)     Absolute Lymphocytes 1.0      Absolute Monocytes 0.5      Absolute Eosinophils 0.0      Absolute Basophils 0.0      Absolute Immature Granulocytes 0.1      Absolute NRBCs 0.0     INR - Normal    INR 1.10     PARTIAL THROMBOPLASTIN TIME - Normal    aPTT 29     TROPONIN T, HIGH SENSITIVITY - Normal    Troponin T, High Sensitivity 14     MAGNESIUM - Normal    Magnesium 1.9     PHOSPHORUS - Normal    Phosphorus 2.8     GLUCOSE MONITOR NURSING POCT   ROUTINE UA WITH MICROSCOPIC REFLEX TO CULTURE   COVID-19 VIRUS (CORONAVIRUS) BY PCR     MR Brain w/o & w Contrast   Final Result   IMPRESSION:   1.  No acute intracranial process identified.   2.  Unchanged ventriculomegaly disproportionate to the degree of sulcal prominence and other findings that raise concern for the possibility of chronic communicating/normal pressure hydrocephalus. Recommend clinical correlation. The degree of    ventriculomegaly is unchanged from the prior CT.   3.  Brain parenchymal volume loss and presumed chronic small vessel ischemic changes, as described.   4.  Patchy nonspecific T2 hyperintense marrow signal and enhancement involving the right mandibular condyle in the region of the right temporomandibular joint. This may be reactive to degenerative arthropathy. Recommend clinical correlation.      MRA Neck (Carotids) wo & w Contrast   Final Result    IMPRESSION:   1.  No significant stenosis or occlusion of the cervical carotid or vertebral arteries.   2.  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      MRA Brain ([place]) wo Contrast   Final Result   IMPRESSION:   1. New mild to moderate focal stenosis of the distal P1 segment of the right posterior cerebral artery.   2. Otherwise, no significant proximal arterial stenosis or large vessel occlusion identified involving the major intracranial arteries.   3. Developmentally small right vertebral artery appears to terminate in the posteroinferior cerebellar artery, a normal variant. This is unchanged from prior.         Treatments provided: See MAR  Family Comments: Daughter at bedside  OBS brochure/video discussed/provided to patient:  Yes  ED Medications:   Medications   sodium chloride 0.9% infusion ( Intravenous New Bag 10/13/22 6779)   gadobutrol (GADAVIST) injection 10 mL (10 mLs Intravenous Given 10/13/22 1844)   sodium chloride (PF) 0.9% PF flush 100 mL (100 mLs Intravenous Given 10/13/22 1844)     Drips infusing:  No  For the majority of the shift, the patient's behavior Green. Interventions performed were Frequent rounding.    Sepsis treatment initiated: No     Patient tested for COVID 19 prior to admission: YES    ED Nurse Name/Phone Number: Cindy Daugherty RN,   10:47 PM  RECEIVING UNIT ED HANDOFF REVIEW    Above ED Nurse Handoff Report was reviewed: Yes  Reviewed by: Fauzia Dorado RN on October 13, 2022 at 11:55 PM

## 2022-10-14 NOTE — PLAN OF CARE
"PRIMARY DIAGNOSIS: GENERALIZED WEAKNESS/MULTIPLE FALLS     OUTPATIENT/OBSERVATION GOALS TO BE MET BEFORE DISCHARGE  1. Orthostatic performed: N/A    2. Tolerating PO medications: Yes    3. Return to near baseline physical activity: Yes    4. Cleared for discharge by consultants (if involved): No    Discharge Planner Nurse   Safe discharge environment identified: No  Barriers to discharge: Yes       Entered by: Isabela Masters 10/14/2022 2:30 PM     Please review provider order for any additional goals.   Nurse to notify provider when observation goals have been met and patient is ready for discharge.  BP (!) 98/29 (BP Location: Right arm)   Pulse 59   Temp 98.7  F (37.1  C) (Temporal)   Resp 18   Ht 1.575 m (5' 2.01\")   Wt 86.5 kg (190 lb 11.2 oz)   LMP  (LMP Unknown)   SpO2 93%   BMI 34.87 kg/m        UA collected and resulted, pending UC. Voiding up to bedside commode. A1 g/w, no reports of lightheadedness or dizziness during transfers. PT following. Straight cath orders obtained PRN. SW consulted. Pts daughter was wondering if an ECHO was appropriate, MD paged. PVR 33ccs.       "

## 2022-10-14 NOTE — PROGRESS NOTES
[facility]  Medicine Progress Note - Hospitalist Service  Date of Admission:  10/13/2022    Assessment & Plan        Ms. Turner is a 77-year-old female with a history of hypertension, who presented to the hospital after a fall/near syncopal event 10/14.    Unclear if this was a mechanical far or presyncopal/syncopal episode.  Pending continued monitoring and clinical improvement with plans for discharge on 10/15.    Generalized weakness, fall, near syncope vs syncope   Leukocytosis, resolved   She began to feel weak like her knees were going to give out.  She attempted to find something to sit on, but was unable to and so she fell to the floor.  When asked if she passed out, she said she thought she did, but then she was able to tell me the entire event in detail.  She went home and then apparently had another similar event at home.   Presented to the ED, where blood pressure 190/88 with a heart rate of 93. sodium 134 and chloride of 97, both low.  Troponin within normal limits. Imaging included a brain MRI showing no acute process.  Unchanged ventriculomegaly, which was also seen on previous head CT scan. Possibility of normal pressure hydrocephalus.  No stroke. Neck and brain MRA showed mild to moderate focal stenosis of the right posterior cerebral artery.  Otherwise, no significant obstructions in the major intracranial arteries, patient was not able to ambulate without assistance of 2 people in ED so was admitted  - TTE pending given questionable syncope   - IV fluids until tolerating PO intake reliably   - Urinalysis to rule out urinary tract infection , pending    No indication for antibiotics at this time   - Physical therapy consult to eval function     Hyponatremia and hypochloremia, possibly dehydration related  - IV fluids until tolerating PO intake     Hypertension history  - PTA lisinopril     Ventriculomegaly noted on brain MRI, incidental finding   This has been noted on head  CT scan previously and is unchanged     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  - PCP follow up    - Lipid panel added on     Obesity Estimated body mass index is 34.87 kg/m  - noted      Diet: Regular Diet Adult    DVT Prophylaxis: Pneumatic Compression Devices  Casiano Catheter: Not present  Central Lines: None  Cardiac Monitoring: ACTIVE order. Indication: Syncope- low cardiac risk (24 hours)  Code Status: No CPR- Do NOT Intubate      Disposition Plan      Expected Discharge Date: 10/15/2022      Destination: home with help/services;nursing home          The patient's care was discussed with the Bedside Nurse, Patient and Patient's Family.    Teresita Browne,   Hospitalist Service  [facility]  Securely message with the Vocera Web Console (learn more here)  Text page via [employer] Paging/Directory       Clinically Significant Risk Factors Present on Admission                 ______________________________________________________________________    Interval History   Patient is feeling well today.  She denies any fever or chills.   She has no chest pain pressure or shortness of breath. She denies any  or GI symptoms.  She does have a history of psoriasis but reports that her lesions have been under control with no active flares or other changes    Her daughter does report that she has had a couple falls in the past due to weakness such as the one that caused her presentation this time  She has had decreased p.o. intake in the past few weeks but really unclear source of this  She has no other concerns or complaints today    Data reviewed today: I reviewed all medications, new labs and imaging results over the last 24 hours. I personally reviewed no images or EKG's today.    Physical Exam   Vital Signs: Temp: 98.7  F (37.1  C) Temp src: Temporal BP: (!) 98/29 Pulse: 59   Resp:  18 SpO2: 93 % O2 Device: None (Room air)    Weight: 190 lbs 11.2 oz     Constitutional: awake, alert, cooperative, no apparent distress, and appears stated age  HEENT: Normocephalic, atraumatic  Respiratory: Normal work of breathing, good air exchange, clear to auscultation bilaterally, no crackles or wheezing   Cardiovascular: Regular rate and rhythm, no murmurs appreciated  GI: Soft and nontender to palpation, nondistended, no rebound or guarding  Skin: Guttate psoriasis plaques to bilateral lower extremities and abdomen visualized, no evidence of cellulitis or other skin infection  normal skin color, texture, turgor  Musculoskeletal: No deformities, no edema present  Neurologic: Alert and oriented, no focal deficits   Neuropsychiatric: General: normal, calm and normal eye contact     Data   Recent Labs   Lab 10/14/22  0944 10/13/22  1801   WBC 9.5 13.6*   HGB 10.7* 12.5   MCV 92 93    230   INR  --  1.10    134*   POTASSIUM 3.9 4.1   CHLORIDE 103 97*   CO2 22 27   BUN 14.7 13.0   CR 0.85 0.87   ANIONGAP 12 10   MARILYN 8.5* 9.2   GLC 92 107*     Recent Results (from the past 24 hour(s))   MRA Brain ([place] [facility] [place]) wo Contrast    Narrative    EXAM: MRA BRAIN ([place] [facility] [place]) W/O CONTRAST  LOCATION: [facility]  DATE/TIME: 10/13/2022 7:41 PM    INDICATION: Weakness, multiple falls, difficulty following commands to puff out cheeks; concern for stroke.  COMPARISON: CT angiogram of the head and neck dated 6/5/2021.  TECHNIQUE: 3D time-of-flight head MRA without intravenous contrast.    FINDINGS: The intracranial internal carotid arteries are patent without definite flow-limiting stenosis. The major proximal branches of the anterior cerebral and middle cerebral arteries are patent, without flow-limiting stenosis. Dominant left and   developmentally smaller right vertebral arteries are visualized. The right vertebral artery appears to terminate in the right posteroinferior cerebellar  artery, as seen on the prior CTA. The left vertebral artery supplies the basilar artery, and appears   patent. The basilar artery is patent. The proximal major branches of the posterior cerebral arteries appear patent. There appears to be up to moderate focal stenosis of the distal P1 segment of the right posterior cerebral artery near the P1-P2 junction   (series 1037 image 1) new compared to the prior CTA exam. No significant stenosis of the proximal aspects of the left posterior cerebral artery noted. No intracranial aneurysm or high-flow vascular malformation identified.      Impression    IMPRESSION:  1. New mild to moderate focal stenosis of the distal P1 segment of the right posterior cerebral artery.  2. Otherwise, no significant proximal arterial stenosis or large vessel occlusion identified involving the major intracranial arteries.  3. Developmentally small right vertebral artery appears to terminate in the posteroinferior cerebellar artery, a normal variant. This is unchanged from prior.   MRA Neck (Carotids) wo & w Contrast    Narrative    EXAM: MRA NECK (CAROTIDS) W/O and W CONTRAST  LOCATION: [facility]  DATE/TIME: 10/13/2022 7:42 PM    INDICATION: Weakness, multiple falls, difficulty following commands to puff out cheeks; concern for stroke.  COMPARISON: CT angiogram head and neck 6/5/2021.  CONTRAST: 10 mL Gadavist  TECHNIQUE: Neck MRA without and with IV contrast. Stenosis measurements made according to NASCET criteria unless otherwise specified.    FINDINGS: There is significant motion-related artifact on the axial T1 fat-suppressed sequence, limiting evaluation for the possibility of intramural hematoma.    RIGHT CAROTID: No measurable stenosis or dissection.    LEFT CAROTID: No measurable stenosis or dissection.    VERTEBRAL ARTERIES: No focal stenosis or dissection. Dominant left and smaller right vertebral arteries.    AORTIC ARCH: Bovine origin left common carotid  artery. No stenosis of the origin of the brachiocephalic artery or the left subclavian artery from the aortic arch. There is again severe atherosclerotic stenosis at the origin of the right subclavian   artery, as seen on prior CTA.      Impression    IMPRESSION:  1.  No significant stenosis or occlusion of the cervical carotid or vertebral arteries.  2.  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   MR Brain w/o & w Contrast    Narrative    EXAM: MR BRAIN W/O and W CONTRAST  LOCATION: [facility]  DATE/TIME: 10/13/2022 7:42 PM    INDICATION: Weakness, multiple falls, difficulty following commands to puff out cheeks; concern for stroke.  COMPARISON: CT head and CTA head and neck 6/5/2021.  CONTRAST: 10 mL Gadavist  TECHNIQUE: Routine multiplanar multisequence head MRI without and with intravenous contrast.    FINDINGS:  INTRACRANIAL CONTENTS: No abnormal intracranial restricted diffusion identified to suggest recent infarct. As before, there is mild to moderate/moderate supratentorial ventriculomegaly disproportionate to the degree of sulcal prominence. This appears   similar to prior. There is relative crowding of the sulci at the frontoparietal vertex. There is a somewhat narrowed callosal angle. There is mild prominence of the sylvian fissures, suggesting mild perisylvian parenchymal volume loss. Mild generalized   cerebellar volume loss. This is unchanged. Mild patchy nonspecific T2/FLAIR hyperintense signal abnormalities in the cerebral white matter, likely due to chronic small vessel ischemic change. No acute intracranial hemorrhage, extra axial fluid   collection, mass lesion, or mass effect/herniation. No abnormal intracranial postcontrast enhancement is identified. The major arterial flow-voids of the skull base are grossly maintained.    SELLA: No abnormality accounting for technique.    ORBITS: Prior bilateral cataract  surgery. Visualized portions of the orbits are otherwise unremarkable.     SINUSES/MASTOIDS: As before, complete opacification of the right maxillary sinus with internal diffusion-restricting material, likely representing complex/inspissated or purulent secretions. Mild to moderate mucosal thickening noted in the ethmoid   sinuses. Minimal fluid in the mastoid tips bilaterally.     OTHER: There is patchy T2 hyperintense signal and enhancement involving the right mandibular condyle, which is nonspecific. This may be reactive to degenerative changes. Otherwise unremarkable bone marrow signal.      Impression    IMPRESSION:  1.  No acute intracranial process identified.  2.  Unchanged ventriculomegaly disproportionate to the degree of sulcal prominence and other findings that raise concern for the possibility of chronic communicating/normal pressure hydrocephalus. Recommend clinical correlation. The degree of   ventriculomegaly is unchanged from the prior CT.  3.  Brain parenchymal volume loss and presumed chronic small vessel ischemic changes, as described.  4.  Patchy nonspecific T2 hyperintense marrow signal and enhancement involving the right mandibular condyle in the region of the right temporomandibular joint. This may be reactive to degenerative arthropathy. Recommend clinical correlation.     Medications       escitalopram  10 mg Oral Daily     lisinopril  20 mg Oral Daily

## 2022-10-14 NOTE — PLAN OF CARE
Pt is alert and oriented, came up around 1 am from ER. No pain. T was 100.8. prn Tylenol given T went down to 99.1, then 98.1, Redness to L groin noted, warm, itching per pt. Per pt she has psoriasis. Rash noted to abd folds, under both breasts. NS running at 100 ml/hr. Pt was not able to urinate. Still needs urine sample. Transferred with assist of 2 to commode.

## 2022-10-14 NOTE — PLAN OF CARE
"PRIMARY DIAGNOSIS: GENERALIZED WEAKNESS/MULTIPLE FALLS     OUTPATIENT/OBSERVATION GOALS TO BE MET BEFORE DISCHARGE  1. Orthostatic performed: N/A    2. Tolerating PO medications: Yes    3. Return to near baseline physical activity: Yes    4. Cleared for discharge by consultants (if involved): No    Discharge Planner Nurse   Safe discharge environment identified: No  Barriers to discharge: Yes       Entered by: Isabela Masters 10/14/2022 6:08 PM     Please review provider order for any additional goals.   Nurse to notify provider when observation goals have been met and patient is ready for discharge.    BP (!) 165/67 (BP Location: Right arm)   Pulse 89   Temp (!) 100.8  F (38.2  C) (Temporal)   Resp 20   Ht 1.575 m (5' 2.01\")   Wt 86.5 kg (190 lb 11.2 oz)   LMP  (LMP Unknown)   SpO2 94%   BMI 34.87 kg/m      UA collected and resulted, pending UC. Voiding up to bedside commode. A1 g/w, no reports of lightheadedness or dizziness during transfers. PT following. Straight cath orders obtained PRN. SW consulted. PVR 33ccs. ECHO in am.     "

## 2022-10-14 NOTE — PROGRESS NOTES
10/14/22 1629   Appointment Info   Signing Clinician's Name / Credentials (PT) Nadira Nava DPT   Living Environment   People in Home spouse   Current Living Arrangements house   Living Environment Comments Patient lives with her spouse; is primary caregiver for  with dementia.  Patient lives in single level home, 2 steps to enter, has basement but does not go down to basement frequently   Self-Care   Usual Activity Tolerance good   Current Activity Tolerance poor   Equipment Currently Used at Home none   Fall history within last six months yes   Number of times patient has fallen within last six months 1   Activity/Exercise/Self-Care Comment Patient reports she is typically independent with mobility.  Began feeling weak over the past week, eventually experiencing a fall at [facility].   General Information   Onset of Illness/Injury or Date of Surgery 10/13/22   Referring Physician Candelario Knight MD   Patient/Family Therapy Goals Statement (PT) To get stronger, be able to return to home   Pertinent History of Current Problem (include personal factors and/or comorbidities that impact the POC) Per medical chart: Ms. Turner is a 77-year-old female with a history of hypertension, who was at [facility] today, walking.  She began to feel weak like her knees were going to give out.  She attempted to find something to sit on, but was unable to and so she fell to the floor.  When asked if she passed out, she said she thought she did, but then she was able to tell me the entire event in detail.  She went home and then apparently had another similar event at home.   Existing Precautions/Restrictions fall   Cognition   Orientation Status (Cognition) oriented x 4   Pain Assessment   Patient Currently in Pain No   Posture    Posture Forward head position;Protracted shoulders   Range of Motion (ROM)   Range of Motion ROM is WFL   Strength (Manual Muscle Testing)   Strength (Manual Muscle Testing) Deficits observed  during functional mobility   Bed Mobility   Comment, (Bed Mobility) Independent   Transfers   Transfers sit-stand transfer   Sit-Stand Transfer   Sit-Stand Dare (Transfers) contact guard;verbal cues   Assistive Device (Sit-Stand Transfers) walker, front-wheeled   Gait/Stairs (Locomotion)   Dare Level (Gait) contact guard;set up;verbal cues   Assistive Device (Gait) walker, front-wheeled   Distance in Feet (Required for LE Total Joints) 5(eval)+20   Comment, (Gait/Stairs) Patient with very slow gait speed, decreased foot clearance, increased reliance on UE support at walker.   Balance   Balance Comments Patient with increased risk of falls, gait abnormalities, reliance on UE support at walker   Sensory Examination   Sensory Perception patient reports no sensory changes   Coordination   Coordination no deficits were identified   Muscle Tone   Muscle Tone no deficits were identified   Clinical Impression   Criteria for Skilled Therapeutic Intervention Yes, treatment indicated   PT Diagnosis (PT) Impaired functional mobility   Influenced by the following impairments decreased strength, activity tolerance, balance   Functional limitations due to impairments difficulties with gait, transfers   Clinical Presentation (PT Evaluation Complexity) Stable/Uncomplicated   Clinical Presentation Rationale non complex pmh, stable presentation, good social support   Clinical Decision Making (Complexity) low complexity   Planned Therapy Interventions (PT) balance training;home exercise program;patient/family education;stair training;strengthening;transfer training;progressive activity/exercise;home program guidelines   Anticipated Equipment Needs at Discharge (PT)   (has walker at home)   Risk & Benefits of therapy have been explained care plan/treatment goals reviewed;evaluation/treatment results reviewed;risks/benefits reviewed;current/potential barriers reviewed;participants voiced agreement with care  plan;participants included;patient;daughter   PT Discharge Planning   PT Discharge Recommendation (DC Rec) home with assist;home with home care physical therapy;Transitional Care Facility   PT Rationale for DC Rec Patient presents significantly below baseline for functional mobility; at baseline patient is independent with mobility and cares for her  who has dementia.  Patient currently requires Ax1 for ambulation with 2WW, able to demonstrate approx 25 feet ambulation before reporting severe fatigue and shakiness.  Recommend return to home with 24/7 assist from family members for all ambulation, ADLs and IADLs, Home PT/OT/HHA, use of walker with all mobility, shower chair for bathing.  If family is unable to provide this level of assistance, recommend TCU in order to increase strength, activity tolerance and independence with mobility.

## 2022-10-14 NOTE — PHARMACY-ADMISSION MEDICATION HISTORY
Admission medication history interview status for this patient is complete. See [facility] admission navigator for allergy information, prior to admission medications and immunization status.     Medication history interview done, indicate source(s): Patient  Medication history resources (including written lists, pill bottles, clinic record): [facility] dispense records  Pharmacy: [facility] [place]    Changes made to PTA medication list:  Added: none  Changed: albuterol --> PRN  Reported as Not Taking: duplicate escitalopram  Removed: valtrex    Actions taken by pharmacist (provider contacted, etc):None     Additional medication history information:None    Medication reconciliation/reorder completed by provider prior to medication history?  N   (Y/N)         Prior to Admission medications    Medication Sig Last Dose Taking? Auth Provider Long Term End Date   albuterol (PROAIR HFA/PROVENTIL HFA/VENTOLIN HFA) 108 (90 Base) MCG/ACT inhaler Inhale 2 puffs into the lungs every 6 hours  Patient taking differently: Inhale 2 puffs into the lungs every 6 hours as needed Unknown Yes Peña Gandhi MD Yes    clobetasol (TEMOVATE) 0.05 % external solution Apply to scalp BID x 1-2 weeks PRN flares Unknown Yes Ree Arzola PA-C     escitalopram (LEXAPRO) 10 MG tablet Take 1 tablet (10 mg) by mouth daily 10/13/2022 at am Yes Tisha Trevino CNP Yes    hydrocortisone 2.5 % cream Apply to eyelids every day x 7-10 days then PRN only Unknown Yes Ree Arzola PA-C     lisinopril (ZESTRIL) 20 MG tablet TAKE 1 TABLET EVERY DAY 10/13/2022 at am Yes Tisha Trevino CNP Yes    LORazepam (ATIVAN) 0.5 MG tablet TAKE 1 TABLET DAILY AS NEEDED FOR ANXIETY Unknown Yes Zachary Palomino MD     mometasone (ELOCON) 0.1 % external solution Apply topically daily Apply to scalp once daily (lather and let sit for 5 minutes then wash off) for 7 days and then use as needed for flare ups Unknown Yes Trina Milner APRN CNP     nystatin  (MYCOSTATIN) [id number] UNIT/GM external cream Apply topically 2 times daily  Patient taking differently: Apply topically daily as needed Unknown Yes Tisha Trevino CNP     triamcinolone (KENALOG) 0.1 % external cream Apply to on the legs, trunk, arms, groin BID x 2-3 weeks PRN flares Unknown Yes Ree Arzola PA-C     escitalopram (LEXAPRO) 10 MG tablet TAKE 1 TABLET EVERY DAY  Patient not taking: Reported on 10/13/2022 Not Taking  Zachary Palomino MD Yes    triamcinolone (KENALOG) 0.025 % cream Apply topically 2 times daily  Patient not taking: No sig reported   Trina Milner, LYNDSEY CNP

## 2022-10-14 NOTE — ED NOTES
RN assisted patient in getting up to use the bathroom. Patient very weak on feet, unable to tolerate ambulation beyond pivot to bedside commode. Patient reports some lightheadedness/dizziness during transfer.

## 2022-10-14 NOTE — CONSULTS
Care Management Initial Consult    General Information  Assessment completed with: Patient, Children,    Type of CM/SW Visit: Initial Assessment    Primary Care Provider verified and updated as needed: Yes   Readmission within the last 30 days: no previous admission in last 30 days      Reason for Consult: discharge planning  Advance Care Planning:            Communication Assessment  Patient's communication style: spoken language (English or Bilingual)    Hearing Difficulty or Deaf: no        Cognitive  Cognitive/Neuro/Behavioral: WDL                      Living Environment:   People in home: spouse     Current living Arrangements:        Able to return to prior arrangements: other (see comments)  Living Arrangement Comments: Family meeting to determine if they can assist pt until mobility increases    Family/Social Support:  Care provided by: self  Provides care for: spouse  Marital Status:   , Children          Description of Support System: Supportive, Involved    Support Assessment: Adequate family and caregiver support    Current Resources:   Patient receiving home care services: No     Community Resources: None  Equipment currently used at home:    Supplies currently used at home:      Employment/Financial:  Employment Status:          Financial Concerns:             Lifestyle & Psychosocial Needs:  Social Determinants of Health     Tobacco Use: Low Risk      Smoking Tobacco Use: Never     Smokeless Tobacco Use: Never     Passive Exposure: Not on file   Alcohol Use: Not on file   Financial Resource Strain: Not on file   Food Insecurity: Not on file   Transportation Needs: Not on file   Physical Activity: Not on file   Stress: Not on file   Social Connections: Not on file   Intimate Partner Violence: Not on file   Depression: Not at risk     PHQ-2 Score: 0   Housing Stability: Not on file       Functional Status:  Prior to admission patient needed assistance:      Dependent IADLs::  Independent  Assesssment of Functional Status: Not at baseline with ADL Functioning    Mental Health Status:          Chemical Dependency Status:                Values/Beliefs:  Spiritual, Cultural Beliefs, Jewish Practices, Values that affect care:                 Additional Information:    Met with pt/daughter at bedside to discuss discharge planning and to provide community resources. Pt/daughter explained that pt lives at home with her spouse who has dementia. Pt provides cares for spouse, family is currently caring for pt spouse while pt recovers. Discussed PT recommendations of home with homecare and 24/7 assist with family versus TCU. Pt/daughter would like to discuss with family and see how PT goes tomorrow before deciding discharge disposition. Pt/daughter inquired about [facility]/home resources. Provided private pay homecare resources, senior linkage line pamphlet, [place] choice assessment information, adult daycares in the area, and senior housing guide. SW following.     KRYSTAL Kaur, [facility]  Inpatient Care Coordination  Ortho/Spine Unit  [phone]  Isaura Latham, [facility]

## 2022-10-15 ENCOUNTER — APPOINTMENT (OUTPATIENT)
Dept: CARDIOLOGY | Facility: CLINIC | Age: 77
DRG: 872 | End: 2022-10-15
Attending: STUDENT IN AN ORGANIZED HEALTH CARE EDUCATION/TRAINING PROGRAM
Payer: MEDICARE

## 2022-10-15 ENCOUNTER — APPOINTMENT (OUTPATIENT)
Dept: PHYSICAL THERAPY | Facility: CLINIC | Age: 77
DRG: 872 | End: 2022-10-15
Payer: MEDICARE

## 2022-10-15 PROBLEM — R29.6 MULTIPLE FALLS: Status: ACTIVE | Noted: 2022-10-15

## 2022-10-15 PROBLEM — M62.81 GENERALIZED MUSCLE WEAKNESS: Status: ACTIVE | Noted: 2022-10-15

## 2022-10-15 LAB
ANION GAP SERPL CALCULATED.3IONS-SCNC: 14 MMOL/L (ref 7–15)
BUN SERPL-MCNC: 11.9 MG/DL (ref 8–23)
CALCIUM SERPL-MCNC: 8.3 MG/DL (ref 8.8–10.2)
CHLORIDE SERPL-SCNC: 104 MMOL/L (ref 98–107)
CREAT SERPL-MCNC: 0.81 MG/DL (ref 0.51–0.95)
DEPRECATED HCO3 PLAS-SCNC: 19 MMOL/L (ref 22–29)
GFR SERPL CREATININE-BSD FRML MDRD: 73 ML/MIN/1.73M2
GLUCOSE SERPL-MCNC: 80 MG/DL (ref 70–99)
LVEF ECHO: NORMAL
MAGNESIUM SERPL-MCNC: 1.7 MG/DL (ref 1.7–2.3)
PHOSPHATE SERPL-MCNC: 3 MG/DL (ref 2.5–4.5)
POTASSIUM SERPL-SCNC: 3.6 MMOL/L (ref 3.4–5.3)
SODIUM SERPL-SCNC: 137 MMOL/L (ref 136–145)

## 2022-10-15 PROCEDURE — 250N000013 HC RX MED GY IP 250 OP 250 PS 637: Performed by: INTERNAL MEDICINE

## 2022-10-15 PROCEDURE — G0378 HOSPITAL OBSERVATION PER HR: HCPCS

## 2022-10-15 PROCEDURE — 258N000003 HC RX IP 258 OP 636: Performed by: STUDENT IN AN ORGANIZED HEALTH CARE EDUCATION/TRAINING PROGRAM

## 2022-10-15 PROCEDURE — 93306 TTE W/DOPPLER COMPLETE: CPT | Mod: 26 | Performed by: INTERNAL MEDICINE

## 2022-10-15 PROCEDURE — 250N000011 HC RX IP 250 OP 636: Performed by: STUDENT IN AN ORGANIZED HEALTH CARE EDUCATION/TRAINING PROGRAM

## 2022-10-15 PROCEDURE — 84100 ASSAY OF PHOSPHORUS: CPT | Performed by: STUDENT IN AN ORGANIZED HEALTH CARE EDUCATION/TRAINING PROGRAM

## 2022-10-15 PROCEDURE — 97530 THERAPEUTIC ACTIVITIES: CPT | Mod: GP

## 2022-10-15 PROCEDURE — 82310 ASSAY OF CALCIUM: CPT | Performed by: STUDENT IN AN ORGANIZED HEALTH CARE EDUCATION/TRAINING PROGRAM

## 2022-10-15 PROCEDURE — 36415 COLL VENOUS BLD VENIPUNCTURE: CPT | Performed by: STUDENT IN AN ORGANIZED HEALTH CARE EDUCATION/TRAINING PROGRAM

## 2022-10-15 PROCEDURE — 250N000013 HC RX MED GY IP 250 OP 250 PS 637: Performed by: STUDENT IN AN ORGANIZED HEALTH CARE EDUCATION/TRAINING PROGRAM

## 2022-10-15 PROCEDURE — 120N000001 HC R&B MED SURG/OB

## 2022-10-15 PROCEDURE — 83735 ASSAY OF MAGNESIUM: CPT | Performed by: STUDENT IN AN ORGANIZED HEALTH CARE EDUCATION/TRAINING PROGRAM

## 2022-10-15 PROCEDURE — 93306 TTE W/DOPPLER COMPLETE: CPT

## 2022-10-15 PROCEDURE — 99233 SBSQ HOSP IP/OBS HIGH 50: CPT | Performed by: INTERNAL MEDICINE

## 2022-10-15 RX ORDER — TRIAMCINOLONE ACETONIDE 0.25 MG/G
CREAM TOPICAL 2 TIMES DAILY
Status: DISCONTINUED | OUTPATIENT
Start: 2022-10-15 | End: 2022-10-17 | Stop reason: HOSPADM

## 2022-10-15 RX ADMIN — TAZOBACTAM SODIUM AND PIPERACILLIN SODIUM 4.5 G: 500; 4 INJECTION, SOLUTION INTRAVENOUS at 17:28

## 2022-10-15 RX ADMIN — LISINOPRIL 20 MG: 20 TABLET ORAL at 09:08

## 2022-10-15 RX ADMIN — TRIAMCINOLONE ACETONIDE: 0.25 CREAM TOPICAL at 12:07

## 2022-10-15 RX ADMIN — TAZOBACTAM SODIUM AND PIPERACILLIN SODIUM 4.5 G: 500; 4 INJECTION, SOLUTION INTRAVENOUS at 04:08

## 2022-10-15 RX ADMIN — SODIUM CHLORIDE: 9 INJECTION, SOLUTION INTRAVENOUS at 09:10

## 2022-10-15 RX ADMIN — ALBUTEROL SULFATE 2 PUFF: 90 AEROSOL, METERED RESPIRATORY (INHALATION) at 22:45

## 2022-10-15 RX ADMIN — TAZOBACTAM SODIUM AND PIPERACILLIN SODIUM 4.5 G: 500; 4 INJECTION, SOLUTION INTRAVENOUS at 09:23

## 2022-10-15 RX ADMIN — THERA TABS 1 TABLET: TAB at 09:08

## 2022-10-15 RX ADMIN — ESCITALOPRAM OXALATE 10 MG: 10 TABLET ORAL at 09:08

## 2022-10-15 RX ADMIN — TRIAMCINOLONE ACETONIDE: 0.25 CREAM TOPICAL at 22:45

## 2022-10-15 ASSESSMENT — ACTIVITIES OF DAILY LIVING (ADL)
ADLS_ACUITY_SCORE: 39
ADLS_ACUITY_SCORE: 36
ADLS_ACUITY_SCORE: 39
ADLS_ACUITY_SCORE: 36
ADLS_ACUITY_SCORE: 39
ADLS_ACUITY_SCORE: 39
ADLS_ACUITY_SCORE: 36
ADLS_ACUITY_SCORE: 36
ADLS_ACUITY_SCORE: 39
ADLS_ACUITY_SCORE: 36
ADLS_ACUITY_SCORE: 36
ADLS_ACUITY_SCORE: 39

## 2022-10-15 NOTE — PLAN OF CARE
PRIMARY DIAGNOSIS: GENERALIZED WEAKNESS    OUTPATIENT/OBSERVATION GOALS TO BE MET BEFORE DISCHARGE  1. Orthostatic performed: N/A    2. Tolerating PO medications: Yes    3. Return to near baseline physical activity: No, Ax1 w/ walker & GB, baseline ind. Working w/ PT    4. Cleared for discharge by consultants (if involved): No    Discharge Planner Nurse   Safe discharge environment identified: No  Barriers to discharge: Yes       Entered by: Kasey Agee RN 10/15/2022 1:55 AM     Please review provider order for any additional goals.   Nurse to notify provider when observation goals have been met and patient is ready for discharge.       Pt temp spiked to 103.2, sepsis screen done, frequent vitals & lactic done. Lactic 0.9, temp went down to 98.2. MD notified & started on zosyn. UC pending.  Pt is A&Ox4. Ax1 w/ GB to commode, dizziness & MOODY. PT following. Redness on L hip/leg. Denies pain. ECHO in AM.

## 2022-10-15 NOTE — PLAN OF CARE
PRIMARY DIAGNOSIS: GENERALIZED WEAKNESS    OUTPATIENT/OBSERVATION GOALS TO BE MET BEFORE DISCHARGE  1. Orthostatic performed: N/A    2. Tolerating PO medications: Yes    3. Return to near baseline physical activity: No, Ax1 w/ walker & GB, baseline ind. Working w/ PT    4. Cleared for discharge by consultants (if involved): No    Discharge Planner Nurse   Safe discharge environment identified: No  Barriers to discharge: Yes       Entered by: Kasey Agee RN 10/15/2022 7:00 AM     Please review provider order for any additional goals.   Nurse to notify provider when observation goals have been met and patient is ready for discharge.       Pt temp spiked to 103.2 last night, sepsis screen done, frequent vitals & lactic done. Lactic 0.9, temp went down to 98.2. VSS overnight, afebrile. MD notified & started on zosyn. UC pending.  Pt is A&Ox4. Ax1 w/ GB to commode, dizziness & MOODY. PT following. Redness on L hip/leg. Denies pain. ECHO in AM. PT consulted.

## 2022-10-15 NOTE — PROVIDER NOTIFICATION
MD notified @ 2100: FYI- Pt temp spiked to 103.2 at beginning of shift, tylenol/Ice packs given, sepsis prot done- lactic 0.9. Temp now 98.2,. Thanks!    -New order for Zosyn q6h

## 2022-10-15 NOTE — PLAN OF CARE
PRIMARY DIAGNOSIS: GENERALIZED WEAKNESS    OUTPATIENT/OBSERVATION GOALS TO BE MET BEFORE DISCHARGE  1. Orthostatic performed: N/A    2. Tolerating PO medications: Yes    3. Return to near baseline physical activity: No, Ax1 w/ walker & GB, baseline ind. Working w/ PT    4. Cleared for discharge by consultants (if involved): No    Discharge Planner Nurse   Safe discharge environment identified: No  Barriers to discharge: Yes       Entered by: Kasey Agee RN 10/15/2022 6:59 AM     Please review provider order for any additional goals.   Nurse to notify provider when observation goals have been met and patient is ready for discharge.       Pt temp spiked to 103.2, sepsis screen done, frequent vitals & lactic done. Lactic 0.9, temp went down to 98.2. MD notified & started on zosyn. UC pending.  Pt is A&Ox4. Ax1 w/ GB to commode, dizziness & MOODY. PT following. Redness on L hip/leg. Denies pain. ECHO in AM.

## 2022-10-15 NOTE — PROGRESS NOTES
"[facility]  Hospitalist Progress Note  Haylee Goodman MD 10/15/22    Reason for Stay (Diagnosis): pre syncope, sepsis         Assessment and Plan:      Summary of Stay: Ms. Turner is a 77-year-old female with a history of hypertension and psoriasis who was admitted on 10/13/2022 after a fall/near syncopal event.  Patient developed fever (103.2) overnight, now on Zosyn for sepsis.    Problem List/Assessment and Plan:     Generalized weakness, fall, near syncope vs syncope: She began to feel weak like her knees were going to give out.  She attempted to find something to sit on, but was unable to and so she fell to the floor.She thinks she could have passed out but doesn't recall all details. She went home and then apparently had another similar event at home. Patient was hypertensive upon arrival, sodium 134 and chloride of 97, both low.  Troponin within normal limits. Imaging included a brain MRI showing no acute process.  Unchanged ventriculomegaly, which was also seen on previous head CT scan. Possibility of normal pressure hydrocephalus.  No stroke. Neck and brain MRA showed mild to moderate focal stenosis of the right posterior cerebral artery.  Otherwise, no significant obstructions in the major intracranial arteries, patient was not able to ambulate without assistance of 2 people in ED so was admitted.  - TTE pending given questionable syncope   - IVF discontinued as tolerating diet  - Treat sepsis as below  - Physical therapy consult to eval function, family would like discharge to TCU    Sepsis: Patient had fever of 103.2 yesterday evening. Meets criteria for sepsis (tachycardia, fever, leukocytosis, tachypnea).  No clear infectious symptoms other than feeling slightly \"off\" on day of admission.  Had leukocytosis which resolved today (started on Zosyn yesterday evening).  UA with pyuria, possibly a source. No abdominal pain or respiratory symptoms, at this time hold on further imaging.  She " does have psoriasis but has no evidence of cellulitis.  - Continue Zosyn  - Follow up blood and urine cultures  - If bacteremic and UC negative would likely need further imaging to identify source     Hyponatremia and hypochloremia, possibly dehydration related - Improving: IVF now discontinued, repeat labs tomorrow.     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  - PCP follow up       Obesity Estimated body mass index is 34.87 kg/m  - noted     Psoriasis: Has a lesion on her hip area and would like to resume Triamcinolone cream which I ordered.    Depression: Continue Lexapro.    Diet: Regular Diet Adult    DVT Prophylaxis: Pneumatic Compression Devices  Casiano Catheter: Not present  Code Status: No CPR- Do NOT Intubate      Disposition Plan      Expected Discharge Date: 10/17/2022      Destination: home with help/services;nursing home       Entered: Haylee Goodman MD 10/15/2022, 10:24 AM Suspect discharge to TCU in 2 days, need fever to resolve and culture results back       The patient's care was discussed with the Bedside Nurse, Care Coordinator/, Patient and Patient's Family.    Hospitalist Service  [facility]          Interval History (Subjective):      Patient was seen with her daughter, DIL and  this morning as well as bedside nurse.  She is feeling better than yesterday. She denies nausea, vomiting, abdominal pain, urinary symptoms, CP, dizziness or lightheadedness.  She feels that she did too much on the day of admission which lead to her syncope. She has bene eating and drinking fine.    Discussed that she had a high fever overnight and I worry about infection. We are treating  "for possible UTI.  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 They feel that the patient needs to go to TCU and would like to speak with SW.                  Physical Exam:      Last Vital Signs:  BP (!) 189/65 (BP Location: Left arm, Cuff Size: Adult Regular)   Pulse 94   Temp 99.6  F (37.6  C) (Temporal)   Resp 16   Ht 1.575 m (5' 2.01\")   Wt 86.5 kg (190 lb 11.2 oz)   LMP  (LMP Unknown)   SpO2 95%   BMI 34.87 kg/m      General: Alert, awake, no acute distress. Sitting up in a chair  HEENT: Normocephalic and atraumatic, eyes anicteric and without scleral injection, EOMI, face symmetric, MMM.  Cardiac: RRR, normal S1, S2. II/VI WIL heard best at RUSB, no g/r, no LE edema.  Pulmonary: Normal chest rise, normal work of breathing.  Lungs CTAB without crackles or wheezing.  Abdomen: soft, non-tender, non-distended.  Normoactive bowel sounds, no guarding or rebound tenderness.  Extremities: no deformities.  Warm, well perfused.  Skin: no rashes or lesions.  Warm and Dry.  Neuro: No focal deficits.  Speech clear.  Moving all extremities in a chair  Psych: Alert and oriented x3. Appropriate affect.         Medications:      All current medications were reviewed with changes reflected in problem list.         Data:      All new lab and imaging data was reviewed.   Labs:  Recent Labs   Lab 10/15/22  0659 10/14/22  0944 10/13/22  1801    137 134*   POTASSIUM 3.6 3.9 4.1   CHLORIDE 104 103 97*   CO2 19* 22 27   ANIONGAP 14 12 10   GLC 80 92 107*   BUN 11.9 14.7 13.0   CR 0.81 0.85 0.87   GFRESTIMATED 73 69 67   MARILYN 8.3* 8.5* 9.2     Recent Labs   Lab 10/14/22  0944 10/13/22  1801   WBC 9.5 13.6*   HGB 10.7* 12.5   HCT 33.6* 39.5   MCV 92 93    230      Imaging:   No results found for this or any previous visit (from the past 24 hour(s)).    Haylee Goodman MD          "

## 2022-10-15 NOTE — PROGRESS NOTES
Care Management Follow Up    Length of Stay (days): 0    Expected Discharge Date: 10/15/2022     Concerns to be Addressed:       Patient plan of care discussed at interdisciplinary rounds: Yes    Anticipated Discharge Disposition: Skilled Nursing Facility     Anticipated Discharge Services: Transportation Services  Anticipated Discharge DME:      Patient/family educated on Medicare website which has current facility and service quality ratings:    Education Provided on the Discharge Plan:    Patient/Family in Agreement with the Plan: yes    Referrals Placed by CM/SW: Post Acute Facilities  Private pay costs discussed: private room/amenity fees and transportation costs    Additional Information:  CM met with pt and family the bedside as pt may need TCU at discharge. 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 Family also requesting information on respite for their dad. Resources given. TCU choices are:  1. [facility]  2. [facility]  3. [facility]  4. [facility]  5. [facility]    Pt prefers a private room. Family will provide transportation.    Karina Lewis RN, BSN CTS  Care Coordinator  [facility]   [phone]    Update 1347: [facility] has a private bed for pt tomorrow if she is medically ready. They are unsure if they would be able to hold the bed past tomorrow.

## 2022-10-15 NOTE — PLAN OF CARE
"PRIMARY DIAGNOSIS: GENERALIZED WEAKNESS    OUTPATIENT/OBSERVATION GOALS TO BE MET BEFORE DISCHARGE  1. Orthostatic performed: N/A    2. Tolerating PO medications: Yes    3. Return to near baseline physical activity: No    4. Cleared for discharge by consultants (if involved): No    Discharge Planner Nurse   Safe discharge environment identified: No  Barriers to discharge: Yes       Entered by: Georgiana Krishnamurthy RN 10/15/2022 2:45 PM    BP (!) 167/88 (BP Location: Right arm, Patient Position: Sitting, Cuff Size: Adult Regular)   Pulse 89   Temp 98.5  F (36.9  C) (Temporal)   Resp 16   Ht 1.575 m (5' 2.01\")   Wt 86.5 kg (190 lb 11.2 oz)   LMP  (LMP Unknown)   SpO2 94%   BMI 34.87 kg/m       Please review provider order for any additional goals.   Nurse to notify provider when observation goals have been met and patient is ready for discharge.    "

## 2022-10-15 NOTE — PROGRESS NOTES
Cross cover note    Notified by RN that patient had temperature up to 103.2  F and needed Tylenol and ice packs and temperature improved to 98.2.  Lactate 0.9.  Reviewed chart, patient had leukocytosis on presentation and UA suggestive of UTI.  Will start on Zosyn.    Momo Walker MD  Internal Medicine Hospitalist  Pager: [phone]

## 2022-10-15 NOTE — UTILIZATION REVIEW
[facility] Utilization Review  Admission Status; Secondary Review Determination     Admission Date: 10/13/2022  4:58 PM      Under the authority of the Utilization Management Committee, the utilization review process indicated a secondary review on the above patient.  The review outcome is based on review of the medical records, discussions with staff, and applying clinical experience noted on the date of the review.        (X)      Inpatient Status Appropriate - This patient's medical care is consistent with medical management for inpatient care and reasonable inpatient medical practice.          RATIONALE FOR DETERMINATION   Cami Turner is a 77 year old female with past medical history of hypertension, who presented to the emergency room with complaints of loss of consciousness and fall at home.  She is registered to observation for work-up of syncope, leukocytosis, generalized weakness, hyponatremia, dehydration and safe disposition.  Initial work-up was unrevealing for any source of infection or cardiac arrhythmias.  However, hospital course is complicated by fever of 103 degrees overnight, suspected to be UTI and started on IV Zosyn.  Likely explains the initial presentation with pyuria, cultures were pending.  In light of change in clinical status, failed observation cares, ongoing need for IV antibiotics, infectious work-up, PT/OT and safe disposition with anticipated length of stay more than 2 midnights, it is reasonable to advance to inpatient status.  Recommendation is communicated to the primary team (Dr. Goodman).             The definitions of Inpatient Status and Observation Status used in making the determination above are those provided in the CMS Coverage Manual, Chapter 1 and Chapter 6, section 70.4.      Sincerely,       Chad Meza MD, MS  Physician Advisor  Utilization Review-[place]    Phone: [phone]

## 2022-10-15 NOTE — PLAN OF CARE
"Pt A&Ox4. VSS on RA, except /88. Pt denying any pain at this time. Up with assist of 1 with walker and gait belt. NS running at 100ml/hr. Pt on Zosyn q6h. Family was here visiting for the afternoon, talking with SW about placement. Plan is TCU.     BP (!) 167/88 (BP Location: Right arm, Patient Position: Sitting, Cuff Size: Adult Regular)   Pulse 89   Temp 98.5  F (36.9  C) (Temporal)   Resp 16   Ht 1.575 m (5' 2.01\")   Wt 86.5 kg (190 lb 11.2 oz)   LMP  (LMP Unknown)   SpO2 94%   BMI 34.87 kg/m    "

## 2022-10-16 ENCOUNTER — APPOINTMENT (OUTPATIENT)
Dept: PHYSICAL THERAPY | Facility: CLINIC | Age: 77
DRG: 872 | End: 2022-10-16
Payer: MEDICARE

## 2022-10-16 LAB
ANION GAP SERPL CALCULATED.3IONS-SCNC: 13 MMOL/L (ref 7–15)
BACTERIA UR CULT: NORMAL
BUN SERPL-MCNC: 9.2 MG/DL (ref 8–23)
CALCIUM SERPL-MCNC: 8.5 MG/DL (ref 8.8–10.2)
CHLORIDE SERPL-SCNC: 104 MMOL/L (ref 98–107)
CREAT SERPL-MCNC: 0.78 MG/DL (ref 0.51–0.95)
DEPRECATED HCO3 PLAS-SCNC: 20 MMOL/L (ref 22–29)
ERYTHROCYTE [DISTWIDTH] IN BLOOD BY AUTOMATED COUNT: 14.5 % (ref 10–15)
GFR SERPL CREATININE-BSD FRML MDRD: 77 ML/MIN/1.73M2
GLUCOSE SERPL-MCNC: 91 MG/DL (ref 70–99)
HCT VFR BLD AUTO: 31.2 % (ref 35–47)
HGB BLD-MCNC: 9.8 G/DL (ref 11.7–15.7)
MCH RBC QN AUTO: 29.5 PG (ref 26.5–33)
MCHC RBC AUTO-ENTMCNC: 31.4 G/DL (ref 31.5–36.5)
MCV RBC AUTO: 94 FL (ref 78–100)
PLATELET # BLD AUTO: 188 10E3/UL (ref 150–450)
POTASSIUM SERPL-SCNC: 3.6 MMOL/L (ref 3.4–5.3)
RBC # BLD AUTO: 3.32 10E6/UL (ref 3.8–5.2)
SODIUM SERPL-SCNC: 137 MMOL/L (ref 136–145)
WBC # BLD AUTO: 7.3 10E3/UL (ref 4–11)

## 2022-10-16 PROCEDURE — 80048 BASIC METABOLIC PNL TOTAL CA: CPT | Performed by: INTERNAL MEDICINE

## 2022-10-16 PROCEDURE — 99232 SBSQ HOSP IP/OBS MODERATE 35: CPT | Performed by: STUDENT IN AN ORGANIZED HEALTH CARE EDUCATION/TRAINING PROGRAM

## 2022-10-16 PROCEDURE — 250N000013 HC RX MED GY IP 250 OP 250 PS 637: Performed by: STUDENT IN AN ORGANIZED HEALTH CARE EDUCATION/TRAINING PROGRAM

## 2022-10-16 PROCEDURE — 97530 THERAPEUTIC ACTIVITIES: CPT | Mod: GP

## 2022-10-16 PROCEDURE — 120N000001 HC R&B MED SURG/OB

## 2022-10-16 PROCEDURE — 85027 COMPLETE CBC AUTOMATED: CPT | Performed by: INTERNAL MEDICINE

## 2022-10-16 PROCEDURE — 250N000013 HC RX MED GY IP 250 OP 250 PS 637: Performed by: INTERNAL MEDICINE

## 2022-10-16 PROCEDURE — 250N000011 HC RX IP 250 OP 636: Performed by: STUDENT IN AN ORGANIZED HEALTH CARE EDUCATION/TRAINING PROGRAM

## 2022-10-16 PROCEDURE — 36415 COLL VENOUS BLD VENIPUNCTURE: CPT | Performed by: INTERNAL MEDICINE

## 2022-10-16 RX ORDER — NYSTATIN 100000 U/G
CREAM TOPICAL DAILY PRN
DISCHARGE
Start: 2022-10-16 | End: 2022-11-01

## 2022-10-16 RX ORDER — MOMETASONE FUROATE 1 MG/ML
SOLUTION TOPICAL DAILY PRN
Qty: 60 ML | Refills: 1 | DISCHARGE
Start: 2022-10-16 | End: 2023-07-14

## 2022-10-16 RX ORDER — HYDROCORTISONE 2.5 %
CREAM (GRAM) TOPICAL DAILY PRN
Qty: 15 G | Refills: 3 | DISCHARGE
Start: 2022-10-16 | End: 2022-10-18

## 2022-10-16 RX ORDER — ALBUTEROL SULFATE 90 UG/1
2 AEROSOL, METERED RESPIRATORY (INHALATION) EVERY 6 HOURS PRN
DISCHARGE
Start: 2022-10-16 | End: 2023-12-06

## 2022-10-16 RX ADMIN — ESCITALOPRAM OXALATE 10 MG: 10 TABLET ORAL at 08:59

## 2022-10-16 RX ADMIN — TRIAMCINOLONE ACETONIDE: 0.25 CREAM TOPICAL at 21:24

## 2022-10-16 RX ADMIN — TAZOBACTAM SODIUM AND PIPERACILLIN SODIUM 4.5 G: 500; 4 INJECTION, SOLUTION INTRAVENOUS at 09:04

## 2022-10-16 RX ADMIN — LORAZEPAM 0.5 MG: 0.5 TABLET ORAL at 21:24

## 2022-10-16 RX ADMIN — TAZOBACTAM SODIUM AND PIPERACILLIN SODIUM 4.5 G: 500; 4 INJECTION, SOLUTION INTRAVENOUS at 01:48

## 2022-10-16 RX ADMIN — Medication 1 MG: at 21:24

## 2022-10-16 RX ADMIN — THERA TABS 1 TABLET: TAB at 09:00

## 2022-10-16 RX ADMIN — TAZOBACTAM SODIUM AND PIPERACILLIN SODIUM 4.5 G: 500; 4 INJECTION, SOLUTION INTRAVENOUS at 21:24

## 2022-10-16 RX ADMIN — TRIAMCINOLONE ACETONIDE: 0.25 CREAM TOPICAL at 09:11

## 2022-10-16 RX ADMIN — LISINOPRIL 20 MG: 20 TABLET ORAL at 08:59

## 2022-10-16 RX ADMIN — Medication 1 MG: at 01:48

## 2022-10-16 RX ADMIN — TAZOBACTAM SODIUM AND PIPERACILLIN SODIUM 4.5 G: 500; 4 INJECTION, SOLUTION INTRAVENOUS at 15:47

## 2022-10-16 ASSESSMENT — ACTIVITIES OF DAILY LIVING (ADL)
ADLS_ACUITY_SCORE: 23
ADLS_ACUITY_SCORE: 36
DRESSING/BATHING_DIFFICULTY: NO
DOING_ERRANDS_INDEPENDENTLY_DIFFICULTY: NO
CHANGE_IN_FUNCTIONAL_STATUS_SINCE_ONSET_OF_CURRENT_ILLNESS/INJURY: NO
ADLS_ACUITY_SCORE: 36
DIFFICULTY_COMMUNICATING: NO
ADLS_ACUITY_SCORE: 23
ADLS_ACUITY_SCORE: 36
NUMBER_OF_TIMES_PATIENT_HAS_FALLEN_WITHIN_LAST_SIX_MONTHS: 1
FALL_HISTORY_WITHIN_LAST_SIX_MONTHS: NO
WEAR_GLASSES_OR_BLIND: NO
ADLS_ACUITY_SCORE: 23
ADLS_ACUITY_SCORE: 36
CONCENTRATING,_REMEMBERING_OR_MAKING_DECISIONS_DIFFICULTY: NO
ADLS_ACUITY_SCORE: 23
DIFFICULTY_EATING/SWALLOWING: NO
ADLS_ACUITY_SCORE: 36
TOILETING_ISSUES: NO
ADLS_ACUITY_SCORE: 36
WALKING_OR_CLIMBING_STAIRS_DIFFICULTY: NO
HEARING_DIFFICULTY_OR_DEAF: NO

## 2022-10-16 NOTE — PROGRESS NOTES
Care Management Discharge Note    Discharge Date: 10/17/2022       Discharge Disposition: Skilled Nursing Facility    Discharge Services: Transportation Services    Discharge DME:      Discharge Transportation:      Private pay costs discussed: private room/amenity fees    PAS Confirmation Code:    Patient/family educated on Medicare website which has current facility and service quality ratings:      Education Provided on the Discharge Plan:    Persons Notified of Discharge Plans: pt and daughterAnnita  Patient/Family in Agreement with the Plan: yes    Handoff Referral Completed: Yes    Additional Information:  Pt likely discharge ready tomorrow. Pt has a private room at [facility] for tomorrow. [facility] does have Covid in the building, pt is aware of this. They would like pt there by 1pm, will need SNF discharge orders by 11am at the latest. CM spoke with pt and daughterAnnita who will be providing transportation at 1pm.    Karina Lewis RN, BSN CTS  Care Coordinator  [facility]   [phone]    You have successfully submitted the preadmission screening (PAS) to the Senior LinkAge Line on:  Created On  10/16/2022 1:03 PM  Your confirmation number is:  [id number]

## 2022-10-16 NOTE — PLAN OF CARE
"Pt A&Ox4. VSS on RA, except /76. Pt denies any pain at this time. Pt up to bathroom and in halls with assist of 1 with walker and gait belt. Pt on IV zosyn. Plan is discharge tomorrow to TCU.     BP (!) 170/76 (BP Location: Right arm)   Pulse 69   Temp 98.2  F (36.8  C) (Temporal)   Resp 24   Ht 1.575 m (5' 2.01\")   Wt 86.5 kg (190 lb 11.2 oz)   LMP  (LMP Unknown)   SpO2 95%   BMI 34.87 kg/m    "

## 2022-10-16 NOTE — PROGRESS NOTES
"[facility]  Hospitalist Progress Note  Momo Walker MD 10/15/22    Reason for Stay (Diagnosis): pre syncope, sepsis         Assessment and Plan:      Summary of Stay: Ms. Turner is a 77-year-old female with a history of hypertension and psoriasis who was admitted on 10/13/2022 after a fall/near syncopal event.  Patient developed fever (103.2) overnight, now on Zosyn for sepsis.    Problem List/Assessment and Plan:     Generalized weakness, fall, near syncope vs syncope: She began to feel weak like her knees were going to give out.  She attempted to find something to sit on, but was unable to and so she fell to the floor.She thinks she could have passed out but doesn't recall all details. She went home and then apparently had another similar event at home. Patient was hypertensive upon arrival, sodium 134 and chloride of 97, both low.  Troponin within normal limits. Imaging included a brain MRI showing no acute process.  Unchanged ventriculomegaly, which was also seen on previous head CT scan. Possibility of normal pressure hydrocephalus.  No stroke. Neck and brain MRA showed mild to moderate focal stenosis of the right posterior cerebral artery.  Otherwise, no significant obstructions in the major intracranial arteries, patient was not able to ambulate without assistance of 2 people in ED so was admitted.  - TTE pending given questionable syncope   - IVF discontinued as tolerating diet  - Treat sepsis as below  - Physical therapy consult to eval function, family would like discharge to TCU    Sepsis: Patient had fever of 103.2 yesterday evening. Meets criteria for sepsis (tachycardia, fever, leukocytosis, tachypnea).  No clear infectious symptoms other than feeling slightly \"off\" on day of admission.  Had leukocytosis which resolved today (started on Zosyn yesterday evening).  UA with pyuria, possibly a source. No abdominal pain or respiratory symptoms, at this time hold on further imaging.  She does " "have psoriasis but has no evidence of cellulitis.  - Continue Zosyn  - Follow up blood blood cultures for at least 48 hours which will be tonight.  Urine culture negative till now.  - If bacteremic and UC negative would likely need further imaging to identify source     Hyponatremia and hypochloremia, possibly dehydration related - Improving: IVF now discontinued, labs normalized.     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  - PCP follow up       Obesity Estimated body mass index is 34.87 kg/m  - noted     Psoriasis: Has a lesion on her hip area and would like to resume Triamcinolone cream which I ordered.    Depression: Continue Lexapro.    Diet: Regular Diet Adult    DVT Prophylaxis: Pneumatic Compression Devices  Casiano Catheter: Not present  Code Status: No CPR- Do NOT Intubate      Disposition Plan     Expected Discharge Date: 10/17/2022      Destination: home with help/services;nursing home       Entered: Momo Walker MD 10/16/2022, 12:07 PM Suspect discharge to TCU on 10/17, need fever to resolve and culture results back       The patient's care was discussed with the Bedside Nurse, Care Coordinator/, Patient and Patient's Family.    Hospitalist Service  [facility]          Interval History (Subjective):      Patient was seen with her daughter-in-law and  this morning as well as bedside nurse.  T-max of 99.9  F.  Otherwise feels well and denies any pain.                  Physical Exam:      Last Vital Signs:  BP (!) 170/76 (BP Location: Right arm)   Pulse 69   Temp 98.2  F (36.8  C) (Temporal)   Resp 24   Ht 1.575 m (5' 2.01\")   Wt 86.5 kg (190 lb 11.2 oz)   LMP  (LMP " Unknown)   SpO2 95%   BMI 34.87 kg/m      General: Alert, awake, no acute distress. Sitting up in a chair  HEENT: Normocephalic and atraumatic, eyes anicteric and without scleral injection, EOMI, face symmetric, MMM.  Cardiac: RRR, normal S1, S2. II/VI WIL heard best at RUSB, no g/r, no LE edema.  Pulmonary: Normal chest rise, normal work of breathing.  Lungs CTAB without crackles or wheezing.  Abdomen: soft, non-tender, non-distended.  Normoactive bowel sounds, no guarding or rebound tenderness.  Extremities: no deformities.  Warm, well perfused.  Skin: no rashes or lesions.  Warm and Dry.  Neuro: No focal deficits.  Speech clear.  Moving all extremities in a chair  Psych: Alert and oriented x3. Appropriate affect.         Medications:      All current medications were reviewed with changes reflected in problem list.         Data:      All new lab and imaging data was reviewed.   Labs:  Recent Labs   Lab 10/16/22  0715 10/15/22  0659 10/14/22  0944    137 137   POTASSIUM 3.6 3.6 3.9   CHLORIDE 104 104 103   CO2 20* 19* 22   ANIONGAP 13 14 12   GLC 91 80 92   BUN 9.2 11.9 14.7   CR 0.78 0.81 0.85   GFRESTIMATED 77 73 69   MARILYN 8.5* 8.3* 8.5*     Recent Labs   Lab 10/16/22  0715 10/14/22  0944 10/13/22  1801   WBC 7.3 9.5 13.6*   HGB 9.8* 10.7* 12.5   HCT 31.2* 33.6* 39.5   MCV 94 92 93    178 230      Imaging:   Recent Results (from the past 24 hour(s))   Echocardiogram Complete   Result Value    LVEF  60-65%    Narrative    [id number]  [id number]  [id number]  [id number]^VALENTINE^MORGAN     [facility]  Echocardiography Laboratory  [address] [place] [address]     Name: SHAUNA SAMS  MRN: [id number]  : 1945  Study Date: 10/15/2022 12:26 PM  Age: 77 yrs  Gender: Female  Patient Location: [facility]  Reason For Study: Syncope and Collapse  Ordering Physician: MORGAN GRIJALVA  Referring Physician: Tisha Trevino, CNP  Performed By: Vani Martini RDCS     BSA: 1.9  m2  Height: 62 in  Weight: 190 lb  HR: 78  BP: 98/29 mmHg  ______________________________________________________________________________  Procedure  Complete Echo Adult.  ______________________________________________________________________________  Interpretation Summary     Left ventricular systolic function is normal.  The visual ejection fraction is 60-65%.  No regional wall motion abnormalities noted.  There is mild mitral stenosis.  The mean mitral valve gradient is 7mmHg.(75 bpm)  Mild valvular aortic stenosis.  The mean AoV pressure gradient is 15mmHg.  The study was technically difficult. There is no comparison study available.  ______________________________________________________________________________  Left Ventricle  The left ventricle is normal in size. There is normal left ventricular wall  thickness. Left ventricular systolic function is normal. The visual ejection  fraction is 60-65%. Grade II or moderate diastolic dysfunction. No regional  wall motion abnormalities noted.     Right Ventricle  The right ventricle is normal size. The right ventricular systolic function is  normal.     Atria  Normal left atrial size. Right atrial size is normal.     Mitral Valve  There is moderate mitral annular calcification. There is trace mitral  regurgitation. The mean mitral valve gradient is 7mmHg. (75 bpm). There is  mild mitral stenosis. Calcified mitral apparatus causing mitral stenosis.     Tricuspid Valve  There is mild (1+) tricuspid regurgitation. The right ventricular systolic  pressure is approximated at 35.4 mmHg plus the right atrial pressure. Right  ventricular systolic pressure is elevated, consistent with mild to moderate  pulmonary hypertension. IVC diameter and respiratory changes fall into an  intermediate range suggesting an RA pressure of 8 mmHg.     Aortic Valve  There is moderate trileaflet aortic sclerosis. Mild valvular aortic stenosis.  The mean AoV pressure gradient is 15mmHg.      Pulmonic Valve  The pulmonic valve is not well visualized.     Vessels  The aortic root is normal size.     Pericardium  There is no pericardial effusion.     Rhythm  Sinus rhythm was noted.  ______________________________________________________________________________  MMode/2D Measurements & Calculations  IVSd: 1.0 cm     LVIDd: 3.5 cm  LVIDs: 2.1 cm  LVPWd: 0.93 cm  FS: 40.8 %  LV mass(C)d: 100.8 grams  LV mass(C)dI: 53.9 grams/m2  Ao root diam: 3.1 cm  LA dimension: 3.9 cm  asc Aorta Diam: 3.1 cm  LA/Ao: 1.3  LVOT diam: 2.0 cm  LVOT area: 3.3 cm2  LA Volume (BP): 40.2 ml  LA Volume Index (BP): 21.5 ml/m2  RWT: 0.53     Doppler Measurements & Calculations  MV E max cameron: 154.9 cm/sec  MV A max cameron: 163.0 cm/sec  MV E/A: 0.95  MV max P.3 mmHg  MV mean P.9 mmHg  MV V2 VTI: 60.5 cm  MVA(VTI): 1.7 cm2  MV P1/2t max cameron: 181.6 cm/sec  MV P1/2t: 105.1 msec  MVA(P1/2t): 2.1 cm2  MV dec slope: 505.8 cm/sec2  MV dec time: 0.26 sec  Ao V2 max: 258.0 cm/sec  Ao max P.0 mmHg  Ao V2 mean: 179.1 cm/sec  Ao mean PG: 15.0 mmHg  Ao V2 VTI: 53.0 cm  SANG(I,D): 1.9 cm2  SANG(V,D): 1.7 cm2  LV V1 max P.1 mmHg  LV V1 max: 133.7 cm/sec  LV V1 VTI: 31.3 cm  SV(LVOT): 103.1 ml  SI(LVOT): 55.1 ml/m2  TR max cameron: 297.7 cm/sec  TR max P.4 mmHg  AV Cameron Ratio (DI): 0.52  SANG Index (cm2/m2): 1.0  E/E' av.2  Lateral E/e': 21.3  Medial E/e': 17.2     ______________________________________________________________________________  Report approved by: Kami Jonas 10/15/2022 01:55 PM             Momo Walker

## 2022-10-16 NOTE — PROGRESS NOTES
"[facility]  Hospitalist Progress Note  Momo Walker MD 10/15/22    Reason for Stay (Diagnosis): pre syncope, sepsis         Assessment and Plan:      Summary of Stay: Ms. Turner is a 77-year-old female with a history of hypertension and psoriasis who was admitted on 10/13/2022 after a fall/near syncopal event.  Patient developed fever (103.2) overnight, now on Zosyn for sepsis.    Problem List/Assessment and Plan:     Generalized weakness, fall, near syncope vs syncope: She began to feel weak like her knees were going to give out.  She attempted to find something to sit on, but was unable to and so she fell to the floor.She thinks she could have passed out but doesn't recall all details. She went home and then apparently had another similar event at home. Patient was hypertensive upon arrival, sodium 134 and chloride of 97, both low.  Troponin within normal limits. Imaging included a brain MRI showing no acute process.  Unchanged ventriculomegaly, which was also seen on previous head CT scan. Possibility of normal pressure hydrocephalus.  No stroke. Neck and brain MRA showed mild to moderate focal stenosis of the right posterior cerebral artery.  Otherwise, no significant obstructions in the major intracranial arteries, patient was not able to ambulate without assistance of 2 people in ED so was admitted.  - TTE pending given questionable syncope   - IVF discontinued as tolerating diet  - Treat sepsis as below  - Physical therapy consult to eval function, family would like discharge to TCU    Sepsis: Patient had fever of 103.2 on evening of 10/14. Meets criteria for sepsis (tachycardia, fever, leukocytosis, tachypnea).  No clear infectious symptoms other than feeling slightly \"off\" on day of admission.  Had leukocytosis which resolved (started on Zosyn on 10/14).  UA with pyuria, possibly a source. No abdominal pain or respiratory symptoms, at this time hold on further imaging.  She does have psoriasis " but has no evidence of cellulitis.  - Continue Zosyn, if cultures remain negative, may discharge on Augmentin to finish 1 week of antibiotics.  - Follow up blood and urine cultures  - If bacteremic and UC negative would likely need further imaging to identify source     Hyponatremia and hypochloremia, possibly dehydration related - Improving: IVF now discontinued, repeat labs tomorrow.     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  - PCP follow up       Obesity Estimated body mass index is 34.87 kg/m  - noted     Psoriasis: Has a lesion on her hip area and would like to resume Triamcinolone cream which I ordered.    Depression: Continue Lexapro.    Diet: Regular Diet Adult    DVT Prophylaxis: Pneumatic Compression Devices  Casiano Catheter: Not present  Code Status: No CPR- Do NOT Intubate      Disposition Plan     Expected Discharge Date: 10/17/2022      Destination: home with help/services;nursing home       Entered: Momo Walker MD 10/16/2022, 12:13 PM Suspect discharge to TCU in 2 days, need fever to resolve and culture results back       The patient's care was discussed with the Bedside Nurse, Care Coordinator/, Patient and Patient's Family.    Hospitalist Service  [facility]          Interval History (Subjective):      Patient was seen with her daughter, DIL and  this morning as well as bedside nurse.  She is feeling better than yesterday. She denies nausea, vomiting, abdominal pain, urinary symptoms, CP, dizziness or lightheadedness.  She feels that she did too much on the day of admission which lead to her syncope. She has bene eating and drinking fine.    Discussed that she had a  "high fever overnight and I worry about infection. We are treating for possible UTI.  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 They feel that the patient needs to go to TCU and would like to speak with SW.                  Physical Exam:      Last Vital Signs:  BP (!) 170/76 (BP Location: Right arm)   Pulse 69   Temp 98.2  F (36.8  C) (Temporal)   Resp 24   Ht 1.575 m (5' 2.01\")   Wt 86.5 kg (190 lb 11.2 oz)   LMP  (LMP Unknown)   SpO2 95%   BMI 34.87 kg/m      General: Alert, awake, no acute distress. Sitting up in a chair  HEENT: Normocephalic and atraumatic, eyes anicteric and without scleral injection, EOMI, face symmetric, MMM.  Cardiac: RRR, normal S1, S2. II/VI WIL heard best at RUSB, no g/r, no LE edema.  Pulmonary: Normal chest rise, normal work of breathing.  Lungs CTAB without crackles or wheezing.  Abdomen: soft, non-tender, non-distended.  Normoactive bowel sounds, no guarding or rebound tenderness.  Extremities: no deformities.  Warm, well perfused.  Skin: no rashes or lesions.  Warm and Dry.  Neuro: No focal deficits.  Speech clear.  Moving all extremities in a chair  Psych: Alert and oriented x3. Appropriate affect.         Medications:      All current medications were reviewed with changes reflected in problem list.         Data:      All new lab and imaging data was reviewed.   Labs:  Recent Labs   Lab 10/16/22  0715 10/15/22  0659 10/14/22  0944    137 137   POTASSIUM 3.6 3.6 3.9   CHLORIDE 104 104 103   CO2 20* 19* 22   ANIONGAP 13 14 12   GLC 91 80 92   BUN 9.2 11.9 14.7   CR 0.78 0.81 0.85   GFRESTIMATED 77 73 69   MARILYN 8.5* 8.3* 8.5*     Recent Labs   Lab 10/16/22  0715 10/14/22  0944 10/13/22  1801   WBC 7.3 9.5 13.6*   HGB 9.8* 10.7* 12.5   HCT 31.2* 33.6* 39.5   MCV 94 92 93    178 230      Imaging:   Recent Results (from the past 24 hour(s))   Echocardiogram Complete   Result Value    LVEF  60-65%    Narrative    " [id number]  [id number]  [id number]  [id number]^VALENTINE^MORGAN     [facility]  Echocardiography Laboratory  [address] [place] [address]     Name: SHAUNA SAMS  MRN: [id number]  : 1945  Study Date: 10/15/2022 12:26 PM  Age: 77 yrs  Gender: Female  Patient Location: [facility]  Reason For Study: Syncope and Collapse  Ordering Physician: MORGAN GRIJALVA  Referring Physician: Tisha Trevino, CNP  Performed By: Vani Martini CHANEL     BSA: 1.9 m2  Height: 62 in  Weight: 190 lb  HR: 78  BP: 98/29 mmHg  ______________________________________________________________________________  Procedure  Complete Echo Adult.  ______________________________________________________________________________  Interpretation Summary     Left ventricular systolic function is normal.  The visual ejection fraction is 60-65%.  No regional wall motion abnormalities noted.  There is mild mitral stenosis.  The mean mitral valve gradient is 7mmHg.(75 bpm)  Mild valvular aortic stenosis.  The mean AoV pressure gradient is 15mmHg.  The study was technically difficult. There is no comparison study available.  ______________________________________________________________________________  Left Ventricle  The left ventricle is normal in size. There is normal left ventricular wall  thickness. Left ventricular systolic function is normal. The visual ejection  fraction is 60-65%. Grade II or moderate diastolic dysfunction. No regional  wall motion abnormalities noted.     Right Ventricle  The right ventricle is normal size. The right ventricular systolic function is  normal.     Atria  Normal left atrial size. Right atrial size is normal.     Mitral Valve  There is moderate mitral annular calcification. There is trace mitral  regurgitation. The mean mitral valve gradient is 7mmHg. (75 bpm). There is  mild mitral stenosis. Calcified mitral apparatus causing mitral stenosis.     Tricuspid Valve  There is mild (1+)  tricuspid regurgitation. The right ventricular systolic  pressure is approximated at 35.4 mmHg plus the right atrial pressure. Right  ventricular systolic pressure is elevated, consistent with mild to moderate  pulmonary hypertension. IVC diameter and respiratory changes fall into an  intermediate range suggesting an RA pressure of 8 mmHg.     Aortic Valve  There is moderate trileaflet aortic sclerosis. Mild valvular aortic stenosis.  The mean AoV pressure gradient is 15mmHg.     Pulmonic Valve  The pulmonic valve is not well visualized.     Vessels  The aortic root is normal size.     Pericardium  There is no pericardial effusion.     Rhythm  Sinus rhythm was noted.  ______________________________________________________________________________  MMode/2D Measurements & Calculations  IVSd: 1.0 cm     LVIDd: 3.5 cm  LVIDs: 2.1 cm  LVPWd: 0.93 cm  FS: 40.8 %  LV mass(C)d: 100.8 grams  LV mass(C)dI: 53.9 grams/m2  Ao root diam: 3.1 cm  LA dimension: 3.9 cm  asc Aorta Diam: 3.1 cm  LA/Ao: 1.3  LVOT diam: 2.0 cm  LVOT area: 3.3 cm2  LA Volume (BP): 40.2 ml  LA Volume Index (BP): 21.5 ml/m2  RWT: 0.53     Doppler Measurements & Calculations  MV E max cameron: 154.9 cm/sec  MV A max cameron: 163.0 cm/sec  MV E/A: 0.95  MV max P.3 mmHg  MV mean P.9 mmHg  MV V2 VTI: 60.5 cm  MVA(VTI): 1.7 cm2  MV P1/2t max cameron: 181.6 cm/sec  MV P1/2t: 105.1 msec  MVA(P1/2t): 2.1 cm2  MV dec slope: 505.8 cm/sec2  MV dec time: 0.26 sec  Ao V2 max: 258.0 cm/sec  Ao max P.0 mmHg  Ao V2 mean: 179.1 cm/sec  Ao mean PG: 15.0 mmHg  Ao V2 VTI: 53.0 cm  SANG(I,D): 1.9 cm2  SANG(V,D): 1.7 cm2  LV V1 max P.1 mmHg  LV V1 max: 133.7 cm/sec  LV V1 VTI: 31.3 cm  SV(LVOT): 103.1 ml  SI(LVOT): 55.1 ml/m2  TR max cameron: 297.7 cm/sec  TR max P.4 mmHg  AV Cameron Ratio (DI): 0.52  SANG Index (cm2/m2): 1.0  E/E' av.2  Lateral E/e': 21.3  Medial E/e': 17.2     ______________________________________________________________________________  Report  approved by: Kami Jonas 10/15/2022 01:55 PM             Haylee Goodman MD

## 2022-10-16 NOTE — PLAN OF CARE
Pt A/O x4.  VSS and afebrile. Up to bathroom with Ax1 with walker and gait belt. Expiratory wheezes - inhaler given. On Abx zosyn. No IV access, IV Leaking - Flyer paged for new IV. Discharge toTCU. Will continue to monitor.

## 2022-10-17 ENCOUNTER — APPOINTMENT (OUTPATIENT)
Dept: PHYSICAL THERAPY | Facility: CLINIC | Age: 77
DRG: 872 | End: 2022-10-17
Payer: MEDICARE

## 2022-10-17 ENCOUNTER — LAB REQUISITION (OUTPATIENT)
Dept: LAB | Facility: CLINIC | Age: 77
End: 2022-10-17
Payer: MEDICARE

## 2022-10-17 VITALS
TEMPERATURE: 97.6 F | BODY MASS INDEX: 35.09 KG/M2 | DIASTOLIC BLOOD PRESSURE: 84 MMHG | RESPIRATION RATE: 18 BRPM | HEART RATE: 69 BPM | WEIGHT: 190.7 LBS | SYSTOLIC BLOOD PRESSURE: 160 MMHG | HEIGHT: 62 IN | OXYGEN SATURATION: 97 %

## 2022-10-17 DIAGNOSIS — Z11.1 ENCOUNTER FOR SCREENING FOR RESPIRATORY TUBERCULOSIS: ICD-10-CM

## 2022-10-17 PROCEDURE — 250N000013 HC RX MED GY IP 250 OP 250 PS 637: Performed by: STUDENT IN AN ORGANIZED HEALTH CARE EDUCATION/TRAINING PROGRAM

## 2022-10-17 PROCEDURE — 250N000013 HC RX MED GY IP 250 OP 250 PS 637: Performed by: HOSPITALIST

## 2022-10-17 PROCEDURE — 250N000011 HC RX IP 250 OP 636: Performed by: STUDENT IN AN ORGANIZED HEALTH CARE EDUCATION/TRAINING PROGRAM

## 2022-10-17 PROCEDURE — 250N000013 HC RX MED GY IP 250 OP 250 PS 637: Performed by: INTERNAL MEDICINE

## 2022-10-17 PROCEDURE — 97530 THERAPEUTIC ACTIVITIES: CPT | Mod: GP | Performed by: PHYSICAL THERAPIST

## 2022-10-17 PROCEDURE — 99239 HOSP IP/OBS DSCHRG MGMT >30: CPT | Performed by: HOSPITALIST

## 2022-10-17 RX ORDER — LORAZEPAM 0.5 MG/1
0.5 TABLET ORAL DAILY PRN
Qty: 4 TABLET | Refills: 0 | Status: SHIPPED | OUTPATIENT
Start: 2022-10-17 | End: 2022-10-25

## 2022-10-17 RX ORDER — ASPIRIN 81 MG/1
81 TABLET ORAL DAILY
Status: DISCONTINUED | OUTPATIENT
Start: 2022-10-17 | End: 2022-10-17 | Stop reason: HOSPADM

## 2022-10-17 RX ADMIN — TAZOBACTAM SODIUM AND PIPERACILLIN SODIUM 4.5 G: 500; 4 INJECTION, SOLUTION INTRAVENOUS at 14:23

## 2022-10-17 RX ADMIN — TAZOBACTAM SODIUM AND PIPERACILLIN SODIUM 4.5 G: 500; 4 INJECTION, SOLUTION INTRAVENOUS at 04:05

## 2022-10-17 RX ADMIN — ALBUTEROL SULFATE 2 PUFF: 90 AEROSOL, METERED RESPIRATORY (INHALATION) at 08:58

## 2022-10-17 RX ADMIN — THERA TABS 1 TABLET: TAB at 08:45

## 2022-10-17 RX ADMIN — TRIAMCINOLONE ACETONIDE: 0.25 CREAM TOPICAL at 08:46

## 2022-10-17 RX ADMIN — LISINOPRIL 20 MG: 20 TABLET ORAL at 08:45

## 2022-10-17 RX ADMIN — ASPIRIN 81 MG: 81 TABLET, COATED ORAL at 09:54

## 2022-10-17 RX ADMIN — ESCITALOPRAM OXALATE 10 MG: 10 TABLET ORAL at 08:45

## 2022-10-17 RX ADMIN — TAZOBACTAM SODIUM AND PIPERACILLIN SODIUM 4.5 G: 500; 4 INJECTION, SOLUTION INTRAVENOUS at 08:45

## 2022-10-17 ASSESSMENT — ACTIVITIES OF DAILY LIVING (ADL)
ADLS_ACUITY_SCORE: 23

## 2022-10-17 NOTE — PROGRESS NOTES
Care Management Discharge Note    Discharge Date: 10/17/2022       Discharge Disposition: Skilled Nursing Facility    Discharge Services: Transportation Services    Discharge DME:      Discharge Transportation:      Private pay costs discussed: Not applicable    PAS Confirmation Code: [id number]  Patient/family educated on Medicare website which has current facility and service quality ratings:      Education Provided on the Discharge Plan:    Persons Notified of Discharge Plans: pt/daughter, facility, MD  Patient/Family in Agreement with the Plan: yes    Handoff Referral Completed: No    Additional Information:    Pt will discharge to [facility] at 1300 today with daughter providing transport. Orders in basket. PAS completed. Left  for facility to alert of orders.     Addendum    Updated by [facility] that they no longer have a bed available today and could accept pt tomorrow 10/18. Updated family at bedside who explained that they would prefer a morning discharge tomorrow if possible.    Left  for [facility] inquiring about early discharge.     Addendum    Updated by [facility] that they were able to move things around to accept pt at 1600. Met with family who can provide transport at 1600. [facility] has orders and everything that they need. Pt will discharge to [facility] at 1600.     KRYSTAL Kaur, SW  Inpatient Care Coordination  Ortho/Spine Unit  [phone]  Isaura Latham, SW

## 2022-10-17 NOTE — PLAN OF CARE
Pt is alert and oriented x4, assist of for transfers. No pain. Zosyn given per order. Pt requested Ativan and Melatonin before bed. Slept well. Cream applied to L groin/hip area, per pt redness is getting better. Pt is voiding adequate amounts. Plan to discharge to TCU today at 1 pm.

## 2022-10-17 NOTE — DISCHARGE SUMMARY
[facility]  Hospitalist Discharge Summary      Date of Admission:  10/13/2022  Date of Discharge:  10/17/2022  Discharging Provider: Kole Miller DO  Discharge Service: Hospitalist Service    Discharge Diagnoses   Generalized weakness, falls  Possible sepsis  Hyponatremia  ventriculomegally and focal stenosis R posterior cerebral artery, asymptomatic  Hx HTN, obesity, psoriasis, depression    Follow-ups Needed After Discharge   Follow-up Appointments     Follow Up and recommended labs and tests      Follow up with correction physician.  The following labs/tests are   recommended: CBC and BMP in 1 week.        Unresulted Labs Ordered in the Past 30 Days of this Admission     Date and Time Order Name Status Description    10/14/2022  9:28 PM Blood Culture Arm, Left Preliminary     10/14/2022  9:28 PM Blood Culture Arm, Right Preliminary       These results will be followed up by PCP    Discharge Disposition   Discharge to   Condition at discharge: Stable    Hospital Course    Summary of Stay: Ms. Turner is a 77-year-old female with a history of hypertension and psoriasis who was admitted on 10/13/2022 after a fall/near syncopal event.  Patient developed fever (103.2) overnight, now on Zosyn for possible sepsis.     Generalized weakness, fall, near syncope vs syncope  -She began to feel weak like her knees were going to give out.  She attempted to find something to sit on, but was unable to and so she fell to the floor.She thinks she could have passed out but doesn't recall all details. She went home and then apparently had another similar event at home. Patient was hypertensive upon arrival, sodium 134 and chloride of 97, both low.  Troponin within normal limits. Imaging included a brain MRI showing no acute process.  Unchanged ventriculomegaly, which was also seen on previous head CT scan. Possibility of normal pressure hydrocephalus.  No stroke. Neck and brain MRA showed mild to  "moderate focal stenosis of the right posterior cerebral artery.  Otherwise, no significant obstructions in the major intracranial arteries, patient was not able to ambulate without assistance of 2 people in ED so was admitted.  - TTE was unremarkable, EF 60-65%  - IVF discontinued as tolerating diet  - seen by PT and will discharge to TCU today     Possible sepsis  -Patient had fever of 103.2 on evening of 10/14. Meets criteria for sepsis (tachycardia, fever, leukocytosis, tachypnea).  No clear infectious symptoms other than feeling slightly \"off\" on day of admission.  Had leukocytosis which resolved (started on Zosyn on 10/14).  UA with pyuria, but urine and blood cultures negative.  She does have psoriasis but has no evidence of cellulitis.  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     Hypertension history: Continue PTA Lisinopril      Obesity Estimated body mass index is 34.87 kg/m  - noted      Psoriasis: Has a lesion on her hip area and would like to resume Triamcinolone cream which I ordered.     Depression: Continue Lexapro.    Consultations This Hospital Stay   PHYSICAL THERAPY ADULT IP CONSULT  SOCIAL WORK IP CONSULT  PHYSICAL THERAPY ADULT IP " CONSULT  OCCUPATIONAL THERAPY ADULT IP CONSULT    Code Status   No CPR- Do NOT Intubate    Time Spent on this Encounter   I, Kole Miller DO, personally saw the patient today and spent greater than 30 minutes discharging this patient.       Kole Miller DO  [facility]  [address]  [facility] [address]  Phone: [phone]  Fax: [phone]  ______________________________________________________________________    Physical Exam   Vital Signs: Temp: 98.4  F (36.9  C) Temp src: Temporal BP: (!) 183/85 Pulse: 74   Resp: 18 SpO2: 95 % O2 Device: None (Room air)    Weight: 190 lbs 11.2 oz  Face to face completed day of discharge       Primary Care Physician   Tisha Trevino    Discharge Orders      Primary Care - Care Coordination Referral      Adult Neurology  Referral      General info for SNF    Length of Stay Estimate: Short Term Care: Estimated # of Days <30  Condition at Discharge: Improving  Level of care:skilled   Rehabilitation Potential: Good  Admission H&P remains valid and up-to-date: Yes  Recent Chemotherapy: N/A  Use Nursing Home Standing Orders: Yes     Mantoux instructions    Give two-step Mantoux (PPD) Per Facility Policy Yes     Follow Up and recommended labs and tests    Follow up with [facility] physician.  The following labs/tests are recommended: CBC and BMP in 1 week.     Reason for your hospital stay    Generalized weakness, fever.  Likely sepsis due to UTI.     Activity - Up with nursing assistance     Physical Therapy Adult Consult    Evaluate and treat as clinically indicated.    Reason: Rehab after hospitalization.     Occupational Therapy Adult Consult    Evaluate and treat as clinically indicated.    Reason:  Rehab after hospitalization.     Fall precautions     Diet    Follow this diet upon discharge: Orders Placed This Encounter      Regular Diet Adult       Significant Results and Procedures   Most Recent 3 CBC's:Recent Labs   Lab  Test 10/16/22  0715 10/14/22  0944 10/13/22  1801   WBC 7.3 9.5 13.6*   HGB 9.8* 10.7* 12.5   MCV 94 92 93    178 230     Most Recent 3 BMP's:Recent Labs   Lab Test 10/16/22  0715 10/15/22  0659 10/14/22  0944    137 137   POTASSIUM 3.6 3.6 3.9   CHLORIDE 104 104 103   CO2 20* 19* 22   BUN 9.2 11.9 14.7   CR 0.78 0.81 0.85   ANIONGAP 13 14 12   MARILYN 8.5* 8.3* 8.5*   GLC 91 80 92     Most Recent 2 LFT's:Recent Labs   Lab Test 07/06/20  1051   AST 18   ALT 23   ALKPHOS 92   BILITOTAL 0.3     Most Recent 3 INR's:Recent Labs   Lab Test 10/13/22  1801   INR 1.10   ,   Results for orders placed or performed during the hospital encounter of 10/13/22   MR Brain w/o & w Contrast    Narrative    EXAM: MR BRAIN W/O and W CONTRAST  LOCATION: [facility]  DATE/TIME: 10/13/2022 7:42 PM    INDICATION: Weakness, multiple falls, difficulty following commands to puff out cheeks; concern for stroke.  COMPARISON: CT head and CTA head and neck 6/5/2021.  CONTRAST: 10 mL Gadavist  TECHNIQUE: Routine multiplanar multisequence head MRI without and with intravenous contrast.    FINDINGS:  INTRACRANIAL CONTENTS: No abnormal intracranial restricted diffusion identified to suggest recent infarct. As before, there is mild to moderate/moderate supratentorial ventriculomegaly disproportionate to the degree of sulcal prominence. This appears   similar to prior. There is relative crowding of the sulci at the frontoparietal vertex. There is a somewhat narrowed callosal angle. There is mild prominence of the sylvian fissures, suggesting mild perisylvian parenchymal volume loss. Mild generalized   cerebellar volume loss. This is unchanged. Mild patchy nonspecific T2/FLAIR hyperintense signal abnormalities in the cerebral white matter, likely due to chronic small vessel ischemic change. No acute intracranial hemorrhage, extra axial fluid   collection, mass lesion, or mass effect/herniation. No abnormal intracranial  postcontrast enhancement is identified. The major arterial flow-voids of the skull base are grossly maintained.    SELLA: No abnormality accounting for technique.    ORBITS: Prior bilateral cataract surgery. Visualized portions of the orbits are otherwise unremarkable.     SINUSES/MASTOIDS: As before, complete opacification of the right maxillary sinus with internal diffusion-restricting material, likely representing complex/inspissated or purulent secretions. Mild to moderate mucosal thickening noted in the ethmoid   sinuses. Minimal fluid in the mastoid tips bilaterally.     OTHER: There is patchy T2 hyperintense signal and enhancement involving the right mandibular condyle, which is nonspecific. This may be reactive to degenerative changes. Otherwise unremarkable bone marrow signal.      Impression    IMPRESSION:  1.  No acute intracranial process identified.  2.  Unchanged ventriculomegaly disproportionate to the degree of sulcal prominence and other findings that raise concern for the possibility of chronic communicating/normal pressure hydrocephalus. Recommend clinical correlation. The degree of   ventriculomegaly is unchanged from the prior CT.  3.  Brain parenchymal volume loss and presumed chronic small vessel ischemic changes, as described.  4.  Patchy nonspecific T2 hyperintense marrow signal and enhancement involving the right mandibular condyle in the region of the right temporomandibular joint. This may be reactive to degenerative arthropathy. Recommend clinical correlation.   MRA Brain ([place] [facility] [place]) wo Contrast    Narrative    EXAM: MRA BRAIN ([place] [facility] [place]) W/O CONTRAST  LOCATION: [facility]  DATE/TIME: 10/13/2022 7:41 PM    INDICATION: Weakness, multiple falls, difficulty following commands to puff out cheeks; concern for stroke.  COMPARISON: CT angiogram of the head and neck dated 6/5/2021.  TECHNIQUE: 3D time-of-flight head MRA without intravenous  contrast.    FINDINGS: The intracranial internal carotid arteries are patent without definite flow-limiting stenosis. The major proximal branches of the anterior cerebral and middle cerebral arteries are patent, without flow-limiting stenosis. Dominant left and   developmentally smaller right vertebral arteries are visualized. The right vertebral artery appears to terminate in the right posteroinferior cerebellar artery, as seen on the prior CTA. The left vertebral artery supplies the basilar artery, and appears   patent. The basilar artery is patent. The proximal major branches of the posterior cerebral arteries appear patent. There appears to be up to moderate focal stenosis of the distal P1 segment of the right posterior cerebral artery near the P1-P2 junction   (series 1037 image 1) new compared to the prior CTA exam. No significant stenosis of the proximal aspects of the left posterior cerebral artery noted. No intracranial aneurysm or high-flow vascular malformation identified.      Impression    IMPRESSION:  1. New mild to moderate focal stenosis of the distal P1 segment of the right posterior cerebral artery.  2. Otherwise, no significant proximal arterial stenosis or large vessel occlusion identified involving the major intracranial arteries.  3. Developmentally small right vertebral artery appears to terminate in the posteroinferior cerebellar artery, a normal variant. This is unchanged from prior.   MRA Neck (Carotids) wo & w Contrast    Narrative    EXAM: MRA NECK (CAROTIDS) W/O and W CONTRAST  LOCATION: [facility]  DATE/TIME: 10/13/2022 7:42 PM    INDICATION: Weakness, multiple falls, difficulty following commands to puff out cheeks; concern for stroke.  COMPARISON: CT angiogram head and neck 6/5/2021.  CONTRAST: 10 mL Gadavist  TECHNIQUE: Neck MRA without and with IV contrast. Stenosis measurements made according to NASCET criteria unless otherwise specified.    FINDINGS: There  is significant motion-related artifact on the axial T1 fat-suppressed sequence, limiting evaluation for the possibility of intramural hematoma.    RIGHT CAROTID: No measurable stenosis or dissection.    LEFT CAROTID: No measurable stenosis or dissection.    VERTEBRAL ARTERIES: No focal stenosis or dissection. Dominant left and smaller right vertebral arteries.    AORTIC ARCH: Bovine origin left common carotid artery. No stenosis of the origin of the brachiocephalic artery or the left subclavian artery from the aortic arch. There is again severe atherosclerotic stenosis at the origin of the right subclavian   artery, as seen on prior CTA.      Impression    IMPRESSION:  1.  No significant stenosis or occlusion of the cervical carotid or vertebral arteries.  2.  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   Echocardiogram Complete     Value    LVEF  60-65%    [facility]    [id number]  [id number]  [id number]  [id number]^VALENTINE^MORGAN     [facility]  Echocardiography Laboratory  [address] [place] [address]     Name: SHAUNA SAMS  MRN: [id number]  : 1945  Study Date: 10/15/2022 12:26 PM  Age: 77 yrs  Gender: Female  Patient Location: [facility]  Reason For Study: Syncope and Collapse  Ordering Physician: MORGAN GRIJALVA  Referring Physician: Tisha Trevino CNP  Performed By: Vani Martini RDCS     BSA: 1.9 m2  Height: 62 in  Weight: 190 lb  HR: 78  BP: 98/29 mmHg  ______________________________________________________________________________  Procedure  Complete Echo Adult.  ______________________________________________________________________________  Interpretation Summary     Left ventricular systolic function is normal.  The visual ejection fraction is 60-65%.  No regional wall motion abnormalities noted.  There is mild mitral stenosis.  The mean mitral valve gradient is 7mmHg.(75 bpm)  Mild valvular aortic  stenosis.  The mean AoV pressure gradient is 15mmHg.  The study was technically difficult. There is no comparison study available.  ______________________________________________________________________________  Left Ventricle  The left ventricle is normal in size. There is normal left ventricular wall  thickness. Left ventricular systolic function is normal. The visual ejection  fraction is 60-65%. Grade II or moderate diastolic dysfunction. No regional  wall motion abnormalities noted.     Right Ventricle  The right ventricle is normal size. The right ventricular systolic function is  normal.     Atria  Normal left atrial size. Right atrial size is normal.     Mitral Valve  There is moderate mitral annular calcification. There is trace mitral  regurgitation. The mean mitral valve gradient is 7mmHg. (75 bpm). There is  mild mitral stenosis. Calcified mitral apparatus causing mitral stenosis.     Tricuspid Valve  There is mild (1+) tricuspid regurgitation. The right ventricular systolic  pressure is approximated at 35.4 mmHg plus the right atrial pressure. Right  ventricular systolic pressure is elevated, consistent with mild to moderate  pulmonary hypertension. IVC diameter and respiratory changes fall into an  intermediate range suggesting an RA pressure of 8 mmHg.     Aortic Valve  There is moderate trileaflet aortic sclerosis. Mild valvular aortic stenosis.  The mean AoV pressure gradient is 15mmHg.     Pulmonic Valve  The pulmonic valve is not well visualized.     Vessels  The aortic root is normal size.     Pericardium  There is no pericardial effusion.     Rhythm  Sinus rhythm was noted.  ______________________________________________________________________________  MMode/2D Measurements & Calculations  IVSd: 1.0 cm     LVIDd: 3.5 cm  LVIDs: 2.1 cm  LVPWd: 0.93 cm  FS: 40.8 %  LV mass(C)d: 100.8 grams  LV mass(C)dI: 53.9 grams/m2  Ao root diam: 3.1 cm  LA dimension: 3.9 cm  asc Aorta Diam: 3.1 cm  LA/Ao:  1.3  LVOT diam: 2.0 cm  LVOT area: 3.3 cm2  LA Volume (BP): 40.2 ml  LA Volume Index (BP): 21.5 ml/m2  RWT: 0.53     Doppler Measurements & Calculations  MV E max cameron: 154.9 cm/sec  MV A max cameron: 163.0 cm/sec  MV E/A: 0.95  MV max P.3 mmHg  MV mean P.9 mmHg  MV V2 VTI: 60.5 cm  MVA(VTI): 1.7 cm2  MV P1/2t max cameron: 181.6 cm/sec  MV P1/2t: 105.1 msec  MVA(P1/2t): 2.1 cm2  MV dec slope: 505.8 cm/sec2  MV dec time: 0.26 sec  Ao V2 max: 258.0 cm/sec  Ao max P.0 mmHg  Ao V2 mean: 179.1 cm/sec  Ao mean PG: 15.0 mmHg  Ao V2 VTI: 53.0 cm  SANG(I,D): 1.9 cm2  SANG(V,D): 1.7 cm2  LV V1 max P.1 mmHg  LV V1 max: 133.7 cm/sec  LV V1 VTI: 31.3 cm  SV(LVOT): 103.1 ml  SI(LVOT): 55.1 ml/m2  TR max cameron: 297.7 cm/sec  TR max P.4 mmHg  AV Cameron Ratio (DI): 0.52  SANG Index (cm2/m2): 1.0  E/E' av.2  Lateral E/e': 21.3  Medial E/e': 17.2     ______________________________________________________________________________  Report approved by: Kami Jonas 10/15/2022 01:55 PM               Discharge Medications   Current Discharge Medication List      START taking these medications    Details   amoxicillin-clavulanate (AUGMENTIN) 875-125 MG tablet Take 1 tablet by mouth 2 times daily for 4 days    Associated Diagnoses: Sepsis, due to unspecified organism, unspecified whether acute organ dysfunction present (H)      aspirin (ASA) 81 MG EC tablet Take 1 tablet (81 mg) by mouth daily for 30 days  Refills: 1    Associated Diagnoses: Asymptomatic stenosis of intracranial artery         CONTINUE these medications which have CHANGED    Details   albuterol (PROAIR HFA/PROVENTIL HFA/VENTOLIN HFA) 108 (90 Base) MCG/ACT inhaler Inhale 2 puffs into the lungs every 6 hours as needed    Comments: Pharmacy may dispense brand covered by insurance (Proair, or proventil or ventolin or generic albuterol inhaler)  Associated Diagnoses: Seasonal asthma      hydrocortisone 2.5 % cream Apply topically daily as needed for rash or itching  Apply to eyelids every day x 7-10 days then PRN only  Qty: 15 g, Refills: 3    Associated Diagnoses: Inverse psoriasis      mometasone (ELOCON) 0.1 % external solution Apply topically daily as needed (Flareups) Apply to scalp once daily (lather and let sit for 5 minutes then wash off) for 7 days and then use as needed for flare ups  Qty: 60 mL, Refills: 1    Associated Diagnoses: Seborrheic dermatitis      nystatin (MYCOSTATIN) 566545 UNIT/GM external cream Apply topically daily as needed    Associated Diagnoses: Yeast dermatitis         CONTINUE these medications which have NOT CHANGED    Details   clobetasol (TEMOVATE) 0.05 % external solution Apply to scalp BID x 1-2 weeks PRN flares  Qty: 50 mL, Refills: 3    Associated Diagnoses: Inverse psoriasis      escitalopram (LEXAPRO) 10 MG tablet Take 1 tablet (10 mg) by mouth daily  Qty: 90 tablet, Refills: 3    Associated Diagnoses: Anxiety      lisinopril (ZESTRIL) 20 MG tablet TAKE 1 TABLET EVERY DAY  Qty: 90 tablet, Refills: 3    Associated Diagnoses: Benign essential hypertension      LORazepam (ATIVAN) 0.5 MG tablet TAKE 1 TABLET DAILY AS NEEDED FOR ANXIETY  Qty: 30 tablet, Refills: 0    Associated Diagnoses: Anxiety      triamcinolone (KENALOG) 0.1 % external cream Apply to on the legs, trunk, arms, groin BID x 2-3 weeks PRN flares  Qty: 80 g, Refills: 2    Associated Diagnoses: Inverse psoriasis         STOP taking these medications       triamcinolone (KENALOG) 0.025 % cream Comments:   Reason for Stopping:             Allergies   No Known Allergies

## 2022-10-17 NOTE — PLAN OF CARE
Goal Outcome Evaluation:      Plan of Care Reviewed With: patient, family    Patient vital signs are at baseline: Yes afebrile RA hypertensive BP at times  Patient able to ambulate as they were prior to admission or with assist devices provided by therapies during their stay:  Yes SBA 1 gait belt and walker  Patient MUST void prior to discharge:  Yes  Patient able to tolerate oral intake:  Yes  Pain has adequate pain control using Oral analgesics:  Yes pt denies pain  Does patient have an identified :  Yes  and daughter at bedside  Has goal D/C date and time been discussed with patient:  Yes pt discharging to TCU at 1600 with family at 1600, Discharge packet for TCU given to pt/family. Copy of Discharge instructions given to pt. All belongings taken with pt.  Remote tele SR per tele discontinued at 1525

## 2022-10-18 ENCOUNTER — PATIENT OUTREACH (OUTPATIENT)
Dept: CARE COORDINATION | Facility: CLINIC | Age: 77
End: 2022-10-18

## 2022-10-18 ENCOUNTER — TRANSITIONAL CARE UNIT VISIT (OUTPATIENT)
Dept: GERIATRICS | Facility: CLINIC | Age: 77
End: 2022-10-18
Payer: MEDICARE

## 2022-10-18 VITALS
DIASTOLIC BLOOD PRESSURE: 79 MMHG | HEIGHT: 62 IN | HEART RATE: 81 BPM | OXYGEN SATURATION: 95 % | RESPIRATION RATE: 18 BRPM | TEMPERATURE: 98.3 F | SYSTOLIC BLOOD PRESSURE: 166 MMHG | WEIGHT: 204.6 LBS | BODY MASS INDEX: 37.65 KG/M2

## 2022-10-18 DIAGNOSIS — F32.A DEPRESSION, UNSPECIFIED DEPRESSION TYPE: ICD-10-CM

## 2022-10-18 DIAGNOSIS — M62.81 GENERALIZED MUSCLE WEAKNESS: ICD-10-CM

## 2022-10-18 DIAGNOSIS — I10 ESSENTIAL HYPERTENSION: ICD-10-CM

## 2022-10-18 DIAGNOSIS — R53.81 PHYSICAL DECONDITIONING: ICD-10-CM

## 2022-10-18 DIAGNOSIS — L40.9 PSORIASIS: ICD-10-CM

## 2022-10-18 DIAGNOSIS — G93.89 CEREBRAL VENTRICULOMEGALY: ICD-10-CM

## 2022-10-18 DIAGNOSIS — F41.9 ANXIETY: ICD-10-CM

## 2022-10-18 DIAGNOSIS — E87.1 HYPONATREMIA: ICD-10-CM

## 2022-10-18 DIAGNOSIS — R29.6 MULTIPLE FALLS: ICD-10-CM

## 2022-10-18 DIAGNOSIS — I10 BENIGN ESSENTIAL HYPERTENSION: ICD-10-CM

## 2022-10-18 DIAGNOSIS — A41.9 SEPSIS WITHOUT ACUTE ORGAN DYSFUNCTION, DUE TO UNSPECIFIED ORGANISM (H): Primary | ICD-10-CM

## 2022-10-18 DIAGNOSIS — D64.9 ACUTE ANEMIA: ICD-10-CM

## 2022-10-18 DIAGNOSIS — E66.812 CLASS 2 OBESITY WITH BODY MASS INDEX (BMI) OF 37.0 TO 37.9 IN ADULT, UNSPECIFIED OBESITY TYPE, UNSPECIFIED WHETHER SERIOUS COMORBIDITY PRESENT: ICD-10-CM

## 2022-10-18 PROCEDURE — 86481 TB AG RESPONSE T-CELL SUSP: CPT | Performed by: NURSE PRACTITIONER

## 2022-10-18 PROCEDURE — 99310 SBSQ NF CARE HIGH MDM 45: CPT | Performed by: NURSE PRACTITIONER

## 2022-10-18 PROCEDURE — 36415 COLL VENOUS BLD VENIPUNCTURE: CPT | Performed by: NURSE PRACTITIONER

## 2022-10-18 RX ORDER — LISINOPRIL 40 MG/1
TABLET ORAL
Start: 2022-10-18 | End: 2022-11-16

## 2022-10-18 NOTE — LETTER
10/18/2022        RE: Cami Turner  [address]  [facility] [address]        [facility]    PRIMARY CARE PROVIDER AND CLINIC:  Tisha Trevino, CNP, [address] [facility] [address]  Chief Complaint   Patient presents with     Hospital F/U      [facility] Medical Record Number:  [id number]  Place of Service where encounter took place:  [facility]  ([facility]) [[id number]]    Cami Turner  is a 77 year old  (1945), admitted to the above facility from  [facility]. Hospital stay 10/13/2022 through 10/17/2022..   HPI:    Past medical history significant for hypertension, psoriasis, depression.      Summary of recent hospitalization:  Patient was hospitalized at [facility] from 10/13 to 10/17/2022.  She presented to the emergency department for evaluation of fall and generalized weakness.  Patient reportedly walking at [facility] and began to feel weak like her knees were going to give out, attempted to sit, but was unable and fell to the floor.  She had another similar event at home.  Evaluation in the emergency department revealed elevated blood pressures to 190/88, afebrile.  EKG revealed normal sinus rhythm.  Laboratory evaluation significant for sodium 134, white blood cell count 13.6, troponin high-sensitivity normal.  MRI of brain showed no acute process, unchanged ventriculomegaly, which is also been present on previous head CT.  Also noted patchy nonspecific T2 hyperintense marrow signal and enhancement involving the right mandibular condyle in the region of the right temporal mandibular joint, may be reactive to degenerative arthropathy.  Neck and brain MRA showed mild to moderate focal stenosis of the right posterior cerebral artery, unchanged severe stenosis at the origin of the right subclavian artery due to atherosclerotic disease, otherwise no significant obstructions.  Patient required assisted to in the emergency department for  ambulation, so she was admitted.  She received IV hydration.  UA abnormal, but urine culture grew less than 1000 CFU/mL mixture of urogenital gayla. TTE revealed left ventricular systolic function normal, LVEF 60 to 65%, no regional wall motion abnormalities.  Mild mitral stenosis, mild tricuspid regurgitation, mild valvular aortic stenosis, mild to moderate pulmonary HTN. Patient then developed fever of 103.2 on 10/14 and met criteria for sepsis. No clear source of infection. Blood culture negative. Received zosyn and changed to augmentin at discharge to complete 7 day course. Hyponatremia resolved after IVF fluids. Started on aspirin inpatient. Discharged to TCU for physical rehabilitation and medical management.    Cami was seen today for admission visit in the TCU.  She reports ongoing weakness.  She denies fevers, chills, shortness of breath, cough, chest pain, dizziness/lightheadedness.  She denies dysuria/trouble voiding.  She tells me her appetite is good.  Her last bowel movement was this morning.  She lives with her  in a townhouse.  We discussed what to expect while in the TCU today.    CODE STATUS/ADVANCE DIRECTIVES DISCUSSION:   DNR / DNI  ALLERGIES: No Known Allergies   PAST MEDICAL HISTORY:   Past Medical History:   Diagnosis Date     Morbid obesity (H) 07/06/2020      PAST SURGICAL HISTORY:   has a past surgical history that includes hip surgery (Right) and Ankle surgery (Left).  FAMILY HISTORY: family history is not on file.  SOCIAL HISTORY:   reports that she has never smoked. She has never used smokeless tobacco. She reports current alcohol use. She reports that she does not use drugs.  Patient's living condition: lives with spouse    Post Discharge Medication Reconciliation Status:     Post Medication Reconciliation Status: Discharge medications reconciled and changed, see notes/orders         Current Outpatient Medications   Medication Sig     albuterol (PROAIR HFA/PROVENTIL HFA/VENTOLIN  "HFA) 108 (90 Base) MCG/ACT inhaler Inhale 2 puffs into the lungs every 6 hours as needed     amoxicillin-clavulanate (AUGMENTIN) 875-125 MG tablet Take 1 tablet by mouth 2 times daily for 4 days     aspirin (ASA) 81 MG EC tablet Take 1 tablet (81 mg) by mouth daily for 30 days     clobetasol (TEMOVATE) 0.05 % external solution Apply to scalp BID x 1-2 weeks PRN flares     escitalopram (LEXAPRO) 10 MG tablet Take 1 tablet (10 mg) by mouth daily     lisinopril (ZESTRIL) 40 MG tablet TAKE 1 TABLET EVERY DAY     LORazepam (ATIVAN) 0.5 MG tablet Take 1 tablet (0.5 mg) by mouth daily as needed for anxiety     mometasone (ELOCON) 0.1 % external solution Apply topically daily as needed (Flareups) Apply to scalp once daily (lather and let sit for 5 minutes then wash off) for 7 days and then use as needed for flare ups     nystatin (MYCOSTATIN) 631709 UNIT/GM external cream Apply topically daily as needed     triamcinolone (KENALOG) 0.1 % external cream Apply to on the legs, trunk, arms, groin BID x 2-3 weeks PRN flares     No current facility-administered medications for this visit.       ROS:  10 point ROS of systems including Constitutional, Eyes, Respiratory, Cardiovascular, Gastroenterology, Genitourinary, Integumentary, Musculoskeletal, Psychiatric were all negative except for pertinent positives noted in my HPI.    Vitals:  BP (!) 166/79   Pulse 81   Temp 98.3  F (36.8  C)   Resp 18   Ht 1.575 m (5' 2.01\")   Wt 92.8 kg (204 lb 9.6 oz)   LMP  (LMP Unknown)   SpO2 95%   BMI 37.41 kg/m    Exam:  GENERAL APPEARANCE:  Alert, in NAD  HEENT: normocephalic, moist mucous membranes, nose without drainage or crusting  RESP:  respiratory effort normal, no respiratory distress, Lung sounds clear, patient is on RA  CV: auscultation of heart done, rate and rhythm regular   ABDOMEN: + bowel sounds, soft, nontender, no grimacing or guarding with palpation.  M/S: no lower extremity edema  SKIN:  Inspection and palpation of skin " and subcutaneous tissue: skin warm, dry without rashes  NEURO: cranial nerves 2-12 grossly intact and at patient's baseline; moves extremities freely  PSYCH: oriented x 3, affect and mood ok      Lab/Diagnostic data:  Labs done in SNF are in [facility]. Please refer to them using EPIC/Care Everywhere. and Recent labs in [facility] reviewed by me today.     ASSESSMENT/PLAN:  (A41.9) Sepsis without acute organ dysfunction, due to unspecified organism (H)  (primary encounter diagnosis)  Comment: developed fever of 103.2 on 10/14 and met criteria for sepsis  - No clear source of infection.   -Blood culture negative.   -Received zosyn inpatient and changed to augmentin at discharge  -is without symptoms today  -afebrile and hemodynamically stable  Plan: Continue augmentin 875-125 mg BID x 4 days to complete 7 day course. Monitor for symptoms.     (M62.81) Generalized muscle weakness  (R29.6) Multiple falls  (R53.81) Physical deconditioning  Comment: etiology of weakness not clear, could be possible sepsis contributing as above  Plan: Encourage participation in physical therapy/occupational therapy for strengthening and deconditioning. Discharge planning per their recommendation. Social work to assist with d/c planning.    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 Follow up with neurology outpatient for additional evaluation.     (I10) Essential hypertension  Mild to moderate pulmonary HTN  Comment: Acute on chronic,  -Blood pressure elevated in TCU: 172/90, 149/71, 166/79, also  "looks like it was elevated during hospitalization  -increased lisinopril dose 10/18  Plan: Increase lisinopril to 40 mg daily. BMP next week. VS per policy. Adjust medications as needed.    (D64.9) Acute anemia  Comment: baseline hemoglobin around 12, trended down while inpatient  -Hemoglobin 10/16/2022  Plan: CBC 10/20.  Monitor for signs and symptoms of bleeding.    (E87.1) Hyponatremia  Comment: sodium 134 on 10/13/2022- improved with IV hydration  Plan: BMP 10/20 to ensure stability    (F32.A) Depression, unspecified depression type  (F41.9) Anxiety  Comment: chronic, mood seems ok today  Plan: Continue escitalopram 10 mg daily, ativan 0.5 mg daily PRN. Noted initial PRN orders for non antipsychotic psychotropic medications are limited to 14 days. Start new psychotropic medication ativan x 14 days for sporadic anxiety. Nsg to update [facility] provider with number of uses 5 days prior to order expiration. Monitor symptoms. ACP referral PRN.    (L40.9) Psoriasis  Comment: reportedly with rash to hip, which I did not visualize today  Plan: Discontinue hydrocortisone. Continue triamcinolone 0.1% BID x2 weeks or until rash resolves. Monitor symptoms    (E66.9,  Z68.37) Class 2 obesity with body mass index (BMI) of 37.0 to 37.9 in adult, unspecified obesity type, unspecified whether serious comorbidity present  Comment: Estimated body mass index is 37.41 kg/m  as calculated from the following:    Height as of this encounter: 1.575 m (5' 2.01\").    Weight as of this encounter: 92.8 kg (204 lb 9.6 oz).  -this is clinically significant due to increased resources needed to assist in caring for patient  Plan: Dietician following, encourage weight loss.         Total time spent with patient visit at the skilled nursing facility was 36 minutes including patient visit and review of past records. Greater than 50% of total time spent with counseling and coordinating care with facility staff regarding admission orders, medication " orders, plan of care as described above. Counseling patient about current medications, plan of care and what to expect at TCU  .       Disclaimer: This note may contain text created using speech-recognition software and may contain unintended word substitutions.         Electronically signed by:  LYNDSEY Chanel CNP                     Sincerely,        LYNDSEY Chanel CNP

## 2022-10-18 NOTE — PATIENT INSTRUCTIONS
Orders  Cami Turner  1945  1) Discontinue hydrocortisone  2) Clarify triamcinolone instructions to apply small amount to rash BID x2 weeks or until rash resolves. Diagnosis: psoriasis  3) Add 14 day end date to ativan order. Nsg to update [facility] provider with number of uses 5 days prior to order expiration.   4) CBC, BMP 10/20 Diagnosis: hyponatremia, anemia  5) Increase lisinopril to 40 mg daily. Diagnosis: HTN  LYNDSEY Chanel CNP on 10/18/2022 at 11:25 AM

## 2022-10-18 NOTE — PROGRESS NOTES
[facility]ERIATR[facility]    PRIMARY CARE PROVIDER AND CLINIC:  Tihsa Trevino, CNP, [address] [facility] [address]  Chief Complaint   Patient presents with     Hospital F/U      [facility] Medical Record Number:  [id number]  Place of Service where encounter took place:  [facility]  ([facility]) [[id number]]    Cami Turner  is a 77 year old  (1945), admitted to the above facility from  [facility]. Hospital stay 10/13/2022 through 10/17/2022..   HPI:    Past medical history significant for hypertension, psoriasis, depression.      Summary of recent hospitalization:  Patient was hospitalized at [facility] from 10/13 to 10/17/2022.  She presented to the emergency department for evaluation of fall and generalized weakness.  Patient reportedly walking at [employer] and began to feel weak like her knees were going to give out, attempted to sit, but was unable and fell to the floor.  She had another similar event at home.  Evaluation in the emergency department revealed elevated blood pressures to 190/88, afebrile.  EKG revealed normal sinus rhythm.  Laboratory evaluation significant for sodium 134, white blood cell count 13.6, troponin high-sensitivity normal.  MRI of brain showed no acute process, unchanged ventriculomegaly, which is also been present on previous head CT.  Also noted patchy nonspecific T2 hyperintense marrow signal and enhancement involving the right mandibular condyle in the region of the right temporal mandibular joint, may be reactive to degenerative arthropathy.  Neck and brain MRA showed mild to moderate focal stenosis of the right posterior cerebral artery, unchanged severe stenosis at the origin of the right subclavian artery due to atherosclerotic disease, otherwise no significant obstructions.  Patient required assisted to in the emergency department for ambulation, so she was admitted.  She received IV hydration.  UA abnormal, but urine culture  grew less than 1000 CFU/mL mixture of urogenital gayla. TTE revealed left ventricular systolic function normal, LVEF 60 to 65%, no regional wall motion abnormalities.  Mild mitral stenosis, mild tricuspid regurgitation, mild valvular aortic stenosis, mild to moderate pulmonary HTN. Patient then developed fever of 103.2 on 10/14 and met criteria for sepsis. No clear source of infection. Blood culture negative. Received zosyn and changed to augmentin at discharge to complete 7 day course. Hyponatremia resolved after IVF fluids. Started on aspirin inpatient. Discharged to TCU for physical rehabilitation and medical management.    Cami was seen today for admission visit in the TCU.  She reports ongoing weakness.  She denies fevers, chills, shortness of breath, cough, chest pain, dizziness/lightheadedness.  She denies dysuria/trouble voiding.  She tells me her appetite is good.  Her last bowel movement was this morning.  She lives with her  in a townhouse.  We discussed what to expect while in the TCU today.    CODE STATUS/ADVANCE DIRECTIVES DISCUSSION:   DNR / DNI  ALLERGIES: No Known Allergies   PAST MEDICAL HISTORY:   Past Medical History:   Diagnosis Date     Morbid obesity (H) 07/06/2020      PAST SURGICAL HISTORY:   has a past surgical history that includes hip surgery (Right) and Ankle surgery (Left).  FAMILY HISTORY: family history is not on file.  SOCIAL HISTORY:   reports that she has never smoked. She has never used smokeless tobacco. She reports current alcohol use. She reports that she does not use drugs.  Patient's living condition: lives with spouse    Post Discharge Medication Reconciliation Status:     Post Medication Reconciliation Status: Discharge medications reconciled and changed, see notes/orders         Current Outpatient Medications   Medication Sig     albuterol (PROAIR HFA/PROVENTIL HFA/VENTOLIN HFA) 108 (90 Base) MCG/ACT inhaler Inhale 2 puffs into the lungs every 6 hours as needed      "amoxicillin-clavulanate (AUGMENTIN) 875-125 MG tablet Take 1 tablet by mouth 2 times daily for 4 days     aspirin (ASA) 81 MG EC tablet Take 1 tablet (81 mg) by mouth daily for 30 days     clobetasol (TEMOVATE) 0.05 % external solution Apply to scalp BID x 1-2 weeks PRN flares     escitalopram (LEXAPRO) 10 MG tablet Take 1 tablet (10 mg) by mouth daily     lisinopril (ZESTRIL) 40 MG tablet TAKE 1 TABLET EVERY DAY     LORazepam (ATIVAN) 0.5 MG tablet Take 1 tablet (0.5 mg) by mouth daily as needed for anxiety     mometasone (ELOCON) 0.1 % external solution Apply topically daily as needed (Flareups) Apply to scalp once daily (lather and let sit for 5 minutes then wash off) for 7 days and then use as needed for flare ups     nystatin (MYCOSTATIN) 399269 UNIT/GM external cream Apply topically daily as needed     triamcinolone (KENALOG) 0.1 % external cream Apply to on the legs, trunk, arms, groin BID x 2-3 weeks PRN flares     No current facility-administered medications for this visit.       ROS:  10 point ROS of systems including Constitutional, Eyes, Respiratory, Cardiovascular, Gastroenterology, Genitourinary, Integumentary, Musculoskeletal, Psychiatric were all negative except for pertinent positives noted in my HPI.    Vitals:  BP (!) 166/79   Pulse 81   Temp 98.3  F (36.8  C)   Resp 18   Ht 1.575 m (5' 2.01\")   Wt 92.8 kg (204 lb 9.6 oz)   LMP  (LMP Unknown)   SpO2 95%   BMI 37.41 kg/m    Exam:  GENERAL APPEARANCE:  Alert, in NAD  HEENT: normocephalic, moist mucous membranes, nose without drainage or crusting  RESP:  respiratory effort normal, no respiratory distress, Lung sounds clear, patient is on RA  CV: auscultation of heart done, rate and rhythm regular   ABDOMEN: + bowel sounds, soft, nontender, no grimacing or guarding with palpation.  M/S: no lower extremity edema  SKIN:  Inspection and palpation of skin and subcutaneous tissue: skin warm, dry without rashes  NEURO: cranial nerves 2-12 grossly " intact and at patient's baseline; moves extremities freely  PSYCH: oriented x 3, affect and mood ok      Lab/Diagnostic data:  Labs done in SNF are in [place] EPIC. Please refer to them using [employer]/Care Everywhere. and Recent labs in EPIC reviewed by me today.     ASSESSMENT/PLAN:  (A41.9) Sepsis without acute organ dysfunction, due to unspecified organism (H)  (primary encounter diagnosis)  Comment: developed fever of 103.2 on 10/14 and met criteria for sepsis  - No clear source of infection.   -Blood culture negative.   -Received zosyn inpatient and changed to augmentin at discharge  -is without symptoms today  -afebrile and hemodynamically stable  Plan: Continue augmentin 875-125 mg BID x 4 days to complete 7 day course. Monitor for symptoms.     (M62.81) Generalized muscle weakness  (R29.6) Multiple falls  (R53.81) Physical deconditioning  Comment: etiology of weakness not clear, could be possible sepsis contributing as above  Plan: Encourage participation in physical therapy/occupational therapy for strengthening and deconditioning. Discharge planning per their recommendation. Social work to assist with d/c planning.    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 Follow up with neurology outpatient for additional evaluation.     (I10) Essential hypertension  Mild to moderate pulmonary HTN  Comment: Acute on chronic,  -Blood pressure elevated in TCU: 172/90, 149/71, 166/79, also looks like it was elevated during hospitalization  -increased lisinopril dose 10/18  Plan:  "Increase lisinopril to 40 mg daily. BMP next week. VS per policy. Adjust medications as needed.    (D64.9) Acute anemia  Comment: baseline hemoglobin around 12, trended down while inpatient  -Hemoglobin 10/16/2022  Plan: CBC 10/20.  Monitor for signs and symptoms of bleeding.    (E87.1) Hyponatremia  Comment: sodium 134 on 10/13/2022- improved with IV hydration  Plan: BMP 10/20 to ensure stability    (F32.A) Depression, unspecified depression type  (F41.9) Anxiety  Comment: chronic, mood seems ok today  Plan: Continue escitalopram 10 mg daily, ativan 0.5 mg daily PRN. Noted initial PRN orders for non antipsychotic psychotropic medications are limited to 14 days. Start new psychotropic medication ativan x 14 days for sporadic anxiety. Nsg to update [facility] provider with number of uses 5 days prior to order expiration. Monitor symptoms. ACP referral PRN.    (L40.9) Psoriasis  Comment: reportedly with rash to hip, which I did not visualize today  Plan: Discontinue hydrocortisone. Continue triamcinolone 0.1% BID x2 weeks or until rash resolves. Monitor symptoms    (E66.9,  Z68.37) Class 2 obesity with body mass index (BMI) of 37.0 to 37.9 in adult, unspecified obesity type, unspecified whether serious comorbidity present  Comment: Estimated body mass index is 37.41 kg/m  as calculated from the following:    Height as of this encounter: 1.575 m (5' 2.01\").    Weight as of this encounter: 92.8 kg (204 lb 9.6 oz).  -this is clinically significant due to increased resources needed to assist in caring for patient  Plan: Dietician following, encourage weight loss.         Total time spent with patient visit at the skilled nursing facility was 36 minutes including patient visit and review of past records. Greater than 50% of total time spent with counseling and coordinating care with facility staff regarding admission orders, medication orders, plan of care as described above. Counseling patient about current medications, plan of " care and what to expect at [facility]  .       Disclaimer: This note may contain text created using speech-recognition software and may contain unintended word substitutions.         Electronically signed by:  LYNDSEY Chanel CNP

## 2022-10-18 NOTE — PROGRESS NOTES
[facility]    Background: Transitional Care Management program auto-identified and prompting a chart review by [facility] team.    Assessment: Upon chart review, [facility] Team member will cancel/close this episode of Transitional Care Management program due to reason below:    Patient has discharged to a Memory Care, Nursing Home, Assisted Living or Group Home where patient is receiving on-site support with their daily cares, including support with hospital follow up plan.    Plan: Transitional Care Management episode closed per reason above.      Apryl Winston RN  [facility], [facility]    *Conne[facility] Care Resource Team does NOT follow patient ongoing. Referrals are identified based on internal discharge reports and the outreach is to ensure patient has an understanding of their discharge instructions.

## 2022-10-18 NOTE — PLAN OF CARE
"Physical Therapy Discharge Summary    Reason for therapy discharge:    Discharged to transitional care facility.    Progress towards therapy goal(s). See goals on Care Plan in [facility] electronic health record for goal details.  Goals not met.  Barriers to achieving goals:   discharge from facility.    Therapy recommendation(s):    Continued therapy is recommended.  Rationale/Recommendations:  \"Patient demonstrates improvement in mobility since yesterday, however still limited to 110 feet ambulation with CGA before requiring seated rest, unable to trial stair negotiation.   Anticipate that patient will continue to improve in functional mobility with ongoing medical management.  Recommend return to home with 24/7 assist from family members for all ambulation, ADLs and IADLs, Home PT/OT/HHA, use of walker with all mobility, shower chair for bathing.  If family is unable to provide this level of assistance, recommend TCU in order to increase strength, activity tolerance and independence with mobility\".      "

## 2022-10-19 ENCOUNTER — LAB REQUISITION (OUTPATIENT)
Dept: LAB | Facility: CLINIC | Age: 77
End: 2022-10-19
Payer: MEDICARE

## 2022-10-19 DIAGNOSIS — E87.1 HYPO-OSMOLALITY AND HYPONATREMIA: ICD-10-CM

## 2022-10-19 DIAGNOSIS — D64.9 ANEMIA, UNSPECIFIED: ICD-10-CM

## 2022-10-20 ENCOUNTER — DOCUMENTATION ONLY (OUTPATIENT)
Dept: OTHER | Facility: CLINIC | Age: 77
End: 2022-10-20

## 2022-10-20 ENCOUNTER — LAB REQUISITION (OUTPATIENT)
Dept: LAB | Facility: CLINIC | Age: 77
End: 2022-10-20
Payer: MEDICARE

## 2022-10-20 DIAGNOSIS — U07.1 COVID-19: ICD-10-CM

## 2022-10-20 LAB
ANION GAP SERPL CALCULATED.3IONS-SCNC: 9 MMOL/L (ref 7–15)
BACTERIA BLD CULT: NO GROWTH
BACTERIA BLD CULT: NO GROWTH
BUN SERPL-MCNC: 8 MG/DL (ref 8–23)
CALCIUM SERPL-MCNC: 9 MG/DL (ref 8.8–10.2)
CHLORIDE SERPL-SCNC: 103 MMOL/L (ref 98–107)
CREAT SERPL-MCNC: 0.71 MG/DL (ref 0.51–0.95)
DEPRECATED HCO3 PLAS-SCNC: 28 MMOL/L (ref 22–29)
ERYTHROCYTE [DISTWIDTH] IN BLOOD BY AUTOMATED COUNT: 14.1 % (ref 10–15)
GAMMA INTERFERON BACKGROUND BLD IA-ACNC: 0.14 IU/ML
GFR SERPL CREATININE-BSD FRML MDRD: 86 ML/MIN/1.73M2
GLUCOSE SERPL-MCNC: 115 MG/DL (ref 70–99)
HCT VFR BLD AUTO: 34.1 % (ref 35–47)
HGB BLD-MCNC: 10.5 G/DL (ref 11.7–15.7)
M TB IFN-G BLD-IMP: NEGATIVE
M TB IFN-G CD4+ BCKGRND COR BLD-ACNC: 8.86 IU/ML
MCH RBC QN AUTO: 28.6 PG (ref 26.5–33)
MCHC RBC AUTO-ENTMCNC: 30.8 G/DL (ref 31.5–36.5)
MCV RBC AUTO: 93 FL (ref 78–100)
MITOGEN IGNF BCKGRD COR BLD-ACNC: 0.01 IU/ML
MITOGEN IGNF BCKGRD COR BLD-ACNC: 0.06 IU/ML
PLATELET # BLD AUTO: 320 10E3/UL (ref 150–450)
POTASSIUM SERPL-SCNC: 3.7 MMOL/L (ref 3.4–5.3)
QUANTIFERON MITOGEN: 9 IU/ML
QUANTIFERON NIL TUBE: 0.14 IU/ML
QUANTIFERON TB1 TUBE: 0.15 IU/ML
QUANTIFERON TB2 TUBE: 0.2
RBC # BLD AUTO: 3.67 10E6/UL (ref 3.8–5.2)
SODIUM SERPL-SCNC: 140 MMOL/L (ref 136–145)
WBC # BLD AUTO: 6.4 10E3/UL (ref 4–11)

## 2022-10-20 PROCEDURE — 85027 COMPLETE CBC AUTOMATED: CPT | Performed by: NURSE PRACTITIONER

## 2022-10-20 PROCEDURE — U0003 INFECTIOUS AGENT DETECTION BY NUCLEIC ACID (DNA OR RNA); SEVERE ACUTE RESPIRATORY SYNDROME CORONAVIRUS 2 (SARS-COV-2) (CORONAVIRUS DISEASE [COVID-19]), AMPLIFIED PROBE TECHNIQUE, MAKING USE OF HIGH THROUGHPUT TECHNOLOGIES AS DESCRIBED BY CMS-2020-01-R: HCPCS | Performed by: NURSE PRACTITIONER

## 2022-10-20 PROCEDURE — 80048 BASIC METABOLIC PNL TOTAL CA: CPT | Performed by: NURSE PRACTITIONER

## 2022-10-20 PROCEDURE — 36415 COLL VENOUS BLD VENIPUNCTURE: CPT | Performed by: NURSE PRACTITIONER

## 2022-10-20 NOTE — PROGRESS NOTES
"[facility] GERIATRICS    Chief Complaint   Patient presents with     Nursing Home Acute     HPI:  Cami Turner is a 77 year old  (1945), who is being seen today for an episodic care visit at: [facility]  ([facility]) [[id number]]. Today's concern is:   1. Essential hypertension      BP elevated 155/79, 175/86, 168/88; HR 71, 64, 74    Denies headaches, vision changes, CP, dizziness/ lightheadedness, edema. Reports bowels moving regularly. Denies dysuria, trouble voiding.    Allergies, and PMH/PSH reviewed in EPIC today.  REVIEW OF SYSTEMS:  4 point ROS including Respiratory, CV, GI and , other than that noted in the HPI,  is negative    Objective:   BP (!) 155/79   Pulse 71   Temp 97.7  F (36.5  C)   Resp 18   Ht 1.575 m (5' 2\")   Wt 92.8 kg (204 lb 9.6 oz)   LMP  (LMP Unknown)   SpO2 91%   BMI 37.42 kg/m    GENERAL APPEARANCE:  Alert, in NAD  HEENT: normocephalic, moist mucous membranes, nose without drainage or crusting  RESP:  respiratory effort normal, no respiratory distress, Lung sounds clear, patient is on RA  CV: auscultation of heart done, rate and rhythm regular  MS: moves extremities freely  PSYCH:  affect and mood normal      Labs done in SNF are in [facility]. Please refer to them using [facility]/Care Everywhere. and Recent labs in EPIC reviewed by me today.     Assessment/Plan:  (I10) Essential hypertension  (primary encounter diagnosis)  Mild to moderate pulmonary HTN  Comment: uncontrolled  -Blood pressure elevated  also looks like it was elevated during hospitalization  -increased lisinopril dose 10/18  Plan: start Amlodipine 2.5 mg at bedtime. Continue lisinopril to 40 mg daily. BMP next week. VS per policy. Adjust medications as needed.      Post Medication Reconciliation Status:           Electronically signed by: LYNDSEY Chanel CNP     "

## 2022-10-21 ENCOUNTER — TRANSITIONAL CARE UNIT VISIT (OUTPATIENT)
Dept: GERIATRICS | Facility: CLINIC | Age: 77
End: 2022-10-21
Payer: MEDICARE

## 2022-10-21 VITALS
DIASTOLIC BLOOD PRESSURE: 79 MMHG | SYSTOLIC BLOOD PRESSURE: 155 MMHG | BODY MASS INDEX: 37.65 KG/M2 | TEMPERATURE: 97.7 F | HEART RATE: 71 BPM | RESPIRATION RATE: 18 BRPM | WEIGHT: 204.6 LBS | OXYGEN SATURATION: 91 % | HEIGHT: 62 IN

## 2022-10-21 DIAGNOSIS — I10 ESSENTIAL HYPERTENSION: Primary | ICD-10-CM

## 2022-10-21 LAB — SARS-COV-2 RNA RESP QL NAA+PROBE: NEGATIVE

## 2022-10-21 PROCEDURE — 99309 SBSQ NF CARE MODERATE MDM 30: CPT | Performed by: NURSE PRACTITIONER

## 2022-10-21 RX ORDER — AMLODIPINE BESYLATE 2.5 MG/1
2.5 TABLET ORAL AT BEDTIME
Start: 2022-10-21 | End: 2022-10-25

## 2022-10-21 NOTE — PATIENT INSTRUCTIONS
Yung Turner  1945  1) start Amlodipine 2.5 mg at bedtime Diagnosis: HTN. Hold if SBP<110  LYNDSEY Chanel CNP on 10/21/2022 at 9:22 AM

## 2022-10-24 ENCOUNTER — LAB REQUISITION (OUTPATIENT)
Dept: LAB | Facility: CLINIC | Age: 77
End: 2022-10-24
Payer: MEDICARE

## 2022-10-24 DIAGNOSIS — U07.1 COVID-19: ICD-10-CM

## 2022-10-24 PROCEDURE — U0005 INFEC AGEN DETEC AMPLI PROBE: HCPCS | Performed by: NURSE PRACTITIONER

## 2022-10-25 ENCOUNTER — DISCHARGE SUMMARY NURSING HOME (OUTPATIENT)
Dept: GERIATRICS | Facility: CLINIC | Age: 77
End: 2022-10-25
Payer: MEDICARE

## 2022-10-25 VITALS
SYSTOLIC BLOOD PRESSURE: 139 MMHG | OXYGEN SATURATION: 93 % | DIASTOLIC BLOOD PRESSURE: 89 MMHG | TEMPERATURE: 97.6 F | HEART RATE: 72 BPM | RESPIRATION RATE: 16 BRPM | WEIGHT: 204.6 LBS | BODY MASS INDEX: 37.65 KG/M2 | HEIGHT: 62 IN

## 2022-10-25 DIAGNOSIS — D64.9 ACUTE ANEMIA: ICD-10-CM

## 2022-10-25 DIAGNOSIS — A41.9 SEPSIS WITHOUT ACUTE ORGAN DYSFUNCTION, DUE TO UNSPECIFIED ORGANISM (H): Primary | ICD-10-CM

## 2022-10-25 DIAGNOSIS — L40.9 PSORIASIS: ICD-10-CM

## 2022-10-25 DIAGNOSIS — R29.6 MULTIPLE FALLS: ICD-10-CM

## 2022-10-25 DIAGNOSIS — M62.81 GENERALIZED MUSCLE WEAKNESS: ICD-10-CM

## 2022-10-25 DIAGNOSIS — E87.1 HYPONATREMIA: ICD-10-CM

## 2022-10-25 DIAGNOSIS — I10 ESSENTIAL HYPERTENSION: ICD-10-CM

## 2022-10-25 DIAGNOSIS — F32.A DEPRESSION, UNSPECIFIED DEPRESSION TYPE: ICD-10-CM

## 2022-10-25 DIAGNOSIS — G93.89 CEREBRAL VENTRICULOMEGALY: ICD-10-CM

## 2022-10-25 DIAGNOSIS — F41.9 ANXIETY: ICD-10-CM

## 2022-10-25 DIAGNOSIS — R53.81 PHYSICAL DECONDITIONING: ICD-10-CM

## 2022-10-25 DIAGNOSIS — E66.812 CLASS 2 OBESITY WITH BODY MASS INDEX (BMI) OF 37.0 TO 37.9 IN ADULT, UNSPECIFIED OBESITY TYPE, UNSPECIFIED WHETHER SERIOUS COMORBIDITY PRESENT: ICD-10-CM

## 2022-10-25 DIAGNOSIS — R41.89 COGNITIVE IMPAIRMENT: ICD-10-CM

## 2022-10-25 LAB — SARS-COV-2 RNA RESP QL NAA+PROBE: NEGATIVE

## 2022-10-25 PROCEDURE — 99316 NF DSCHRG MGMT 30 MIN+: CPT | Performed by: NURSE PRACTITIONER

## 2022-10-25 RX ORDER — AMLODIPINE BESYLATE 2.5 MG/1
5 TABLET ORAL AT BEDTIME
Start: 2022-10-25 | End: 2022-11-01

## 2022-10-25 NOTE — PATIENT INSTRUCTIONS
[facility] Discharge Orders    Name: Cami Turner  : 1945  Planned Discharge Date: 10/26/22  Discharged to: home    MEDICAL FOLLOW UP  Follow up with PCP in 1 week  Follow up with [facility] neurology to evaluate chronic ventriculomegaly     Medication changes:  Lisinopril dose increased  Amlodipine was started and dose increased  Ativan discontinued, as not used during TCU stay    Follow up labs: CBC, BMP  through home care with results to PCP  DISCHARGE MEDICATIONS:  The patient s pharmacy is authorized to dispense a 30-day supply of medications. Refill requests should be directed to the primary provider, Tisha Trevino  Current Outpatient Medications   Medication Sig Dispense Refill    albuterol (PROAIR HFA/PROVENTIL HFA/VENTOLIN HFA) 108 (90 Base) MCG/ACT inhaler Inhale 2 puffs into the lungs every 6 hours as needed      amLODIPine (NORVASC) 2.5 MG tablet Take 2 tablets (5 mg) by mouth At Bedtime      aspirin (ASA) 81 MG EC tablet Take 1 tablet (81 mg) by mouth daily for 30 days  1    clobetasol (TEMOVATE) 0.05 % external solution Apply to scalp BID x 1-2 weeks PRN flares 50 mL 3    escitalopram (LEXAPRO) 10 MG tablet Take 1 tablet (10 mg) by mouth daily 90 tablet 3    lisinopril (ZESTRIL) 40 MG tablet TAKE 1 TABLET EVERY DAY      mometasone (ELOCON) 0.1 % external solution Apply topically daily as needed (Flareups) Apply to scalp once daily (lather and let sit for 5 minutes then wash off) for 7 days and then use as needed for flare ups 60 mL 1    nystatin (MYCOSTATIN) 776594 UNIT/GM external cream Apply topically daily as needed      triamcinolone (KENALOG) 0.1 % external cream Apply to on the legs, trunk, arms, groin BID x 2-3 weeks PRN flares 80 g 2       SERVICES:  Home Care:  Occupational Therapy, Physical Therapy, Registered Nurse, Home Health Aide and From:  [facility]      LYNDSEY Chanel CNP  This document was electronically signed on   2022

## 2022-10-25 NOTE — PROGRESS NOTES
[facility] GERIATRICS DISCHARGE SUMMARY  PATIENT'S NAME: Cami Turner  YOB: 1945  MEDICAL RECORD NUMBER:  [id number]  Place of Service where encounter took place:  [facility]  ([facility]) [[id number]]    PRIMARY CARE PROVIDER AND CLINIC RESPONSIBLE AFTER TRANSFER:   Tisha Trevino CNP, [address] [facility] [address]    [facility] Provider     Transferring providers: LYNDSEY Chanel CNP, Noemí Kc MD  Recent Hospitalization/ED:  [facility] stay 10/13/22 to 10/17/22.  Date of SNF Admission: October 17, 2022  Date of SNF (anticipated) Discharge: October 26, 2022  Discharged to: previous independent home  Cognitive Scores: SLUMS 19/30, CPT 4.9/5.6 which is consistent with mild cognitive deficit deficit, patient able to live alone with daily assist to monitor for safety, new problem solving, assist with medication management, finances, higher level IADLs  Physical Function: Ambulating 300 feet with 2 wheel walker, with supervision, completed 6 steps contact-guard assist with 2 handrails, upper body dressing, lower body dressing and toileting supervision      CODE STATUS/ADVANCE DIRECTIVES DISCUSSION: NO CPR- DNI  ALLERGIES: Patient has no known allergies.    NURSING FACILITY COURSE     Medication changes:  Lisinopril dose increased  Amlodipine was started and dose increased  Ativan discontinued, as not used during TCU stay    Past medical history significant for hypertension, psoriasis, depression.       Summary of recent hospitalization:  Patient was hospitalized at [facility] from 10/13 to 10/17/2022.  She presented to the emergency department for evaluation of fall and generalized weakness.  Patient reportedly walking at [facility] and began to feel weak like her knees were going to give out, attempted to sit, but was unable and fell to the floor.  She had another similar event at home.  Evaluation in the emergency department revealed  elevated blood pressures to 190/88, afebrile.  EKG revealed normal sinus rhythm.  Laboratory evaluation significant for sodium 134, white blood cell count 13.6, troponin high-sensitivity normal.  MRI of brain showed no acute process, unchanged ventriculomegaly, which is also been present on previous head CT.  Also noted patchy nonspecific T2 hyperintense marrow signal and enhancement involving the right mandibular condyle in the region of the right temporal mandibular joint, may be reactive to degenerative arthropathy.  Neck and brain MRA showed mild to moderate focal stenosis of the right posterior cerebral artery, unchanged severe stenosis at the origin of the right subclavian artery due to atherosclerotic disease, otherwise no significant obstructions.  Patient required assisted to in the emergency department for ambulation, so she was admitted.  She received IV hydration.  UA abnormal, but urine culture grew less than 1000 CFU/mL mixture of urogenital gayla. TTE revealed left ventricular systolic function normal, LVEF 60 to 65%, no regional wall motion abnormalities.  Mild mitral stenosis, mild tricuspid regurgitation, mild valvular aortic stenosis, mild to moderate pulmonary HTN. Patient then developed fever of 103.2 on 10/14 and met criteria for sepsis. No clear source of infection. Blood culture negative. Received zosyn and changed to augmentin at discharge to complete 7 day course. Hyponatremia resolved after IVF fluids. Started on aspirin inpatient. Discharged to TCU for physical rehabilitation and medical management.    Summary of nursing facility stay:   While in the TCU patient worked with therapy, until she met goals for safe discharge home.  Based on her cognitive testing it is recommended that she have daily assistance to monitor for safety, new problem solving, assist with medication management, finances, higher level IADLs.  She will continue therapy through home care.  While in the TCU she did have  her blood pressure medication increased.  She should follow-up with her primary care provider in 1 week.  It was also recommended that she follow-up with neurology outpatient for chronic ventriculomegaly.  BMP ordered through home care to ensure stability after dose increase in lisinopril. CBC also ordered.     (A41.9) Sepsis without acute organ dysfunction, due to unspecified organism (H)  (primary encounter diagnosis)  Comment: developed fever of 103.2 on 10/14 and met criteria for sepsis  - No clear source of infection.   -Blood culture negative.   -Received zosyn inpatient and changed to augmentin at discharge. Course was completed in TCU  -is without symptoms today  -afebrile and hemodynamically stable  Plan: Monitor for symptoms.      (M62.81) Generalized muscle weakness  (R29.6) Multiple falls  (R53.81) Physical deconditioning  Comment: etiology of weakness not clear, could be possible sepsis contributing as above  -Completed course of therapy in the TCU  Plan: Continue therapy through home care.     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 Follow up with neurology outpatient for additional evaluation.      (I10) Essential hypertension  Mild to moderate pulmonary HTN  Comment: Acute on chronic,  -Blood pressure still elevated at times 144/78, 139/89, 147/86, 173/77; HR 93, 72, 82  -increased lisinopril dose 10/18, was started on amlodipine on 10/21 and dose increased today  Plan: Continue lisinopril 40 mg daily. Increase amlodipine to 5 mg daily. BMP 11/1  "through home care. VS per policy. Adjust medications as needed.     (D64.9) Acute anemia  Comment: baseline hemoglobin around 12, trended down while inpatient  -Hemoglobin 10.5 on 10/20  Plan: CBC 11/1 through home care.  Monitor for signs and symptoms of bleeding.     (E87.1) Hyponatremia-resolved  Comment: sodium 140 on 10/20/2022- improved with IV hydration  Plan: BMP PRN     (F32.A) Depression, unspecified depression type  (F41.9) Anxiety  Comment: chronic, mood seems ok today  -ativan not used in TCU, so discontinued  Plan: Continue escitalopram 10 mg daily. Monitor symptoms. Follow up with PCP     (L40.9) Psoriasis  Comment: reportedly with rash to hip present on admission  Plan: Continue triamcinolone 0.1% BID x2 weeks or until rash resolves. Monitor symptoms     (E66.9,  Z68.37) Class 2 obesity with body mass index (BMI) of 37.0 to 37.9 in adult, unspecified obesity type, unspecified whether serious comorbidity present  Comment: Estimated body mass index is 37.42 kg/m  as calculated from the following:    Height as of this encounter: 1.575 m (5' 2\").    Weight as of this encounter: 92.8 kg (204 lb 9.6 oz).  -this is clinically significant due to increased resources needed to assist in caring for patient  Plan: Dietician following, encourage weight loss.      (R41.89) cognitive impairment  Comment: cognitive testing in TCU consistent with mild cognitive impairment  -SLUMS 19/30, CPT 4.9/5.6 which is consistent with mild cognitive deficit deficit, patient able to live alone with daily assist to monitor for safety, new problem solving, assist with medication management, finances, higher level IADLs  Plan: Follow up outpatient.     Discharge Medications:  MED REC REQUIRED{  Post Medication Reconciliation Status:  Medication reconciliation previously completed during another office visit      Current Outpatient Medications   Medication Sig Dispense Refill     albuterol (PROAIR HFA/PROVENTIL HFA/VENTOLIN HFA) 108 " "(90 Base) MCG/ACT inhaler Inhale 2 puffs into the lungs every 6 hours as needed       amLODIPine (NORVASC) 2.5 MG tablet Take 2 tablets (5 mg) by mouth At Bedtime       aspirin (ASA) 81 MG EC tablet Take 1 tablet (81 mg) by mouth daily for 30 days  1     clobetasol (TEMOVATE) 0.05 % external solution Apply to scalp BID x 1-2 weeks PRN flares 50 mL 3     escitalopram (LEXAPRO) 10 MG tablet Take 1 tablet (10 mg) by mouth daily 90 tablet 3     lisinopril (ZESTRIL) 40 MG tablet TAKE 1 TABLET EVERY DAY       mometasone (ELOCON) 0.1 % external solution Apply topically daily as needed (Flareups) Apply to scalp once daily (lather and let sit for 5 minutes then wash off) for 7 days and then use as needed for flare ups 60 mL 1     nystatin (MYCOSTATIN) 607819 UNIT/GM external cream Apply topically daily as needed       triamcinolone (KENALOG) 0.1 % external cream Apply to on the legs, trunk, arms, groin BID x 2-3 weeks PRN flares 80 g 2         Past Medical History:   Past Medical History:   Diagnosis Date     Morbid obesity (H) 07/06/2020     Physical Exam:   Vitals: /89   Pulse 72   Temp 97.6  F (36.4  C)   Resp 16   Ht 1.575 m (5' 2\")   Wt 92.8 kg (204 lb 9.6 oz)   LMP  (LMP Unknown)   SpO2 93%   BMI 37.42 kg/m    BMI: Body mass index is 37.42 kg/m .  GENERAL APPEARANCE:  Alert, in NAD  HEENT: normocephalic, moist mucous membranes, nose without drainage or crusting  RESP:  respiratory effort normal, no respiratory distress, Lung sounds clear, patient is on RA  CV: auscultation of heart done, rate and rhythm regular.  ABDOMEN: + bowel sounds, soft, nontender, no grimacing or guarding with palpation.  M/S:  no lower extremity edema  NEURO: moves extremities freely  PSYCH: oriented x 3, affect and mood normal      SNF labs: [facility]. Please refer to them using [facility]/Care Everywhere. and Recent labs in [facility] reviewed by me today.     DISCHARGE PLAN:    Follow up labs: CBC, BMP 11/1 " through home care with results to PCP    Medical Follow Up:     Follow up with primary care provider in 1 weeks  Follow up with specialist neurology to evaluate chronic ventriculomegaly     Discharge Services: Home Care:  Occupational Therapy, Physical Therapy, Registered Nurse, Home Health Aide and From:  [facility]      TOTAL DISCHARGE TIME:   Greater than 30 minutes  Electronically signed by:  LYNDSEY Chanel CNP     Home care Face to Face documentation done in EPIC attached to Home care orders for [facility].

## 2022-10-26 ENCOUNTER — TELEPHONE (OUTPATIENT)
Dept: FAMILY MEDICINE | Facility: CLINIC | Age: 77
End: 2022-10-26

## 2022-10-26 NOTE — TELEPHONE ENCOUNTER
Reason for Call:  Other call back    Detailed comments: [facility] is requesting the verbal okay to delay care until 10/29/2022, per patient request.     Phone Number Patient can be reached at: Other phone number:  Michael- [phone]     Best Time: Any    Can we leave a detailed message on this number? YES    Call taken on 10/26/2022 at 2:16 PM by Brenna Main

## 2022-10-28 NOTE — TELEPHONE ENCOUNTER
FYI-verbal orders given     Called [facility] care-verbal ok to delay care per patient request     Rosario LORENZANA RN   [facility] Triage

## 2022-10-29 ENCOUNTER — TRANSFERRED RECORDS (OUTPATIENT)
Dept: HEALTH INFORMATION MANAGEMENT | Facility: CLINIC | Age: 77
End: 2022-10-29

## 2022-10-31 ENCOUNTER — TELEPHONE (OUTPATIENT)
Dept: FAMILY MEDICINE | Facility: CLINIC | Age: 77
End: 2022-10-31

## 2022-10-31 DIAGNOSIS — F41.9 ANXIETY: Primary | ICD-10-CM

## 2022-10-31 NOTE — TELEPHONE ENCOUNTER
LORazepam (ATIVAN) 0.5 MG tablet (Discontinued)          Last Written Prescription Date:  5.7.21  Last Fill Quantity: 30 tablet,  # refills: 0   Last office visit: 7/12/2022 with prescribing provider: ADALI ZAMUDIO NP         Future Office Visit:        Routing refill request to provider for review/approval because:  Drug not on the [facility], P or [facility] refill protocol or controlled substance

## 2022-10-31 NOTE — TELEPHONE ENCOUNTER
MTM referral from: Transitions of Care (recent hospital discharge or ED visit)    MTM referral outreach attempt #1 on October 31, 2022 at 1:20 PM      Outcome: Spoke with patient care person Cecy who declined visit.  Patient has someone helping with medications.    VALERIANO Jeffries

## 2022-11-01 ENCOUNTER — TELEPHONE (OUTPATIENT)
Dept: FAMILY MEDICINE | Facility: CLINIC | Age: 77
End: 2022-11-01

## 2022-11-01 ENCOUNTER — OFFICE VISIT (OUTPATIENT)
Dept: FAMILY MEDICINE | Facility: CLINIC | Age: 77
End: 2022-11-01
Payer: MEDICARE

## 2022-11-01 VITALS
SYSTOLIC BLOOD PRESSURE: 123 MMHG | TEMPERATURE: 98 F | HEART RATE: 103 BPM | BODY MASS INDEX: 33.84 KG/M2 | DIASTOLIC BLOOD PRESSURE: 75 MMHG | HEIGHT: 62 IN | WEIGHT: 183.9 LBS | OXYGEN SATURATION: 96 %

## 2022-11-01 DIAGNOSIS — R29.6 MULTIPLE FALLS: ICD-10-CM

## 2022-11-01 DIAGNOSIS — G47.00 INSOMNIA, UNSPECIFIED TYPE: ICD-10-CM

## 2022-11-01 DIAGNOSIS — I10 ESSENTIAL HYPERTENSION: ICD-10-CM

## 2022-11-01 DIAGNOSIS — I10 BENIGN ESSENTIAL HYPERTENSION: Primary | ICD-10-CM

## 2022-11-01 LAB
ANION GAP SERPL CALCULATED.3IONS-SCNC: 7 MMOL/L (ref 3–14)
BUN SERPL-MCNC: 18 MG/DL (ref 7–30)
CALCIUM SERPL-MCNC: 9.5 MG/DL (ref 8.5–10.1)
CHLORIDE BLD-SCNC: 103 MMOL/L (ref 94–109)
CO2 SERPL-SCNC: 25 MMOL/L (ref 20–32)
CREAT SERPL-MCNC: 0.79 MG/DL (ref 0.52–1.04)
ERYTHROCYTE [DISTWIDTH] IN BLOOD BY AUTOMATED COUNT: 13.8 % (ref 10–15)
GFR SERPL CREATININE-BSD FRML MDRD: 76 ML/MIN/1.73M2
GLUCOSE BLD-MCNC: 91 MG/DL (ref 70–99)
HCT VFR BLD AUTO: 37 % (ref 35–47)
HGB BLD-MCNC: 12.2 G/DL (ref 11.7–15.7)
MCH RBC QN AUTO: 29.1 PG (ref 26.5–33)
MCHC RBC AUTO-ENTMCNC: 33 G/DL (ref 31.5–36.5)
MCV RBC AUTO: 88 FL (ref 78–100)
PLATELET # BLD AUTO: 318 10E3/UL (ref 150–450)
POTASSIUM BLD-SCNC: 4.4 MMOL/L (ref 3.4–5.3)
RBC # BLD AUTO: 4.19 10E6/UL (ref 3.8–5.2)
SODIUM SERPL-SCNC: 135 MMOL/L (ref 133–144)
WBC # BLD AUTO: 5.6 10E3/UL (ref 4–11)

## 2022-11-01 PROCEDURE — 99214 OFFICE O/P EST MOD 30 MIN: CPT | Performed by: NURSE PRACTITIONER

## 2022-11-01 PROCEDURE — 36415 COLL VENOUS BLD VENIPUNCTURE: CPT | Performed by: NURSE PRACTITIONER

## 2022-11-01 PROCEDURE — 80048 BASIC METABOLIC PNL TOTAL CA: CPT | Performed by: NURSE PRACTITIONER

## 2022-11-01 PROCEDURE — 85027 COMPLETE CBC AUTOMATED: CPT | Performed by: NURSE PRACTITIONER

## 2022-11-01 RX ORDER — AMLODIPINE BESYLATE 2.5 MG/1
5 TABLET ORAL AT BEDTIME
Qty: 90 TABLET | Refills: 3 | Status: SHIPPED | OUTPATIENT
Start: 2022-11-01 | End: 2023-05-12

## 2022-11-01 RX ORDER — ZOLPIDEM TARTRATE 5 MG/1
5 TABLET ORAL
Qty: 10 TABLET | Refills: 0 | Status: SHIPPED | OUTPATIENT
Start: 2022-11-01 | End: 2023-09-11

## 2022-11-01 NOTE — TELEPHONE ENCOUNTER
"Called patient to inquire about request , patient still using     Helps with anxiety, \"my  has dementia\"     Patient has been taking 1/2 tablet when she needs it 3 times a week     Last office visit was 7/12/22  Pharmacy - [facility]     Rosario LORENZANA RN   [facility] Triage       "

## 2022-11-01 NOTE — TELEPHONE ENCOUNTER
Michelle calling RN from [facility] requesting verbal orders.  Verbal orders given.  Patient had F2F today with primary care provider. Verbal order given.      SKILLED NURSE VISIT 1x/ week for 2 weeks then every other week through certification period thru 12/27/22 with 3 prn visits    FYI physical therapy was put in with SOC referral.      Call back at [phone] to thiago

## 2022-11-01 NOTE — PROGRESS NOTES
Assessment & Plan     Benign essential hypertension  Update labs, continue medications.  - Basic metabolic panel  (Ca, Cl, CO2, Creat, Gluc, K, Na, BUN)  - Basic metabolic panel  (Ca, Cl, CO2, Creat, Gluc, K, Na, BUN)    Multiple falls  Labs, plan for home care regularly.  - CBC with platelets  - CBC with platelets    Essential hypertension  0 amLODIPine (NORVASC) 2.5 MG tablet  Dispense: 90 tablet; Refill: 3    Insomnia, unspecified type  Refill given. Using sparingly 1-2 times per month.  - zolpidem (AMBIEN) 5 MG tablet  Dispense: 10 tablet; Refill: 0      Review of the result(s) of each unique test - lab  Ordering of each unique test  Prescription drug management       MED REC REQUIRED  Post Medication Reconciliation Status:  Discharge medications reconciled, continue medications without change    See Patient Instructions    Return in about 3 months (around 2/1/2023) for Follow up.    Tisha Trevino CNP  [facility] PRIOR ENMANUEL Almanza is a 77 year old accompanied by her daughter, presenting for the following health issues:  Hospital F/U      hospitals       Hospital Follow-up Visit:    Hospital/Nursing Home/IP Rehab Facility: [facility]  Date of Admission: 10/13/22  Date of Discharge: 10/17/22  Reason(s) for Admission: Multiple falls    Was your hospitalization related to COVID-19? No   Problems taking medications regularly:  None  Medication changes since discharge: Yes.  TCU began an additional BP medication.  Problems adhering to non-medication therapy:  None    Summary of hospitalization:  [facility] discharge summary reviewed  Diagnostic Tests/Treatments reviewed.  Follow up needed: homecare  Other Healthcare Providers Involved in Patient s Care:         Homecare  Update since discharge: improved.   Plan of care communicated with patient and family       Review of Systems   Constitutional, HEENT, cardiovascular, pulmonary, GI, , musculoskeletal,  "neuro, skin, endocrine and psych systems are negative, except as otherwise noted.      Objective    /75   Pulse 103   Temp 98  F (36.7  C) (Tympanic)   Ht 1.575 m (5' 2\")   Wt 83.4 kg (183 lb 14.4 oz)   LMP  (LMP Unknown)   SpO2 96%   BMI 33.64 kg/m    Body mass index is 33.64 kg/m .  Physical Exam   GENERAL: healthy, alert and no distress  NECK: no adenopathy, no asymmetry, masses, or scars and thyroid normal to palpation  RESP: lungs clear to auscultation - no rales, rhonchi or wheezes  CV: regular rate and rhythm, normal S1 S2, no S3 or S4, no murmur, click or rub, no peripheral edema and peripheral pulses strong  ABDOMEN: soft, nontender, no hepatosplenomegaly, no masses and bowel sounds normal  MS: no gross musculoskeletal defects noted, no edema    Results for orders placed or performed in visit on 11/01/22   CBC with platelets     Status: Normal   Result Value Ref Range    WBC Count 5.6 4.0 - 11.0 10e3/uL    RBC Count 4.19 3.80 - 5.20 10e6/uL    Hemoglobin 12.2 11.7 - 15.7 g/dL    Hematocrit 37.0 35.0 - 47.0 %    MCV 88 78 - 100 fL    MCH 29.1 26.5 - 33.0 pg    MCHC 33.0 31.5 - 36.5 g/dL    RDW 13.8 10.0 - 15.0 %    Platelet Count 318 150 - 450 10e3/uL   Basic metabolic panel  (Ca, Cl, CO2, Creat, Gluc, K, Na, BUN)     Status: Normal   Result Value Ref Range    Sodium 135 133 - 144 mmol/L    Potassium 4.4 3.4 - 5.3 mmol/L    Chloride 103 94 - 109 mmol/L    Carbon Dioxide (CO2) 25 20 - 32 mmol/L    Anion Gap 7 3 - 14 mmol/L    Urea Nitrogen 18 7 - 30 mg/dL    Creatinine 0.79 0.52 - 1.04 mg/dL    Calcium 9.5 8.5 - 10.1 mg/dL    Glucose 91 70 - 99 mg/dL    GFR Estimate 76 >60 mL/min/1.73m2             YOHANA Cates     [address] [address]  [email or web address]  [email or web address]   Office: [phone]                     "

## 2022-11-03 ENCOUNTER — TELEPHONE (OUTPATIENT)
Dept: FAMILY MEDICINE | Facility: CLINIC | Age: 77
End: 2022-11-03

## 2022-11-03 RX ORDER — LORAZEPAM 0.5 MG/1
0.5 TABLET ORAL DAILY PRN
Qty: 10 TABLET | Refills: 0 | Status: SHIPPED | OUTPATIENT
Start: 2022-11-03 | End: 2022-12-12

## 2022-11-03 NOTE — TELEPHONE ENCOUNTER
FYI-routing to pcp for review.     [facility] care calling for verbal orders    Verbal orders given for Homecare physical therapy -  Once a week for 3 weeks   once every other week for 2 weeks for   Strengthening  and balance training     Rosario LORENZANA RN   [facility]

## 2022-11-08 ENCOUNTER — TELEPHONE (OUTPATIENT)
Dept: FAMILY MEDICINE | Facility: CLINIC | Age: 77
End: 2022-11-08

## 2022-11-08 NOTE — TELEPHONE ENCOUNTER
Plan Of Care 10/29/2022 to 12/27/2022    Placed in [facility] RN folder to be have medical record recorded.    Roland Martínez

## 2022-11-08 NOTE — TELEPHONE ENCOUNTER
Reason for Call:  Form, our goal is to have forms completed with 72 hours, however, some forms may require a visit or additional information.    Type of letter, form or note:  medical    Who is the form from?: Home care    Where did the form come from: form was faxed in    What clinic location was the form placed at?: [facility]    Where the form was placed: [facility]/Folder    What number is listed as a contact on the form?: [phone]       Call taken on 11/8/2022 at 1:33 PM by Kandi Burrell

## 2022-11-10 NOTE — TELEPHONE ENCOUNTER
MED REC DONE     Discrepancies:    lisinopril (ZESTRIL)           Epic: 40 mg daily            Form:  20 mg daily       PT STATED SHE IS TAKING 40 MG DAILY     Meds on Epic but NOT  on Form:         LORazepam (ATIVAN) 0.5 MG tablet 10 tablet 0 11/3/2022  --   Sig - Route: Take 1 tablet (0.5 mg) by mouth daily as needed for anxiety - Oral   Sent to pharmacy as: LORazepam 0.5 MG Oral Tablet (ATIVAN    PT STATED SHE IS TAKING THIS PRN     zolpidem (AMBIEN) 5 MG tablet 10 tablet 0 11/1/2022 1/30/2023 --   Sig - Route: Take 1 tablet (5 mg) by mouth nightly as needed for sleep - Oral   Sent to pharmacy as: Zolpidem Tartrate 5 MG Oral Tablet (AMBIEN    PT STATED THAT SHE IS TAKING THIS PRN            Meds on Form but NOT on Epic :     1. Acetaminophen 500 mg - 2 tabs - TID/PRN  - DOES NOT TAKE IT    2. Hydrocortisone 2.5 % topical cream  - PRN topically  - DOES TAKE IT    3. Nystatin 100,000 cream - topical PRN - DOES TAKE IT         Called #   Telephone Information:   Mobile [phone]       REVIEWED MED LIST     Routing to pcp for review     Thank you     Mora Calderón RN, BSN  [facility] Triage                Routing to PCP for further review/recommendations/orders

## 2022-11-11 DIAGNOSIS — Z53.9 DIAGNOSIS NOT YET DEFINED: Primary | ICD-10-CM

## 2022-11-11 PROCEDURE — G0180 MD CERTIFICATION HHA PATIENT: HCPCS | Performed by: NURSE PRACTITIONER

## 2022-11-11 NOTE — TELEPHONE ENCOUNTER
Completed forms, placed in [facility] folder to return fax.    Tisha Trevino, CNP

## 2022-11-16 DIAGNOSIS — I10 BENIGN ESSENTIAL HYPERTENSION: ICD-10-CM

## 2022-11-18 RX ORDER — LISINOPRIL 40 MG/1
TABLET ORAL
Qty: 90 TABLET | Refills: 1 | Status: SHIPPED | OUTPATIENT
Start: 2022-11-18 | End: 2023-04-18

## 2022-11-18 NOTE — TELEPHONE ENCOUNTER
Routing to PCP for ongoing refills as last refill was given by TCU provider  Katia JEFFERSON RN, BSN

## 2022-11-22 ENCOUNTER — TELEPHONE (OUTPATIENT)
Dept: FAMILY MEDICINE | Facility: CLINIC | Age: 77
End: 2022-11-22

## 2022-11-22 NOTE — TELEPHONE ENCOUNTER
Reason for Call:  Form, our goal is to have forms completed with 72 hours, however, some forms may require a visit or additional information.    Type of letter, form or note:  medical    Who is the form from?: Home care    Where did the form come from: form was faxed in    What clinic location was the form placed at?: [facility]    Where the form was placed: Tisha Trevino Box/Folder    What number is listed as a contact on the form?: [phone]           Call taken on 11/22/2022 at 11:02 AM by Lorna Kim

## 2022-11-25 NOTE — TELEPHONE ENCOUNTER
Completed forms, placed in [facility] folder to return fax.    Tisha Trevino, CNP

## 2022-12-02 ENCOUNTER — TELEPHONE (OUTPATIENT)
Dept: FAMILY MEDICINE | Facility: CLINIC | Age: 77
End: 2022-12-02

## 2022-12-02 NOTE — TELEPHONE ENCOUNTER
Reason for Call:  Form, our goal is to have forms completed with 72 hours, however, some forms may require a visit or additional information.    Type of letter, form or note:  medical    Who is the form from?: Home care    Where did the form come from: form was faxed in    What clinic location was the form placed at?: [facility]    Where the form was placed: [facility] Box/Folder    What number is listed as a contact on the form?: [phone]           Call taken on 12/2/2022 at 9:07 AM by Lorna Kim

## 2022-12-08 ENCOUNTER — MEDICAL CORRESPONDENCE (OUTPATIENT)
Dept: HEALTH INFORMATION MANAGEMENT | Facility: CLINIC | Age: 77
End: 2022-12-08

## 2022-12-08 NOTE — TELEPHONE ENCOUNTER
Completed forms emailed to [facility]    Originals sent to be scanned.       Vianca Paul

## 2022-12-08 NOTE — TELEPHONE ENCOUNTER
Completed forms, placed in [facility] folder to return fax.    Tisha Trevino, CNP

## 2022-12-12 DIAGNOSIS — F41.9 ANXIETY: ICD-10-CM

## 2022-12-12 RX ORDER — LORAZEPAM 0.5 MG/1
TABLET ORAL
Qty: 10 TABLET | Refills: 0 | Status: SHIPPED | OUTPATIENT
Start: 2022-12-12 | End: 2023-04-28

## 2023-01-11 ENCOUNTER — TELEPHONE (OUTPATIENT)
Dept: FAMILY MEDICINE | Facility: CLINIC | Age: 78
End: 2023-01-11

## 2023-01-11 NOTE — TELEPHONE ENCOUNTER
Reason for Call:  Form, our goal is to have forms completed with 72 hours, however, some forms may require a visit or additional information.    Type of letter, form or note:  Home care Updating scheduling with patient.  FYI.    Who is the form from?: [facility]    Where did the form come from: form was faxed in    What clinic location was the form placed at?: [facility]    Where the form was placed: [facility] Box/Folder    What number is listed as a contact on the form?: [phone]           Call taken on 1/11/2023 at 12:00 PM by Lorna Kim

## 2023-01-12 ENCOUNTER — MEDICAL CORRESPONDENCE (OUTPATIENT)
Dept: HEALTH INFORMATION MANAGEMENT | Facility: CLINIC | Age: 78
End: 2023-01-12

## 2023-01-12 NOTE — TELEPHONE ENCOUNTER
Completed forms, placed in [facility] folder to return fax.    Tisha Trevino, CNP

## 2023-01-12 NOTE — TELEPHONE ENCOUNTER
Completed forms faxed back to [facility] at [phone].   Originals sent to be scanned.       Vianca Paul

## 2023-03-02 ENCOUNTER — TELEPHONE (OUTPATIENT)
Dept: FAMILY MEDICINE | Facility: CLINIC | Age: 78
End: 2023-03-02
Payer: MEDICARE

## 2023-03-02 DIAGNOSIS — G47.00 INSOMNIA, UNSPECIFIED TYPE: Primary | ICD-10-CM

## 2023-03-02 RX ORDER — ZOLPIDEM TARTRATE 5 MG/1
5 TABLET ORAL
Qty: 10 TABLET | Refills: 0 | Status: SHIPPED | OUTPATIENT
Start: 2023-03-02 | End: 2023-07-14

## 2023-03-02 NOTE — TELEPHONE ENCOUNTER
zolpidem (AMBIEN) 5 MG tablet          Last Written Prescription Date:  11.1.22  Last Fill Quantity: 10 tablet,  # refills: 0   Last office visit: 11/1/2022 with prescribing provider: ADALI ZAMUDIO NP       Future Office Visit:      Future Appointments 3/2/2023 - 8/29/2023      Date Visit Type Length Department Provider     6/7/2023 10:00 AM NEW 60 min CS NEUROLOGY Gilson Casarez MD    Location Instructions:     [address] [place] [address], [address] [facility] [address]              7/14/2023  9:30 AM ANNUAL WELLNESS 30 min  FAMILY PRACTICE Tisha Trevino CNP    Location Instructions:     [facility] is located at [address] [place] [address], along [address] Free parking is available; access the lot by turning north from [address] onto [address], then west onto [address].                 Routing refill request to provider for review/approval because:  Drug not on the [facility], [facility] or [facility] refill protocol or controlled substance

## 2023-04-27 DIAGNOSIS — F41.9 ANXIETY: ICD-10-CM

## 2023-04-28 RX ORDER — LORAZEPAM 0.5 MG/1
TABLET ORAL
Qty: 10 TABLET | Refills: 0 | Status: SHIPPED | OUTPATIENT
Start: 2023-04-28 | End: 2023-06-09

## 2023-06-06 NOTE — PROGRESS NOTES
INITIAL NEUROLOGY CONSULTATION    DATE OF VISIT: 6/7/2023  CLINIC LOCATION: [facility]  MRN: [id number]  PATIENT NAME: Cami Turner  YOB: 1945    REASON FOR VISIT: No chief complaint on file.    HISTORY OF PRESENT ILLNESS:                                                    Ms. Cami Turner is 77 year old right handed female patient with past medical history of hypertension, depression, obesity, and falls, who was seen today for memory concerns/possibility of normal pressure hydrocephalus and asymptomatic intracranial stenosis.    Per patient's report, in October 2022 she was admitted to [facility] following a fall due to presyncopal event.  Found to have possible sepsis along with ventriculomegaly and mild P1 segment focal stenosis on MRI.  Was referred to neurology for further evaluation.    Today, the patient reports ***.    Laboratory evaluation from November 2022 includes unremarkable BMP and CBC.  I do not see B12 level or TSH in our system.    Brain MRI with and without contrast from 10/13/2022 demonstrated unchanged ventriculomegaly, felt somewhat disproportionate to the degree of sulcal prominence raising concern for chronic normal pressure hydrocephalus.  In addition, brain parenchymal volume loss and presumed chronic small vessel ischemic changes along with nonspecific T2 hyperintensities were noted.  Head and neck MRA demonstrated mild to moderate focal stenosis of the distal P1 segment of the right posterior cerebral artery without additional abnormalities.  CTA of head and neck from 6/5/2021 was negative for significant stenosis.  All images were personally reviewed and independently interpreted.    No additional useful information is available in Care Everywhere, which was reviewed.  PAST MEDICAL/SURGICAL HISTORY:                                                    I personally reviewed patient's past medical and surgical history with the patient at  today's visit.  MEDICATIONS:                                                    I personally reviewed patient's medications and allergies with the patient at today's visit.  ALLERGIES:                                                    No Known Allergies  EXAM:                                                    VITAL SIGNS:   LMP  (LMP Unknown)   Mini-Cog Assessment:       General: pt is in NAD, cooperative.  Skin: normal turgor, moist mucous membranes, no lesions/rashes noticed.  HEENT: ATNC, EOMI, PERRL, white sclera, normal conjunctiva, no nystagmus or ptosis. No carotid bruits bilaterally.  Respiratory: lung sounds clear to auscultation bilaterally, no crackles, wheezes, rhonchi. Symmetric lung excursion, no accessory respiratory muscle use.  Cardiovascular: normal S1/S2, no murmurs/rubs/gallops.   Abdomen: Not distended.  : deferred.    Neurological:  Mental: alert, follows commands,  /5 with ***/3 on memory recall, no aphasia or dysarthria. Fund of knowledge is {MYAPPROPRIATE:218079}  Cranial Nerves:  CN II: visual acuity - able to accurately count fingers with each eye. Visual fields intact, fundi: discs sharp, no papilledema and normal vessels bilaterally.  CN III, IV, VI: EOM intact, pupils equal and reactive  CN V: facial sensation nl  CN VII: face symmetric, no facial droop  CN VIII: hearing normal  CN IX: palate elevation symmetric, uvula at midline  CN XI SCM normal, shoulder shrug nl  CN XII: tongue midline  Motor: Strength: 5/5 in all major groups of all extremities. Normal tone. No abnormal movements. No pronator drift b/l.  Reflexes: Triceps, biceps, brachioradialis, patellar, and achilles reflexes normal and symmetric. No clonus noted. Toes are down-going b/l.   Sensory: light touch, pinprick, and vibration intact. Romberg: negative.  Coordination: FNF and heel-shin tests intact b/l.   Gait:  Normal, able to tandem walk *** without difficulty.  DATA:   LABS/EEG/IMAGING/OTHER STUDIES: I reviewed  pertinent medical records, as detailed in the history of present illness.  ASSESSMENT AND PLAN:      ASSESSMENT: Cami Turner is a 77 year old female patient with listed above past medical history, who presents with ***.    We had a detailed discussion with the patient regarding her presenting complaints.  The neurological exam today is ***.    DIAGNOSES:  No diagnosis found.  PLAN: There are no Patient Instructions on file for this visit.    Total Time: *** minutes spent on the date of the encounter doing chart review, history and exam, documentation and further activities per the note.    Gilson Casarez MD  [facility] Neurology  (Chart documentation was completed in part with Dragon voice-recognition software. Even though reviewed, some grammatical, spelling, and word errors may remain.)

## 2023-06-07 ENCOUNTER — OFFICE VISIT (OUTPATIENT)
Dept: NEUROLOGY | Facility: CLINIC | Age: 78
End: 2023-06-07
Attending: HOSPITALIST
Payer: MEDICARE

## 2023-06-07 VITALS — OXYGEN SATURATION: 97 % | SYSTOLIC BLOOD PRESSURE: 147 MMHG | DIASTOLIC BLOOD PRESSURE: 84 MMHG | HEART RATE: 71 BPM

## 2023-06-07 DIAGNOSIS — I66.9 ASYMPTOMATIC STENOSIS OF INTRACRANIAL ARTERY: ICD-10-CM

## 2023-06-07 DIAGNOSIS — R41.3 MEMORY LOSS: Primary | ICD-10-CM

## 2023-06-07 PROCEDURE — 99205 OFFICE O/P NEW HI 60 MIN: CPT | Performed by: PSYCHIATRY & NEUROLOGY

## 2023-06-07 RX ORDER — CHOLECALCIFEROL (VITAMIN D3) 50 MCG
1 TABLET ORAL DAILY
COMMUNITY

## 2023-06-07 RX ORDER — ASPIRIN 81 MG/1
81 TABLET, CHEWABLE ORAL DAILY
COMMUNITY

## 2023-06-07 ASSESSMENT — MONTREAL COGNITIVE ASSESSMENT (MOCA)
9. REPEAT EACH SENTENCE: 1
VISUOSPATIAL/EXECUTIVE SUBSCORE: 4
11. FOR EACH PAIR OF WORDS, WHAT CATEGORY DO THEY BELONG TO (OUT OF 2): 2
13. ORIENTATION SUBSCORE: 6
10. [FLUENCY] NAME WORDS STARTING WITH DESIGNATED LETTER: 0
8. SERIAL SUBTRACTION OF 7S: 1
12. MEMORY INDEX SCORE: 1
6. READ LIST OF DIGITS [FORWARD/BACKWARD]: 1
7. [VIGILENCE] TAP WHEN HEARING DESIGNATED LETTER: 1
WHAT LEVEL OF EDUCATION WAS ATTAINED: 0
WHAT IS THE TOTAL SCORE (OUT OF 30): 20
4. NAME EACH OF THE THREE ANIMALS SHOWN: 3

## 2023-06-07 NOTE — PROGRESS NOTES
"Cami Turner is a 77 year old female who presents for:  Chief Complaint   Patient presents with     Consult     Memory Concerns         Initial Vitals:  BP (!) 147/84 (BP Location: Right arm, Patient Position: Sitting, Cuff Size: Adult Regular)   Pulse 71   LMP  (LMP Unknown)   SpO2 97%  Estimated body mass index is 33.64 kg/m  as calculated from the following:    Height as of 11/1/22: 1.575 m (5' 2\").    Weight as of 11/1/22: 83.4 kg (183 lb 14.4 oz).. There is no height or weight on file to calculate BSA. BP completed using cuff size: regular    MoCA 7.1= 20/30    Nabila Garcia  "

## 2023-06-07 NOTE — PROGRESS NOTES
INITIAL NEUROLOGY CONSULTATION    DATE OF VISIT: 6/7/2023  CLINIC LOCATION: [facility]  MRN: [id number]  PATIENT NAME: Cami Turner  YOB: 1945    REASON FOR VISIT:   Chief Complaint   Patient presents with     Consult     Memory Concerns      HISTORY OF PRESENT ILLNESS:                                                    Ms. Cami Turner is 77 year old right handed female patient with past medical history of hypertension, depression, obesity, and falls, who was seen today for memory concerns/possibility of normal pressure hydrocephalus and asymptomatic intracranial stenosis.  Accompanied by her daughter.    Per patient's report, in October 2022 she was admitted to [facility] following a fall due to presyncopal event.  Found to have possible sepsis along with ventriculomegaly and mild P1 segment focal stenosis on MRI/MRA.  Was referred to neurology for further evaluation.    Today, the patient reports that she does not believe that she has trouble with her memory.  She admits to occasional word finding difficulty and misplacing her belongings, but has no trouble with calendar remembering upcoming events.  She continues to drive without being lost, recent car accidents or tickets.  Denies any trouble with taking her medications or paying her bills on time.  Her mood is okay.  She denies any focal neurological symptoms, prior history of head injuries, seizures, or CNS infections.    According to patient's daughter, cognitive decline started approximately 1 to 1.5 years ago.  Patient's son took over finances due to missed bills and almost lost car and life insurance coverage.  Daughter agrees with occasional word finding difficulty and misplacing her belongings.  The patient tends to repeat herself frequently.  There are no safety concerns.  Daughter is unsure about patient's medications, but recalls that the patient did a few mistakes while giving medications to her  , who has dementia and in the process of getting admitted to memory care unit.  Daughter believes that the patient is slightly more depressed given the tremendous amount of stress related to caregiving for the  and anticipated move.  Daughter does not have any additional concerns.    Laboratory evaluation from November 2022 includes unremarkable BMP and CBC.  I do not see B12 level or TSH in our system.    Brain MRI with and without contrast from 10/13/2022 demonstrated unchanged ventriculomegaly, felt somewhat disproportionate to the degree of sulcal prominence raising concern for chronic normal pressure hydrocephalus.  In addition, brain parenchymal volume loss and presumed chronic small vessel ischemic changes along with nonspecific T2 hyperintensities were noted.  Head and neck MRA demonstrated mild to moderate focal stenosis of the distal P1 segment of the right posterior cerebral artery without additional abnormalities.  CTA of head and neck from 6/5/2021 was negative for significant stenosis.  All images were personally reviewed and independently interpreted.    No additional useful information is available in Care Everywhere, which was reviewed.  PAST MEDICAL/SURGICAL HISTORY:                                                    I personally reviewed patient's past medical and surgical history with the patient at today's visit.  MEDICATIONS:                                                    I personally reviewed patient's medications and allergies with the patient at today's visit.  ALLERGIES:                                                    No Known Allergies  EXAM:                                                    VITAL SIGNS:   BP (!) 147/84 (BP Location: Right arm, Patient Position: Sitting, Cuff Size: Adult Regular)   Pulse 71   LMP  (LMP Unknown)   SpO2 97%   John Cognitive Assessment:    John Cognitive Assessment (MOCA)  Visuospatial/Executive : 4  Naming: 3  Attention -  Digits: 1  Attention - Letters: 1  Attention - Subtraction: 1  Language - Repeat: 1  Language - Fluency : 0  Abstraction: 2  Delayed Recall: 1  Orientation: 6  Education: 0  MOCA Score: 20  Administered by: : Nabila SLAUGHTER     John Cognitive Assessment Score:  MOCA Score: 20/30.     General: pt is in NAD, cooperative.  Skin: normal turgor, moist mucous membranes, no lesions/rashes noticed.  HEENT: ATNC, EOMI, PERRL, white sclera, normal conjunctiva, no nystagmus or ptosis. No carotid bruits bilaterally.  Respiratory: lung sounds clear to auscultation bilaterally, no crackles, wheezes, rhonchi. Symmetric lung excursion, no accessory respiratory muscle use.  Cardiovascular: normal S1/S2, no murmurs/rubs/gallops.   Abdomen: Not distended.  : deferred.    Neurological:  Mental: alert, follows commands, MoCA as per above, no aphasia or dysarthria. Fund of knowledge is diminished for age.  Cranial Nerves:  CN II: visual acuity - able to accurately count fingers with each eye. Visual fields intact, fundi: discs sharp, no papilledema and normal vessels bilaterally.  CN III, IV, VI: EOM intact, pupils equal and reactive  CN V: facial sensation nl  CN VII: face symmetric, no facial droop  CN VIII: hearing normal  CN IX: palate elevation symmetric, uvula at midline  CN XI SCM normal, shoulder shrug nl  CN XII: tongue midline  Motor: Strength: 5/5 in all major groups of all extremities. Normal tone. No abnormal movements. No pronator drift b/l.  Reflexes: Triceps, biceps, brachioradialis, and patellar reflexes normal and symmetric, achilles reflexes are trace bilaterally. No clonus noted. Toes are down-going b/l.   Sensory: light touch, pinprick, and vibration intact. Romberg: negative.  Coordination: FNF and heel-shin tests intact b/l.   Gait:  Normal, able to tandem walk with mild difficulty.  DATA:   LABS/EEG/IMAGING/OTHER STUDIES: I reviewed pertinent medical records, as detailed in the history of present  illness.  ASSESSMENT AND PLAN:      ASSESSMENT: Cami Turner is a 77 year old female patient with listed above past medical history, who presents with gradual cognitive decline over the last 1 to 1.5 years in context of incidental ventriculomegaly.    We had a detailed discussion with the patient and her daughter regarding her presenting complaints.  MoCA today is 20/30, consistent with mild cognitive impairment.  The neurological exam otherwise is non-focal.  We reviewed her brain MRI scans, which demonstrated mild ventriculomegaly that in my opinion would correlate with the degree of atrophy.  Normal pressure hydrocephalus would be highly unlikely.    We discussed that the clinical presentation might be consistent with vitamin deficiencies, thyroid dysfunction, cognitive changes due to not adequately treated mental health conditions, and neurodegenerative conditions with Alzheimer's disease being most common.  For further diagnostic clarification, I ordered screening labs, CPT, and neuropsychologic testing.    Regarding her mild to moderate focal stenosis of right PCA, she is on aspirin that should be continued.  I suggested to recheck her fasting lipid panel under the guidance of primary care provider to decide whether or not to start statin.  Would suggest starting it if LDL is above 100.    Cami to follow up with Primary Care provider regarding elevated blood pressure.     DIAGNOSES:    ICD-10-CM    1. Memory loss  R41.3       2. Asymptomatic stenosis of intracranial artery  I66.9 Adult Neurology  Referral        PLAN: At today's visit we thoroughly discussed various diagnostic possibilities for patient's symptoms, necessary evaluation, and the plan, which includes:  Orders Placed This Encounter   Procedures     Vitamin B6     Vitamin B12     Vitamin B1 whole blood     TSH with free T4 reflex     Methylmalonic Acid     Folate     Occupational Therapy Referral     Adult Neuropsychology Referral     No  new medications.    I recommended the patient to stay physically and mentally active with particular emphasis on daily mentally stimulating activities of her choice (such as crosswords, puzzles, sudoku, etc.), stretching exercises, walking, and healthy eating.     Additional recommendations after the work-up.    Next follow-up appointment is in the next 6 months or earlier if needed.    Total Time: 72 minutes spent on the date of the encounter doing chart review, history and exam, documentation and further activities per the note.    Gilson Casarez MD  [facility] Neurology  (Chart documentation was completed in part with Dragon voice-recognition software. Even though reviewed, some grammatical, spelling, and word errors may remain.)

## 2023-06-07 NOTE — LETTER
6/7/2023         RE: Cami Turner  [address]  [place] [facility] [place] [facility] [address]        Dear Colleague,    Thank you for referring your patient, Cami Turner, to the [facility]. Please see a copy of my visit note below.    INITIAL NEUROLOGY CONSULTATION    DATE OF VISIT: 6/7/2023  CLINIC LOCATION: [facility]  MRN: [id number]  PATIENT NAME: Cami Turner  YOB: 1945    REASON FOR VISIT:   Chief Complaint   Patient presents with     Consult     Memory Concerns      HISTORY OF PRESENT ILLNESS:                                                    Ms. Cami Turner is 77 year old right handed female patient with past medical history of hypertension, depression, obesity, and falls, who was seen today for memory concerns/possibility of normal pressure hydrocephalus and asymptomatic intracranial stenosis.  Accompanied by her daughter.    Per patient's report, in October 2022 she was admitted to [facility] following a fall due to presyncopal event.  Found to have possible sepsis along with ventriculomegaly and mild P1 segment focal stenosis on MRI/MRA.  Was referred to neurology for further evaluation.    Today, the patient reports that she does not believe that she has trouble with her memory.  She admits to occasional word finding difficulty and misplacing her belongings, but has no trouble with calendar remembering upcoming events.  She continues to drive without being lost, recent car accidents or tickets.  Denies any trouble with taking her medications or paying her bills on time.  Her mood is okay.  She denies any focal neurological symptoms, prior history of head injuries, seizures, or CNS infections.    According to patient's daughter, cognitive decline started approximately 1 to 1.5 years ago.  Patient's son took over finances due to missed bills and almost lost car and life insurance coverage.  Daughter agrees with occasional word  finding difficulty and misplacing her belongings.  The patient tends to repeat herself frequently.  There are no safety concerns.  Daughter is unsure about patient's medications, but recalls that the patient did a few mistakes while giving medications to her , who has dementia and in the process of getting admitted to memory care unit.  Daughter believes that the patient is slightly more depressed given the tremendous amount of stress related to caregiving for the  and anticipated move.  Daughter does not have any additional concerns.    Laboratory evaluation from November 2022 includes unremarkable BMP and CBC.  I do not see B12 level or TSH in our system.    Brain MRI with and without contrast from 10/13/2022 demonstrated unchanged ventriculomegaly, felt somewhat disproportionate to the degree of sulcal prominence raising concern for chronic normal pressure hydrocephalus.  In addition, brain parenchymal volume loss and presumed chronic small vessel ischemic changes along with nonspecific T2 hyperintensities were noted.  Head and neck MRA demonstrated mild to moderate focal stenosis of the distal P1 segment of the right posterior cerebral artery without additional abnormalities.  CTA of head and neck from 6/5/2021 was negative for significant stenosis.  All images were personally reviewed and independently interpreted.    No additional useful information is available in Care Everywhere, which was reviewed.  PAST MEDICAL/SURGICAL HISTORY:                                                    I personally reviewed patient's past medical and surgical history with the patient at today's visit.  MEDICATIONS:                                                    I personally reviewed patient's medications and allergies with the patient at today's visit.  ALLERGIES:                                                    No Known Allergies  EXAM:                                                    VITAL SIGNS:   BP (!)  147/84 (BP Location: Right arm, Patient Position: Sitting, Cuff Size: Adult Regular)   Pulse 71   LMP  (LMP Unknown)   SpO2 97%   Stephenson Cognitive Assessment:    John Cognitive Assessment (MOCA)  Visuospatial/Executive : 4  Naming: 3  Attention - Digits: 1  Attention - Letters: 1  Attention - Subtraction: 1  Language - Repeat: 1  Language - Fluency : 0  Abstraction: 2  Delayed Recall: 1  Orientation: 6  Education: 0  MOCA Score: 20  Administered by: : Nabila SLAUGHTER     John Cognitive Assessment Score:  MOCA Score: 20/30.     General: pt is in NAD, cooperative.  Skin: normal turgor, moist mucous membranes, no lesions/rashes noticed.  HEENT: ATNC, EOMI, PERRL, white sclera, normal conjunctiva, no nystagmus or ptosis. No carotid bruits bilaterally.  Respiratory: lung sounds clear to auscultation bilaterally, no crackles, wheezes, rhonchi. Symmetric lung excursion, no accessory respiratory muscle use.  Cardiovascular: normal S1/S2, no murmurs/rubs/gallops.   Abdomen: Not distended.  : deferred.    Neurological:  Mental: alert, follows commands, MoCA as per above, no aphasia or dysarthria. Fund of knowledge is diminished for age.  Cranial Nerves:  CN II: visual acuity - able to accurately count fingers with each eye. Visual fields intact, fundi: discs sharp, no papilledema and normal vessels bilaterally.  CN III, IV, VI: EOM intact, pupils equal and reactive  CN V: facial sensation nl  CN VII: face symmetric, no facial droop  CN VIII: hearing normal  CN IX: palate elevation symmetric, uvula at midline  CN XI SCM normal, shoulder shrug nl  CN XII: tongue midline  Motor: Strength: 5/5 in all major groups of all extremities. Normal tone. No abnormal movements. No pronator drift b/l.  Reflexes: Triceps, biceps, brachioradialis, and patellar reflexes normal and symmetric, achilles reflexes are trace bilaterally. No clonus noted. Toes are down-going b/l.   Sensory: light touch, pinprick, and vibration intact. Romberg:  negative.  Coordination: FNF and heel-shin tests intact b/l.   Gait:  Normal, able to tandem walk with mild difficulty.  DATA:   LABS/EEG/IMAGING/OTHER STUDIES: I reviewed pertinent medical records, as detailed in the history of present illness.  ASSESSMENT AND PLAN:      ASSESSMENT: Cami Turner is a 77 year old female patient with listed above past medical history, who presents with gradual cognitive decline over the last 1 to 1.5 years in context of incidental ventriculomegaly.    We had a detailed discussion with the patient and her daughter regarding her presenting complaints.  MoCA today is 20/30, consistent with mild cognitive impairment.  The neurological exam otherwise is non-focal.  We reviewed her brain MRI scans, which demonstrated mild ventriculomegaly that in my opinion would correlate with the degree of atrophy.  Normal pressure hydrocephalus would be highly unlikely.    We discussed that the clinical presentation might be consistent with vitamin deficiencies, thyroid dysfunction, cognitive changes due to not adequately treated mental health conditions, and neurodegenerative conditions with Alzheimer's disease being most common.  For further diagnostic clarification, I ordered screening labs, CPT, and neuropsychologic testing.    Regarding her mild to moderate focal stenosis of right PCA, she is on aspirin that should be continued.  I suggested to recheck her fasting lipid panel under the guidance of primary care provider to decide whether or not to start statin.  Would suggest starting it if LDL is above 100.    Cami to follow up with Primary Care provider regarding elevated blood pressure.     DIAGNOSES:    ICD-10-CM    1. Memory loss  R41.3       2. Asymptomatic stenosis of intracranial artery  I66.9 Adult Neurology  Referral        PLAN: At today's visit we thoroughly discussed various diagnostic possibilities for patient's symptoms, necessary evaluation, and the plan, which  "includes:  Orders Placed This Encounter   Procedures     Vitamin B6     Vitamin B12     Vitamin B1 whole blood     TSH with free T4 reflex     Methylmalonic Acid     Folate     Occupational Therapy Referral     Adult Neuropsychology Referral     No new medications.    I recommended the patient to stay physically and mentally active with particular emphasis on daily mentally stimulating activities of her choice (such as crosswords, puzzles, sudoku, etc.), stretching exercises, walking, and healthy eating.     Additional recommendations after the work-up.    Next follow-up appointment is in the next 6 months or earlier if needed.    Total Time: 72 minutes spent on the date of the encounter doing chart review, history and exam, documentation and further activities per the note.    Gilson Casarez MD  [facility] Neurology  (Chart documentation was completed in part with Dragon voice-recognition software. Even though reviewed, some grammatical, spelling, and word errors may remain.)          Cami Turner is a 77 year old female who presents for:  Chief Complaint   Patient presents with     Consult     Memory Concerns         Initial Vitals:  BP (!) 147/84 (BP Location: Right arm, Patient Position: Sitting, Cuff Size: Adult Regular)   Pulse 71   LMP  (LMP Unknown)   SpO2 97%  Estimated body mass index is 33.64 kg/m  as calculated from the following:    Height as of 11/1/22: 1.575 m (5' 2\").    Weight as of 11/1/22: 83.4 kg (183 lb 14.4 oz).. There is no height or weight on file to calculate BSA. BP completed using cuff size: regular    MoCA 7.1= 20/30    Nabila Garcia      Again, thank you for allowing me to participate in the care of your patient.        Sincerely,        Gilson Casarez MD    "

## 2023-06-07 NOTE — PATIENT INSTRUCTIONS
AFTER VISIT SUMMARY (AVS):    At today's visit we thoroughly discussed various diagnostic possibilities for your symptoms, necessary evaluation, and the plan, which includes:  Orders Placed This Encounter   Procedures    Vitamin B6    Vitamin B12    Vitamin B1 whole blood    TSH with free T4 reflex    Methylmalonic Acid    Folate    Occupational Therapy Referral    Adult Neuropsychology Referral     No new medications.    Stay physically and mentally active with particular emphasis on daily mentally stimulating activities of your choice (such as crosswords, puzzles, sudoku, etc.), stretching exercises, walking, and healthy eating.     Additional recommendations after the work-up.    Next follow-up appointment is in the next 6 months or earlier if needed.    Please do not hesitate to call me with any questions or concerns.    Thanks.

## 2023-06-09 DIAGNOSIS — F41.9 ANXIETY: ICD-10-CM

## 2023-06-09 RX ORDER — LORAZEPAM 0.5 MG/1
TABLET ORAL
Qty: 10 TABLET | Refills: 0 | Status: SHIPPED | OUTPATIENT
Start: 2023-06-09 | End: 2023-07-07

## 2023-06-17 ENCOUNTER — TELEPHONE (OUTPATIENT)
Dept: NEUROPSYCHOLOGY | Facility: CLINIC | Age: 78
End: 2023-06-17
Payer: MEDICARE

## 2023-06-17 NOTE — TELEPHONE ENCOUNTER
Left Voicemail (1st Attempt) and Sent Mychart (1st Attempt) for the patient to call back and schedule the following:    Appointment type: Neuropsychology Eval   Provider: None  Return date: Next available   Specialty phone number: [phone]  Additional appointment(s) needed: None  Additonal Notes:   LVM, and sent a Lala Jorgensen on 6/17/2023 at 1:14 PM

## 2023-07-05 DIAGNOSIS — F41.9 ANXIETY: ICD-10-CM

## 2023-07-07 RX ORDER — LORAZEPAM 0.5 MG/1
TABLET ORAL
Qty: 10 TABLET | Refills: 0 | Status: SHIPPED | OUTPATIENT
Start: 2023-07-07 | End: 2023-09-11

## 2023-07-07 NOTE — TELEPHONE ENCOUNTER
Attempt # 1    Called # [phone]     Left a non detailed VM to call back at [phone] and ask for any available Triage Nurse.    Vero Still RN  [facility]

## 2023-07-07 NOTE — TELEPHONE ENCOUNTER
Patient called back and states yes, she needs a refill. She has been taking 2-3 tabs a week.     Rosario LORENZANA RN   [facility] Triage

## 2023-07-07 NOTE — TELEPHONE ENCOUNTER
Was previously filling 10 pills every 6 months so unsure what has changed but if anxiety is worse we need to treat differently than regular use of lorazepam. Has filled 40 pills in last couple months which is too much use.     She is due for check on this medication anyway, so please make a visit to discuss. I will fill today however needs to last until appointment made and preferably much longer than that.     Tisha Trevino, CNP

## 2023-07-10 ENCOUNTER — THERAPY VISIT (OUTPATIENT)
Dept: OCCUPATIONAL THERAPY | Facility: CLINIC | Age: 78
End: 2023-07-10
Attending: PSYCHIATRY & NEUROLOGY
Payer: MEDICARE

## 2023-07-10 DIAGNOSIS — R41.3 MEMORY LOSS: ICD-10-CM

## 2023-07-10 PROCEDURE — 96125 COGNITIVE TEST BY HC PRO: CPT | Mod: GO,59

## 2023-07-10 PROCEDURE — 97535 SELF CARE MNGMENT TRAINING: CPT | Mod: GO

## 2023-07-10 PROCEDURE — 97165 OT EVAL LOW COMPLEX 30 MIN: CPT | Mod: GO

## 2023-07-10 NOTE — PROGRESS NOTES
OCCUPATIONAL THERAPY EVALUATION  Type of Visit: Evaluation    See electronic medical record for Abuse and Falls Screening details.    Subjective      Presenting condition or subjective complaint: Therapy  Date of onset: 06/07/23 (date of order used)    Relevant medical history: Asthma; High blood pressure; History of fractures; Menopause; Overweight   Dates & types of surgery: Left ankle, right hip, hysterectomy   Cami Turner is 77 year old right handed female patient with past medical history of hypertension, depression, obesity, and falls, who was seen today for memory concerns/possibility of normal pressure hydrocephalus and asymptomatic intracranial stenosis. Per patient's report, in October 2022 she was admitted to [facility] following a fall due to presyncopal event.  Found to have possible sepsis along with ventriculomegaly and mild P1 segment focal stenosis on MRI/MRA.  Was referred to neurology for further evaluation. At her neurology appointment on 6/7/23, the patient reports that she does not believe that she has trouble with her memory.  She admits to occasional word finding difficulty and misplacing her belongings, but has no trouble with calendar remembering upcoming events.  She continues to drive without being lost, recent car accidents or tickets.  Denies any trouble with taking her medications or paying her bills on time.  Her mood is okay.  She denies any focal neurological symptoms, prior history of head injuries, seizures, or CNS infections. According to patient's daughter, cognitive decline started approximately 1 to 1.5 years ago.  Patient's son took over finances due to missed bills and almost lost car and life insurance coverage.  Daughter agrees with occasional word finding difficulty and misplacing her belongings.  The patient tends to repeat herself frequently.  There are no safety concerns.  Daughter is unsure about patient's medications, but recalls that the patient did  a few mistakes while giving medications to her , who has dementia and in the process of getting admitted to memory care unit (admitted this past Thursday, July 6th per pt report).  Daughter believes that the patient is slightly more depressed given the tremendous amount of stress related to caregiving for the  and anticipated move.  Daughter does not have any additional concerns. Brain MRI with and without contrast from 10/13/2022 demonstrated unchanged ventriculomegaly, felt somewhat disproportionate to the degree of sulcal prominence raising concern for chronic normal pressure hydrocephalus.  In addition, brain parenchymal volume loss and presumed chronic small vessel ischemic changes along with nonspecific T2 hyperintensities were noted.  Head and neck MRA demonstrated mild to moderate focal stenosis of the distal P1 segment of the right posterior cerebral artery without additional abnormalities.  CTA of head and neck from 6/5/2021 was negative for significant stenosis. MoCA was 20/30 on 6/7/23 ([facility]).    Prior diagnostic imaging/testing results:     see above  Prior therapy history for the same diagnosis, illness or injury: No      Prior Level of Function   Transfers: Independent  Ambulation: Independent  ADL: Independent  IADL: Driving, Finances, Housekeeping, Laundry, Meal preparation, Medication management    Living Environment  Social support: Alone Has assistance from PCA, Cecy, mostly assisting with caretaking of Cami Villalba's wife. Was there about 2-3 days or more per week, will now be more like 1 day per week. Has 3 adult sons and 2 adult daughters (3 are located in the area).  Type of home: [employer]; 2-story (main level and basement) [employer], 2-story  Stairs to enter the home: Yes   Is there a railing: No   Ramp:     Stairs inside the home: Yes 14 Is there a railing: Yes   Help at home: Medication and/or finances  Equipment owned: Walker with wheels; Grab bars     Employment: No  Was a  "homemaker  Hobbies/Interests:   Daily routine includes - drinking coffee, grabbing the paper, watching the news, and will now be visiting her  now that he lives in a nursing facility (memory care, just moved last Thursday). Enjoys embroidery, reading, using the computer, and playing games on her iPad.    Patient goals for therapy: \"Don't Know.\" Endorses she would like to further assess her cognitive changes and learn more about what she can do to support her brain for safety and IND with her daily routine    Pain assessment: Pain denied     Objective     Cognitive Status Examination  Orientation: Person (place and time not assessed this date)   Level of Consciousness: Alert  Follows Commands and Answers Questions: 100% of the time, Follows single step instructions  Personal Safety and Judgement: At risk behaviors demonstrated (appears to have limited insight into the significance of her deficits)  Memory: Impaired  Attention: Alternating attention impaired, difficulty shifting between tasks, Divided attention impaired, difficulty with simultaneous tasks, Difficulty with dual tasking   Organization/Problem Solving: Problem solving impaired  Executive Function: Working memory impaired, decreased storage of information for performing tasks, Cognitive flexibility impaired, Planning ability impaired   Comments: Reports changes in STM, such as \"what did I walk into this room for?\" Estimates this is occurring more frequently than in the past. Denies any significant challenges with LTM. Occasional difficulty with word-finding. Endorses needing increased processing time. With Cecy's observations working with both Cami and Deejay, the situation with Deejay's declining health was very stressful for Cami as his primary caretaker, including the decision-making process for placing him in a facility. She estimates that Cami's cognitive changes may be related to stress and lack of self-care, which may improve now that Cami is no " "longer Deejay's primary caretaker.    Cognitive Test: Cognitive Performance Test (CPT)  Details: Scored 4.8 / 5.5 this date - see separate CPT note for details.    VISUAL SKILLS  Visual Acuity: Wears glasses (bifocals, denies changes in vision)  Visual Field: Appears normal  Visual Attention: Appears normal  Oculomotor: Appears normal    SENSATION: UE Sensation WNL, LE Sensation WNL  Per pt report    POSTURE: WFL  RANGE OF MOTION: UE AROM WFL  STRENGTH: UE Strength WFL per pt report; Right-handed  MUSCLE TONE: WFL  COORDINATION: WFL per pt report  BALANCE: WFL per pt report, pt reaching out to objects throughout kitchen for support during CPT testing this date    FUNCTIONAL MOBILITY  Assistive Device(s): None  Ambulation: mod I, increased time, hand holds  Wheelchair: n/a    BED MOBILITY: WFL, Independent    TRANSFERS: WFL, Independent    BATHING: WFL, Independent  Equipment:  walk-in, grab bars, shower chair (typically does use this)    UPPER BODY DRESSING: WFL, Independent  Equipment: n/a    LOWER BODY DRESSING: WFL, Independent  Equipment: n/a    TOILETING: WFL, Independent  Equipment: n/a    GROOMING: WFL, Independent  Equipment: n/a    EATING/SELF FEEDING: WFL, Independent   Equipment: n/a; Endorses good appetite    ACTIVITY TOLERANCE: Reports having \"good\" energy even when doing activities throughout the day. Gets up/down from her chair throughout the day. Had rehab for a couple days after her fall and hospitalization back at the end of Oct 2022. Has been completing some of those PT exercises ever since. Reports she sleeps well. Estimates about 8 hours of sleep per night. Tries to take a nap in the afternoon at about 2:30pm for about 45 minutes. Occasional difficulty with getting to sleep, but also reports a recent change to her sleep routine as her  no longer lives with her.    INSTRUMENTAL ACTIVITIES OF DAILY LIVING (IADL):   Medication management: mod I using pillbox, denies forgetting to take " medications  Meal Planning/Prep: IND with meal preparation, typically keeps meals very simple versus cooking from scratch. But occasionally will cook from scratch. Denies challenges following a recipe or turning off oven/stove.  Home/Financial Management: Has assistance for household cleaning/maintence from Cecy and from cleaning lady Chani (1x/every other week). Completes her own laundry (main level). Hardly ever has to access the downstairs level, son will change her furnace filter and put salt in her water softener for her. Manages her own bills with assistance from her son. Son assists with online payments.  Communication/Computer Use: Uses cell phone and computer, denies any significant difficulties  Community Mobility: Drives herself but mostly sticks to areas around [place] and [place] that are familiar to her. Avoids using the freeway or high trafficked areas. Only seldomly will drive home at night, typically when returning home from her daughter's house. Denies getting lost or forgetting where she is going. Did have assistance from Cecy to get to today's appointment.          Assessment & Plan   CLINICAL IMPRESSIONS   Medical Diagnosis: Memory loss    Treatment Diagnosis: Decreased safety and IND with ADLs/IADLs    Impression/Assessment: Pt is a 77 year old female presenting to Occupational Therapy due to memory loss.  The following significant findings have been identified: Impaired activity tolerance, Impaired balance, Impaired cognition and Impaired safety/judgement.  These identified deficits interfere with their ability to perform self care tasks, household chores, driving , household mobility, community mobility, medication management, financial management, meal planning and preparation and community or volunteer activities as compared to previous level of function.     Clinical Decision Making (Complexity):   Assessment of Occupational Performance: 5 or more Performance Deficits  Occupational  "Performance Limitations: driving and community mobility, health management and maintenance, home establishment and management, meal preparation and cleanup, shopping, leisure activities and social participation  Clinical Decision Making (Complexity): Moderate complexity    PLAN OF CARE  Treatment Interventions:   Interventions: Cognitive Skills, Self-Care/Home Management    Long Term Goals   OT Goal 1  Goal Identifier: Memory/Safety  Goal Description: Patient to verbalize understanding of Cognitive Performance Test results and strategies to increase patient's safety and independence in the home and community setting.  Rationale: In order to maximize safety and independence with performance of self-care activities;In order to maximize safety and independence with cognitive function within the home or community;In order to maximize independence with tasks requiring functional cognition/executive function for school, work, medication or financial mgmt;In order to safely and appropriately apply compensatory strategies with ADL/IADL performance  Goal Progress: Goal met. CPT completed this date and educated pt in results. Educated pt in compensatory strategies to support IND with ADLs/IADLs using CPT caregiver handout (level 5.0) and \"improve your memory\" handout. See flowsheet for details.  Target Date: 07/10/23  Date Met: 07/10/23      Frequency of Treatment: 1x/week  Duration of Treatment: 1 week (one time only evaluation and treatment)     Recommended Referrals to Other Professionals: none at this time  Education Assessment: Learner/Method: Patient;Caregiver (caregiver, Cecy)  Education Comments: see tx details on daily flowsheet for additional details     Risks and benefits of evaluation/treatment have been explained.   Patient/Family/caregiver agrees with Plan of Care.     Evaluation Time:    OT Eval, Low Complexity Minutes (44863): 32       Signing Clinician: GABY Christine        [facility] " [facility]                                                                                   OUTPATIENT OCCUPATIONAL THERAPY      PLAN OF TREATMENT FOR OUTPATIENT REHABILITATION   Patient's Last Name, First Name, Cami Gary YOB: 1945   Provider's Name   [facility]   Medical Record No.  [id number]     Onset Date: 06/07/23 (date of order used) Start of Care Date: 07/10/23     Medical Diagnosis:  Memory loss      OT Treatment Diagnosis:  Decreased safety and IND with ADLs/IADLs Plan of Treatment  Frequency/Duration:1x/week/1 week (one time only evaluation and treatment)    Certification date from 07/10/23   To 07/10/23        See note for plan of treatment details and functional goals     Ree Julien OTR                         I CERTIFY THE NEED FOR THESE SERVICES FURNISHED UNDER        THIS PLAN OF TREATMENT AND WHILE UNDER MY CARE     (Physician attestation of this document indicates review and certification of the therapy plan).                  Referring Provider:  Gilson Carlson*      Initial Assessment  See Epic Evaluation- 07/10/23

## 2023-07-10 NOTE — PROGRESS NOTES
"Cognitive Performance Test    SUMMARY OF TEST:    The Cognitive Performance Test (CPT) is a standardized performance-based assessment to measure working memory/executive function processing capacities that underlie functional performance. Subtasks include common basic and instrumental activities of daily living (ADL/IADL) which are rated based on the manner in which patients respond to task demands of varying complexity. The total CPT score describes a level of functioning that indicates how information is processed, implications for functional activities, potential safety risks and a recommended level of supervision or assist based on cognitive function. The highest total score on this test is in the range of 5.6 to 5.8.    DATE OF TESTIN/10/23    RESULTS OF TESTING:                                                                                         CPT Subtest Results    MEDBOX: 4.5 / 6 SHOP/GLOVES: 5.0 / 6  PHONE: 6.0 / 6   WASH:  5.0 / 5 TRAVEL: n/a TOAST: 4.0 / 5   DRESS: 4.0 / 5   TOTAL CPT SCORE:  28.5 / 33     Average CPT Score  4.8 / 5.5    INTERPRETATION OF TEST RESULTS:    Based on the Cognitive Performance Test, this patient scored at CPT Level 4.8.  See CPT Levels reference below.    Summary of functional cognitive status:   Medbox: Initially places 2 of 4 medications correctly but forgets she already placed Fluidia and placed a second time. Is able to correct Fluidia with general cue. Unable to correct prn medication with specific cue.    Shop: Does not check wallet prior to selection. Initially chooses $9.59 belt and requires VC to look at bottom belts. Is able to make exchange on her own and pays exact change for belt.    Wash: Selects soap from box and completes task in appropriate sequence.    Toast: Reports \"your toaster is broken\" and requires setup of work space to continue with task. Is then able to complete task without additional cues.    Phone: Locates white pages of the phone book, " accurately locates retail hardware phone number, dials without difficulty, and asks price question.    Dress: Selects lightweight raincoat with rain bonnet and scarf.    Factors affecting performance:  Emotional Status  New or complex medical issue    Recommendations:    Assist for ADL/IADL:  Finances and Medication management (new meds)  Supervision for ADL/IADL:  Shopping, Driving, and Medication management  Supervision in living setting:  Weekly checks                                                       TIME ADMINISTERING TEST: 25 min    TIME FOR INTERPRETATION AND PREPARATION OF REPORT: 15 min    TOTAL TIME: 40 min      CPT Levels Reference:    Patient's Average CPT Score:  4.8                                                                                                                                                  Individual scores range along a continuum as outlined below.  In addition to cognitive status, other factors may affect safety in a home environment.  Please refer to specific recommendations for this patient.    ___5.6-5.8  Normal functioning (absence of cognitive-functional disability).  Independent in managing personal affairs, monitors and directs own behavior.  Uses complex information to carry out daily activities with safety and accuracy.    Proficient with instrumental activities of daily living (IADL) and learning new activity.  Problems are anticipated, errors are avoided, and consequences of actions are considered.      __X_5.0   Mild cognitive-functional disability; deficits in working memory and executive thought processes. Difficulty using complex information. Problems may be observed with recent memory, judgment, reasoning and planning ahead. May be impulsive or have difficulty anticipating consequences.  Safety:  May require assistance to plan ahead; or to manage complex medication schedules, appointments or finances.  Hazardous activities may need to be monitored or  "limited.  ADL:  Mild functional decline.  Able to complete basic self-care and routine household tasks.  May have difficulty with complex daily tasks such as reading, writing, meal preparation, shopping or driving.   Learns through hands on teaching. Self-centered behavior or difficulty considering the needs of others may be seen related to trouble seeing the  whole picture\". Can appear disorganized or uninhibited.    ___4.5  Mild to moderate cognitive-functional disability. Significant deficits in working memory and executive thought processes. Judgment, reasoning and planning show obvious impairment.  Distractible with inability to shift attention/actions given competing stimuli.  Difficulty with problem solving and managing details. Complex daily tasks performed with inconsistency, difficulty, or error.     Safety:  Medications should be monitored, stove use may require supervision, and driving ability may be affected.  Impaired safety awareness with inability to anticipate potential problems.  May not recognize or respond to emergent situations. Requires frequent check-in support.   ADL:  Mild difficulty with simple everyday self-care tasks. Benefits from structured, routine activity.  Will likely need reminders to complete tasks outside of the routine. Requires assistance with planning and IADL tasks like shopping and finances. Learns concrete tasks through repetition, but performance may not generalize. Tends to be impulsive with poor insight. Self centered behavior or inability to consider the needs of others is common.    ___4.0  Moderate cognitive-functional disability; abstract to concrete thought processes. Working memory and executive function impairments are obvious. Difficulty with planning and problem solving.  Behavior is goal-directed, but unable to follow multi-step directions, is easily distracted, and may not recognize mistakes.  Inability to anticipate hazards or understand precautions.  Safety: "  Recommend 24-hour supervision for safety. Supervision needed for medication management and for hazardous activities. May not be able to follow a restricted diet. Can get lost in unfamiliar surroundings. Generally, persons functioning at level 4 should not be driving.   ADL:  Some decline in quality or frequency of ADL.  [place] enhanced by use of a routine, simple concrete directions, and caregiver set-up of needed items. Complex tasks such as money or home management typically requires assistance.  Relies heavily on vision to guide behavior; will ignore objects/hazards not in plain sight and can be distracted by irrelevant objects. Often has poor insight.  Able to carry out social conversation and may verbally  cover  for deficits leading caregivers to believe they are capable of functioning independently.       ___3.5  Moderate cognitive-functional disability; increased cues needed for task completion. Aware of concrete task steps but needs prompting or cues to initiate and complete simple tasks. Attention span is limited, simple directions may need to be repeated, and re-focus to a topic or task may be required.  Safety:  24-hour supervision required for safety and for assistance with daily tasks. Assistance required with medications, and access to medication should be limited. Meals, nutrition and dietary restrictions need to be monitored.  All hazardous activities should be restricted or supervised. Should not drive. Prone to wandering and can become lost.  ADL:  Moderate functional decline. Familiar tasks usually requires set-up of supplies and directions to complete steps. May need objects handed to them for task initiation. Function best with a set schedule in familiar surroundings with familiar people. All complex tasks must be done by others. Vocabulary is diminished and speech often unfocused.

## 2023-07-13 NOTE — TELEPHONE ENCOUNTER
Talked with patient and she has been experiencing more frequent anxiety. She has a preventative appointment tomorrow, explained split billing to patient and she reports understanding.     Vero Still RN [facility]

## 2023-07-14 ENCOUNTER — OFFICE VISIT (OUTPATIENT)
Dept: FAMILY MEDICINE | Facility: CLINIC | Age: 78
End: 2023-07-14
Payer: MEDICARE

## 2023-07-14 VITALS
WEIGHT: 184 LBS | OXYGEN SATURATION: 95 % | HEART RATE: 85 BPM | RESPIRATION RATE: 18 BRPM | HEIGHT: 61 IN | BODY MASS INDEX: 34.74 KG/M2 | SYSTOLIC BLOOD PRESSURE: 138 MMHG | DIASTOLIC BLOOD PRESSURE: 74 MMHG | TEMPERATURE: 97.8 F

## 2023-07-14 DIAGNOSIS — Z13.0 SCREENING FOR DEFICIENCY ANEMIA: ICD-10-CM

## 2023-07-14 DIAGNOSIS — R41.3 MEMORY LOSS: ICD-10-CM

## 2023-07-14 DIAGNOSIS — F41.9 ANXIETY: ICD-10-CM

## 2023-07-14 DIAGNOSIS — E66.01 MORBID OBESITY (H): ICD-10-CM

## 2023-07-14 DIAGNOSIS — Z13.220 SCREENING CHOLESTEROL LEVEL: ICD-10-CM

## 2023-07-14 DIAGNOSIS — I10 ESSENTIAL HYPERTENSION: ICD-10-CM

## 2023-07-14 DIAGNOSIS — Z00.00 ENCOUNTER FOR MEDICARE ANNUAL WELLNESS EXAM: Primary | ICD-10-CM

## 2023-07-14 DIAGNOSIS — F33.41 RECURRENT MAJOR DEPRESSIVE DISORDER, IN PARTIAL REMISSION (H): ICD-10-CM

## 2023-07-14 DIAGNOSIS — I10 BENIGN ESSENTIAL HYPERTENSION: ICD-10-CM

## 2023-07-14 LAB
ALBUMIN SERPL BCG-MCNC: 4.5 G/DL (ref 3.5–5.2)
ALP SERPL-CCNC: 90 U/L (ref 35–104)
ALT SERPL W P-5'-P-CCNC: 16 U/L (ref 0–50)
ANION GAP SERPL CALCULATED.3IONS-SCNC: 14 MMOL/L (ref 7–15)
AST SERPL W P-5'-P-CCNC: 23 U/L (ref 0–45)
BILIRUB SERPL-MCNC: 0.4 MG/DL
BUN SERPL-MCNC: 12.2 MG/DL (ref 8–23)
CALCIUM SERPL-MCNC: 9.8 MG/DL (ref 8.8–10.2)
CHLORIDE SERPL-SCNC: 101 MMOL/L (ref 98–107)
CHOLEST SERPL-MCNC: 252 MG/DL
CREAT SERPL-MCNC: 0.78 MG/DL (ref 0.51–0.95)
DEPRECATED HCO3 PLAS-SCNC: 25 MMOL/L (ref 22–29)
ERYTHROCYTE [DISTWIDTH] IN BLOOD BY AUTOMATED COUNT: 14 % (ref 10–15)
GFR SERPL CREATININE-BSD FRML MDRD: 76 ML/MIN/1.73M2
GLUCOSE SERPL-MCNC: 91 MG/DL (ref 70–99)
HCT VFR BLD AUTO: 38.7 % (ref 35–47)
HDLC SERPL-MCNC: 80 MG/DL
HGB BLD-MCNC: 12.6 G/DL (ref 11.7–15.7)
LDLC SERPL CALC-MCNC: 147 MG/DL
MCH RBC QN AUTO: 29.9 PG (ref 26.5–33)
MCHC RBC AUTO-ENTMCNC: 32.6 G/DL (ref 31.5–36.5)
MCV RBC AUTO: 92 FL (ref 78–100)
NONHDLC SERPL-MCNC: 172 MG/DL
PLATELET # BLD AUTO: 276 10E3/UL (ref 150–450)
POTASSIUM SERPL-SCNC: 4.4 MMOL/L (ref 3.4–5.3)
PROT SERPL-MCNC: 7.2 G/DL (ref 6.4–8.3)
RBC # BLD AUTO: 4.22 10E6/UL (ref 3.8–5.2)
SODIUM SERPL-SCNC: 140 MMOL/L (ref 136–145)
TRIGL SERPL-MCNC: 125 MG/DL
WBC # BLD AUTO: 4.3 10E3/UL (ref 4–11)

## 2023-07-14 PROCEDURE — 80053 COMPREHEN METABOLIC PANEL: CPT | Performed by: NURSE PRACTITIONER

## 2023-07-14 PROCEDURE — 99214 OFFICE O/P EST MOD 30 MIN: CPT | Mod: 25 | Performed by: NURSE PRACTITIONER

## 2023-07-14 PROCEDURE — 85027 COMPLETE CBC AUTOMATED: CPT | Performed by: NURSE PRACTITIONER

## 2023-07-14 PROCEDURE — 36415 COLL VENOUS BLD VENIPUNCTURE: CPT | Performed by: NURSE PRACTITIONER

## 2023-07-14 PROCEDURE — G0439 PPPS, SUBSEQ VISIT: HCPCS | Performed by: NURSE PRACTITIONER

## 2023-07-14 PROCEDURE — 80061 LIPID PANEL: CPT | Performed by: NURSE PRACTITIONER

## 2023-07-14 RX ORDER — AMLODIPINE BESYLATE 2.5 MG/1
2.5 TABLET ORAL DAILY
Qty: 180 TABLET | Refills: 1 | Status: SHIPPED | OUTPATIENT
Start: 2023-07-14 | End: 2024-01-25

## 2023-07-14 RX ORDER — LISINOPRIL 40 MG/1
40 TABLET ORAL DAILY
Qty: 90 TABLET | Refills: 3 | Status: SHIPPED | OUTPATIENT
Start: 2023-07-14 | End: 2024-02-05

## 2023-07-14 RX ORDER — ESCITALOPRAM OXALATE 20 MG/1
20 TABLET ORAL DAILY
Qty: 90 TABLET | Refills: 3 | Status: SHIPPED | OUTPATIENT
Start: 2023-07-14 | End: 2023-11-24

## 2023-07-14 ASSESSMENT — ENCOUNTER SYMPTOMS
HEARTBURN: 0
DIZZINESS: 0
FREQUENCY: 0
PARESTHESIAS: 0
CONSTIPATION: 0
NAUSEA: 0
FEVER: 0
BREAST MASS: 0
CHILLS: 0
MYALGIAS: 0
PALPITATIONS: 0
HEADACHES: 0
WEAKNESS: 0
HEMATOCHEZIA: 0
DYSURIA: 0
SORE THROAT: 0
JOINT SWELLING: 0
NERVOUS/ANXIOUS: 1
SHORTNESS OF BREATH: 0
COUGH: 0
ABDOMINAL PAIN: 0
EYE PAIN: 0
ARTHRALGIAS: 0
HEMATURIA: 0
DIARRHEA: 0

## 2023-07-14 ASSESSMENT — PATIENT HEALTH QUESTIONNAIRE - PHQ9
SUM OF ALL RESPONSES TO PHQ QUESTIONS 1-9: 2
10. IF YOU CHECKED OFF ANY PROBLEMS, HOW DIFFICULT HAVE THESE PROBLEMS MADE IT FOR YOU TO DO YOUR WORK, TAKE CARE OF THINGS AT HOME, OR GET ALONG WITH OTHER PEOPLE: NOT DIFFICULT AT ALL
SUM OF ALL RESPONSES TO PHQ QUESTIONS 1-9: 2

## 2023-07-14 ASSESSMENT — ANXIETY QUESTIONNAIRES
GAD7 TOTAL SCORE: 5
3. WORRYING TOO MUCH ABOUT DIFFERENT THINGS: SEVERAL DAYS
IF YOU CHECKED OFF ANY PROBLEMS ON THIS QUESTIONNAIRE, HOW DIFFICULT HAVE THESE PROBLEMS MADE IT FOR YOU TO DO YOUR WORK, TAKE CARE OF THINGS AT HOME, OR GET ALONG WITH OTHER PEOPLE: NOT DIFFICULT AT ALL
7. FEELING AFRAID AS IF SOMETHING AWFUL MIGHT HAPPEN: SEVERAL DAYS
GAD7 TOTAL SCORE: 5
2. NOT BEING ABLE TO STOP OR CONTROL WORRYING: SEVERAL DAYS
1. FEELING NERVOUS, ANXIOUS, OR ON EDGE: SEVERAL DAYS
4. TROUBLE RELAXING: SEVERAL DAYS
5. BEING SO RESTLESS THAT IT IS HARD TO SIT STILL: NOT AT ALL
6. BECOMING EASILY ANNOYED OR IRRITABLE: NOT AT ALL

## 2023-07-14 ASSESSMENT — ACTIVITIES OF DAILY LIVING (ADL)
CURRENT_FUNCTION: MONEY MANAGEMENT REQUIRES ASSISTANCE
CURRENT_FUNCTION: HOUSEWORK REQUIRES ASSISTANCE

## 2023-07-14 ASSESSMENT — ASTHMA QUESTIONNAIRES: ACT_TOTALSCORE: 24

## 2023-07-14 NOTE — PATIENT INSTRUCTIONS
Patient Education   Personalized Prevention Plan  You are due for the preventive services outlined below.  Your care team is available to assist you in scheduling these services.  If you have already completed any of these items, please share that information with your care team to update in your medical record.  Health Maintenance Due   Topic Date Due     Asthma Action Plan - yearly  Never done     Depression Action Plan  Never done     Diptheria Tetanus Pertussis (DTAP/TDAP/TD) Vaccine (1 - Tdap) Never done     Zoster (Shingles) Vaccine (1 of 2) Never done     COVID-19 Vaccine (5 - Pfizer series) 01/30/2023       Exercise for a Healthier Heart  You may wonder how you can improve the health of your heart. If you re thinking about exercise, you re on the right track. You don t need to become an athlete. But you do need a certain amount of brisk exercise to help strengthen your heart. If you have been diagnosed with a heart condition, your healthcare provider may advise exercise to help your condition. To help make exercise a habit, choose safe, fun activities.      Exercise with a friend. When activity is fun, you're more likely to stick with it.     Before you start  Check with your healthcare provider before starting an exercise program. This is especially important if you haven't been active for a while. It's also important if you have a long-term (chronic) health problem such as heart disease, diabetes, or obesity. Also check with your provider if you're at high risk for having these problems.   Why exercise?  Exercising regularly offers many healthy rewards. It can help you do all of these:     Improve your blood cholesterol level to help prevent further heart trouble.    Lower your blood pressure to help prevent a stroke or heart attack.    Control diabetes or reduce your risk of getting this disease.    Improve your heart and lung function.    Reach and stay at a healthy weight.    Make your muscles  stronger so you can stay active.    Prevent falls and fractures by slowing the loss of bone mass (osteoporosis).    Manage stress better.    Improve your sense of self and your body image.  Exercise tips      Ease into your routine. Set small goals. Then build on them. Talk with your healthcare provider first before starting an exercise routine if you're not sure what your activity level should be.    Exercise on most days. Aim for a total of at least 150 minutes (2 hours and 30 minutes) or more of moderate-intensity aerobic activity each week. You could also do 75 minutes (1 hour and 15 minutes) or more of vigorous-intensity aerobic activity each week. Or try for a combination of both. Moderate activity means that you breathe heavier and your heart rate increases, but you can still talk. Think about doing at least 30 minutes of moderate exercise, 5 times a week. It's OK to work up to the 30-minute period over time. Examples of moderate-intensity activity are brisk walking, gardening, and water aerobics.    Step up your daily activity level.  Along with your exercise program, try being more active the whole day. Walk instead of drive. Or park further away so that you take more steps each day. Do more household tasks or yard work. You may not be able to meet the advised amount of physical activity. But doing some moderate- or vigorous-intensity aerobic activity can help reduce your risk for heart disease. Your healthcare provider can help you figure out what is best for you.    Choose 1 or more activities you enjoy.  Walking is one of the easiest things you can do. You can also try swimming, riding a bike, dancing, or taking an exercise class.    Call 911  Call 911 right away if any of these occur:     Chest pain that doesn't go away quickly with rest    New burning, tightness, pressure, or heaviness in your chest, neck, shoulders, back, or arms    Abnormal or severe shortness of breath    A very fast or irregular  heartbeat (palpitations)    Fainting  When to call your healthcare provider  Call your healthcare provider if you have any of these:     Dizziness or lightheadedness    Mild shortness of breath or chest pain    Increased or new joint or muscle pain    [place] last reviewed this educational content on 7/1/2022 2000-2023 [employer]. All rights reserved. This information is not intended as a substitute for professional medical care. Always follow your healthcare professional's instructions.        Activities of Daily Living    Your Health Risk Assessment indicates you have difficulties with activities of daily living such as housework, bathing, preparing meals, taking medication, etc. Please make a follow up appointment for us to address this issue in more detail.    Signs of Hearing Loss  Hearing loss is a problem shared by many people. In fact, it's one of the most common health problems, particularly as people age. Most people aged 65 and older have some hearing loss. By age 80, almost everyone does. Hearing loss often occurs slowly over the years. So, you may not realize your hearing has gotten worse.   When sudden hearing loss occurs, it's important to contact your healthcare provider right away. Your provider will do a medical exam and a hearing exam as soon as possible. This is to help find the cause and type of your sudden hearing loss. Based on your diagnosis, your healthcare provider will discuss possible treatments.      Hearing much better with one ear can be a sign of hearing loss.     Have your hearing checked  Call your healthcare provider if you:     Have to strain to hear normal conversation    Have to watch other people s faces very carefully to follow what they re saying    Need to ask people to repeat what they ve said    Often misunderstand what people are saying    Turn the volume of the television or radio up so high that others complain    Feel that people are mumbling when  they re talking to you    Find that the effort to hear leaves you feeling tired and irritated    Notice, when using the phone, that you hear better with one ear than the other  [place] last reviewed this educational content on 6/1/2022 2000-2023 [employer]. All rights reserved. This information is not intended as a substitute for professional medical care. Always follow your healthcare professional's instructions.

## 2023-07-14 NOTE — PROGRESS NOTES
"SUBJECTIVE:   Cami is a 78 year old who presents for Preventive Visit.      7/14/2023     9:14 AM   Additional Questions   Roomed by Mervat FARIA CMA     Are you in the first 12 months of your Medicare coverage?  No    Healthy Habits:     In general, how would you rate your overall health?  Good    Frequency of exercise:  1 day/week    Duration of exercise:  Less than 15 minutes    Do you usually eat at least 4 servings of fruit and vegetables a day, include whole grains    & fiber and avoid regularly eating high fat or \"junk\" foods?  Yes    Taking medications regularly:  Yes    Medication side effects:  None    Ability to successfully perform activities of daily living:  Housework requires assistance and money management requires assistance    Home Safety:  Throw rugs in the hallway    Hearing Impairment:  Need to ask people to speak up or repeat themselves and no hearing concerns    In the past 6 months, have you been bothered by leaking of urine?  No    In general, how would you rate your overall mental or emotional health?  Good    Additional concerns today:  No        Have you ever done Advance Care Planning? (For example, a Health Directive, POLST, or a discussion with a medical provider or your loved ones about your wishes): Yes, patient states has an Advance Care Planning document and will bring a copy to the clinic.       Fall risk  Fallen 2 or more times in the past year?: No  Any fall with injury in the past year?: No    Cognitive Screening   1) Repeat 3 items (Leader, Season, Table)    2) Clock draw: NORMAL  3) 3 item recall: Recalls 2 objects   Results: 3 items recalled: COGNITIVE IMPAIRMENT LESS LIKELY    Mini-CogTM Copyright DOMINICK Correia. Licensed by the author for use in [facility]; reprinted with permission (hope@.[facility]). All rights reserved.      Do you have sleep apnea, excessive snoring or daytime drowsiness?: no    Reviewed and updated as needed this visit by clinical staff   Tobacco  " Allergies  Meds  Problems  Med Hx  Surg Hx  Fam Hx          Reviewed and updated as needed this visit by Provider                 Social History     Tobacco Use     Smoking status: Never     Smokeless tobacco: Never   Substance Use Topics     Alcohol use: Yes     Comment: 3 drinks per week             7/14/2023     9:11 AM   Alcohol Use   Prescreen: >3 drinks/day or >7 drinks/week? No     Do you have a current opioid prescription? No  Do you use any other controlled substances or medications that are not prescribed by a provider? None        Hypertension Follow-up      Do you check your blood pressure regularly outside of the clinic? No     Are you following a low salt diet? No    Are your blood pressures ever more than 140 on the top number (systolic) OR more   than 90 on the bottom number (diastolic), for example 140/90? Yes    Depression Followup    How are you doing with your depression since your last visit? Same  was in memory care bothered patient a little.    Are you having other symptoms that might be associated with depression? No    Have you had a significant life event?  OTHER:  in memory care.     Are you feeling anxious or having panic attacks?   No    Do you have any concerns with your use of alcohol or other drugs? No    Anxiety worse since  in memory care. Has been using more lorazepam than previous which we had discussed is too often. Previously filling 10 tabs every few months. Open to adjusting daily dose Lexapro.     Social History     Tobacco Use     Smoking status: Never     Smokeless tobacco: Never   Substance Use Topics     Alcohol use: Yes     Comment: 3 drinks per week     Drug use: Never         7/1/2022    10:05 AM 7/12/2022     8:47 AM 7/14/2023     9:04 AM   PHQ   PHQ-9 Total Score 0 0 2   Q9: Thoughts of better off dead/self-harm past 2 weeks Not at all Not at all Not at all         7/6/2020    11:00 AM 7/14/2023     9:06 AM   TYLER-7 SCORE   Total Score  5  (mild anxiety)   Total Score 7 5         7/14/2023     9:04 AM   Last PHQ-9   1.  Little interest or pleasure in doing things 0   2.  Feeling down, depressed, or hopeless 0   3.  Trouble falling or staying asleep, or sleeping too much 1   4.  Feeling tired or having little energy 1   5.  Poor appetite or overeating 0   6.  Feeling bad about yourself 0   7.  Trouble concentrating 0   8.  Moving slowly or restless 0   Q9: Thoughts of better off dead/self-harm past 2 weeks 0   PHQ-9 Total Score 2         7/14/2023     9:06 AM   TYLER-7    1. Feeling nervous, anxious, or on edge 1   2. Not being able to stop or control worrying 1   3. Worrying too much about different things 1   4. Trouble relaxing 1   5. Being so restless that it is hard to sit still 0   6. Becoming easily annoyed or irritable 0   7. Feeling afraid, as if something awful might happen 1   TYLER-7 Total Score 5   If you checked any problems, how difficult have they made it for you to do your work, take care of things at home, or get along with other people? Not difficult at all       Current providers sharing in care for this patient include:   Patient Care Team:  Tisha Trevino CNP as PCP - General (Nurse Practitioner - Family)  Tisha Trevino CNP as Assigned PCP  Ree Arzola PA-C as Assigned Surgical Provider  [facility]  Gilson Casarez MD as Assigned Neuroscience Provider    The following health maintenance items are reviewed in Epic and correct as of today:  Health Maintenance   Topic Date Due     ASTHMA ACTION PLAN  Never done     DEPRESSION ACTION PLAN  Never done     DTAP/TDAP/TD IMMUNIZATION (1 - Tdap) Never done     ZOSTER IMMUNIZATION (1 of 2) Never done     INFLUENZA VACCINE (1) 09/01/2023     ASTHMA CONTROL TEST  01/14/2024     PHQ-9  01/14/2024     MEDICARE ANNUAL WELLNESS VISIT  07/14/2024     ANNUAL REVIEW OF HM ORDERS  07/14/2024     FALL RISK ASSESSMENT  07/14/2024     LIPID   "10/14/2027     ADVANCE CARE PLANNING  07/14/2028     Pneumococcal Vaccine: 65+ Years  Completed     IPV IMMUNIZATION  Aged Out     MENINGITIS IMMUNIZATION  Aged Out     DEXA  Discontinued     COVID-19 Vaccine  Discontinued     Lab work is in process  Labs reviewed in EPIC      Mammogram Screening - Mammography discussed and declined  Pertinent mammograms are reviewed under the imaging tab.    Review of Systems   Constitutional: Negative for chills and fever.   HENT: Negative for congestion, ear pain, hearing loss and sore throat.    Eyes: Negative for pain and visual disturbance.   Respiratory: Negative for cough and shortness of breath.    Cardiovascular: Negative for chest pain, palpitations and peripheral edema.   Gastrointestinal: Negative for abdominal pain, constipation, diarrhea, heartburn, hematochezia and nausea.   Breasts:  Negative for tenderness, breast mass and discharge.   Genitourinary: Negative for dysuria, frequency, genital sores, hematuria, pelvic pain, urgency, vaginal bleeding and vaginal discharge.   Musculoskeletal: Negative for arthralgias, joint swelling and myalgias.   Skin: Negative for rash.   Neurological: Negative for dizziness, weakness, headaches and paresthesias.   Psychiatric/Behavioral: Negative for mood changes. The patient is nervous/anxious.        OBJECTIVE:   /74   Pulse 85   Temp 97.8  F (36.6  C) (Tympanic)   Resp 18   Ht 1.549 m (5' 1\")   Wt 83.5 kg (184 lb)   LMP  (LMP Unknown)   SpO2 95%   BMI 34.77 kg/m   Estimated body mass index is 34.77 kg/m  as calculated from the following:    Height as of this encounter: 1.549 m (5' 1\").    Weight as of this encounter: 83.5 kg (184 lb).  Physical Exam  GENERAL: healthy, alert and no distress  EYES: Eyes grossly normal to inspection, PERRL and conjunctivae and sclerae normal  HENT: ear canals and TM's normal, nose and mouth without ulcers or lesions  NECK: no adenopathy, no asymmetry, masses, or scars and thyroid " normal to palpation  RESP: lungs clear to auscultation - no rales, rhonchi or wheezes  CV: regular rate and rhythm, normal S1 S2, no S3 or S4, no murmur, click or rub, no peripheral edema and peripheral pulses strong  ABDOMEN: soft, nontender, no hepatosplenomegaly, no masses and bowel sounds normal  MS: no gross musculoskeletal defects noted, no edema  SKIN: no suspicious lesions or rashes  NEURO: Normal strength and tone, mentation intact and speech normal  PSYCH: mentation appears normal, affect normal/bright    Diagnostic Test Results:  Labs reviewed in Epic    ASSESSMENT / PLAN:   Cami was seen today for physical.    Diagnoses and all orders for this visit:    Encounter for Medicare annual wellness exam  -     REVIEW OF HEALTH MAINTENANCE PROTOCOL ORDERS    Recurrent major depressive disorder, in partial remission (H)  Stable on lexapro.    Anxiety  Increase to 20 mg daily. Follow up if any ill effects. Discussed use of lorazepam maximum 15 tablets total per month for now. With reduced use over the next couple months back to baseline. Need to find other treatment if anxiety continues to be increased.   -     escitalopram (LEXAPRO) 20 MG tablet; Take 1 tablet (20 mg) by mouth daily    Morbid obesity (H)  Continue to work on weight loss which will contribute to improved management of HTN      Essential hypertension  -     Comprehensive metabolic panel (BMP + Alb, Alk Phos, ALT, AST, Total. Bili, TP); Future  -     lisinopril (ZESTRIL) 40 MG tablet; Take 1 tablet (40 mg) by mouth daily  -     amLODIPine (NORVASC) 2.5 MG tablet; Take 1 tablet (2.5 mg) by mouth daily  -     Comprehensive metabolic panel (BMP + Alb, Alk Phos, ALT, AST, Total. Bili, TP)    Screening cholesterol level  -     Lipid panel reflex to direct LDL Fasting; Future  -     Lipid panel reflex to direct LDL Fasting    Screening for deficiency anemia  -     CBC with platelets; Future  -     CBC with platelets    Memory loss  Labs from neurology.  -  "    Folate  -     Methylmalonic Acid  -     TSH with free T4 reflex  -     Vitamin B1 whole blood  -     Vitamin B12  -     Vitamin B6    Benign essential hypertension  -     lisinopril (ZESTRIL) 40 MG tablet; Take 1 tablet (40 mg) by mouth daily    Other orders  -     PRIMARY CARE FOLLOW-UP SCHEDULING; Future  -     REVIEW OF HEALTH MAINTENANCE PROTOCOL ORDERS        Patient has been advised of split billing requirements and indicates understanding: Yes      COUNSELING:  Reviewed preventive health counseling, as reflected in patient instructions  Special attention given to:       Regular exercise       Healthy diet/nutrition       Bladder control       Fall risk prevention      BMI:   Estimated body mass index is 34.77 kg/m  as calculated from the following:    Height as of this encounter: 1.549 m (5' 1\").    Weight as of this encounter: 83.5 kg (184 lb).   Weight management plan: Discussed healthy diet and exercise guidelines      She reports that she has never smoked. She has never used smokeless tobacco.      Appropriate preventive services were discussed with this patient, including applicable screening as appropriate for cardiovascular disease, diabetes, osteopenia/osteoporosis, and glaucoma.  As appropriate for age/gender, discussed screening for colorectal cancer, prostate cancer, breast cancer, and cervical cancer. Checklist reviewing preventive services available has been given to the patient.    Reviewed patients plan of care and provided an AVS. The Intermediate Care Plan ( asthma action plan, low back pain action plan, and migraine action plan) for Cami meets the Care Plan requirement. This Care Plan has been established and reviewed with the Patient.      Tisha Trevino [facility] [place]    Identified Health Risks:    I have reviewed Opioid Use Disorder and Substance Use Disorder risk factors and made any needed referrals.     Answers for HPI/ROS submitted by the patient on " 7/14/2023  If you checked off any problems, how difficult have these problems made it for you to do your work, take care of things at home, or get along with other people?: Not difficult at all  PHQ9 TOTAL SCORE: 2  TYLER 7 TOTAL SCORE: 5

## 2023-07-14 NOTE — PROGRESS NOTES
She is at risk for lack of exercise and has been provided with information to increase physical activity for the benefit of her well-being.  The patient reports that she has difficulty with activities of daily living. I have asked that the patient make a follow up appointment in *** weeks where this issue will be further evaluated and addressed.  The patient was provided with written information regarding signs of hearing loss.

## 2023-09-08 DIAGNOSIS — G47.00 INSOMNIA, UNSPECIFIED TYPE: ICD-10-CM

## 2023-09-08 DIAGNOSIS — F41.9 ANXIETY: ICD-10-CM

## 2023-09-11 RX ORDER — ZOLPIDEM TARTRATE 5 MG/1
5 TABLET ORAL
Qty: 10 TABLET | Refills: 0 | Status: ON HOLD | OUTPATIENT
Start: 2023-09-11 | End: 2023-11-26

## 2023-09-11 RX ORDER — LORAZEPAM 0.5 MG/1
TABLET ORAL
Qty: 10 TABLET | Refills: 0 | Status: SHIPPED | OUTPATIENT
Start: 2023-09-11 | End: 2023-11-24

## 2023-09-28 ENCOUNTER — IMMUNIZATION (OUTPATIENT)
Dept: FAMILY MEDICINE | Facility: CLINIC | Age: 78
End: 2023-09-28
Payer: MEDICARE

## 2023-09-28 DIAGNOSIS — Z23 NEED FOR VACCINATION: Primary | ICD-10-CM

## 2023-09-28 PROCEDURE — 90480 ADMN SARSCOV2 VAC 1/ONLY CMP: CPT

## 2023-09-28 PROCEDURE — G0008 ADMIN INFLUENZA VIRUS VAC: HCPCS

## 2023-09-28 PROCEDURE — 91320 SARSCV2 VAC 30MCG TRS-SUC IM: CPT

## 2023-09-28 PROCEDURE — 90662 IIV NO PRSV INCREASED AG IM: CPT

## 2023-09-28 NOTE — PROGRESS NOTES
Prior to immunization administration, verified patients identity using patient s name and date of birth. Please see Immunization Activity for additional information.     Screening Questionnaire for Adult Immunization    Are you sick today?   No   Do you have allergies to medications, food, a vaccine component or latex?   No   Have you ever had a serious reaction after receiving a vaccination?   No   Do you have a long-term health problem with heart, lung, kidney, or metabolic disease (e.g., diabetes), asthma, a blood disorder, no spleen, complement component deficiency, a cochlear implant, or a spinal fluid leak?  Are you on long-term aspirin therapy?   No   Do you have cancer, leukemia, HIV/AIDS, or any other immune system problem?   No   Do you have a parent, brother, or sister with an immune system problem?   No   In the past 3 months, have you taken medications that affect  your immune system, such as prednisone, other steroids, or anticancer drugs; drugs for the treatment of rheumatoid arthritis, Crohn s disease, or psoriasis; or have you had radiation treatments?   Don't Know   Have you had a seizure, or a brain or other nervous system problem?   No   During the past year, have you received a transfusion of blood or blood    products, or been given immune (gamma) globulin or antiviral drug?   No   For women: Are you pregnant or is there a chance you could become       pregnant during the next month?   No   Have you received any vaccinations in the past 4 weeks?   No     Immunization questionnaire answers were all negative.    I have reviewed the following standing orders:   This patient is due and qualifies for the Covid-19 vaccine.  And flu    Click here for COVID-19 Standing Order    I have reviewed the vaccines inclusion and exclusion criteria; No concerns regarding eligibility.     Patient instructed to remain in clinic for 15 minutes afterwards, and to report any adverse reactions.     Screening performed  by Tisha Crespo CMA on 9/28/2023 at 10:31 AM.

## 2023-10-17 DIAGNOSIS — F41.9 ANXIETY: ICD-10-CM

## 2023-10-19 NOTE — TELEPHONE ENCOUNTER
Too soon to fill. Can't fill until 10/27/23. Given seeing neurology for memory I would like to discontinue this medication. If she needs more anxiety coverage we can adjust the lexapro.    Tisha Trevino, CNP

## 2023-10-26 NOTE — TELEPHONE ENCOUNTER
Patient called and was informed of the note.     Patient is ok with Lexpro adjustment.     Patient states understanding.

## 2023-10-26 NOTE — TELEPHONE ENCOUNTER
Attempt # 1    Called # [phone]     Left a non detailed VM to call back at [phone] and ask for any available Triage Nurse.    MORA CAMERON RN on 10/26/2023 at 1:48 PM   [facility]

## 2023-10-27 RX ORDER — ESCITALOPRAM OXALATE 20 MG/1
30 TABLET ORAL DAILY
Qty: 135 TABLET | Refills: 1 | Status: SHIPPED | OUTPATIENT
Start: 2023-10-27 | End: 2024-02-05

## 2023-10-27 RX ORDER — LORAZEPAM 0.5 MG/1
TABLET ORAL
Qty: 10 TABLET | OUTPATIENT
Start: 2023-10-27

## 2023-11-20 ENCOUNTER — OFFICE VISIT (OUTPATIENT)
Dept: NEUROPSYCHOLOGY | Facility: CLINIC | Age: 78
End: 2023-11-20
Attending: PSYCHIATRY & NEUROLOGY
Payer: MEDICARE

## 2023-11-20 DIAGNOSIS — G31.84 MILD COGNITIVE IMPAIRMENT: Primary | ICD-10-CM

## 2023-11-20 PROCEDURE — 96132 NRPSYC TST EVAL PHYS/QHP 1ST: CPT

## 2023-11-20 PROCEDURE — 96133 NRPSYC TST EVAL PHYS/QHP EA: CPT

## 2023-11-20 PROCEDURE — 96139 PSYCL/NRPSYC TST TECH EA: CPT

## 2023-11-20 PROCEDURE — 90791 PSYCH DIAGNOSTIC EVALUATION: CPT

## 2023-11-20 PROCEDURE — 96138 PSYCL/NRPSYC TECH 1ST: CPT

## 2023-11-20 NOTE — LETTER
2023       RE: Cami Turner  [address]  [facility] [address]     Dear Colleague,    Thank you for referring your patient, Cami Turner, to the [facility] NEUROPSYCHOLOGY [place] at [facility]. Please see a copy of my visit note below.    NEUROPSYCHOLOGICAL EVALUATION  **CONFIDENTIAL**    **This is a medical document intended for communication with the referring provider. It is written in medical language and may contain abbreviations or verbiage that are unfamiliar. It may appear blunt or direct. This report and the results within are not intended to be interpreted in isolation without consultation with your medical provider. **      Name: Cami Turner Education: 12 years    (age): 1945 (78 years) MOODY:  2023   Referral: Gilson Casarez MD  Department of Neurology Handedness:  MRN (Epic): Right  [id number]     IDENTIFYING INFORMATION AND REASON FOR REFERRAL   Ms. Turner is an 78-year-old, right-handed, White female with a history including hypertension and depression. This evaluation was requested to provide a comprehensive assessment of her current neuropsychological status to inform treatment planning.     SUMMARY & IMPRESSION  See below for relevant background information and detailed test results. See separate abstract for supporting documentation including a list of neuropsychological measures and test scores.    Ms. Turner s current neurocognitive profile revealed impaired performance on tests of mental flexibility, letter-cue verbal fluency, and visual memory retrieval. Variability was noted across verbal memory tests with best performance within normal limits. Working memory scores were also variable. Performance was within expectation across other cognitive tests including immediate auditory attention, speeded processing, naming, semantic verbal fluency, visuospatial processing, abstract reasoning,  cognitive inhibition, planning/organization, and knowledge of health and safety. Assessment of current psychological and emotional status revealed the absence of clinically significant depressive mood symptoms and anxiety.    Overall, Ms. Turner exhibited impaired performance on select tests of memory retrieval and executive functioning. The etiology of her cognitive difficulties remains to be specified. Her medical history is relatively unremarkable for cerebrovascular risk factors; however, her neurocognitive profile is not strongly suggestive of an underlying neurodegenerative process, though this may best be evaluated by ongoing monitoring of her cognitive status over time. Low mood and stress may exacerbate any mild cognitive difficulties but would be unlikely to fully explain the extent of difficulties observed. While findings suggest Ms. Turner may experience subtle cognitive inefficiencies in her day-to-day life, she and her daughter reported limited functional difficulties at present. Taken together, observed test performance and reported levels of day-to-day functioning suggest a diagnosis of mild cognitive impairment (also known as mild neurocognitive disorder). Importantly, she has several cognitive strengths that will be beneficial to her in order to compensate for any cognitive weaknesses.    DIAGNOSTIC IMPRESSIONS (ICD-10)  ? Mild cognitive impairment (G31.84)    RECOMMENDATIONS  1. Results from the present evaluation revealed memory impairment and subtle executive dysfunction. It is recommended that trusted family provide periodic oversight and monitoring for complex activities of daily living to ensure her safety and wellbeing are maintained.    2. Ms. Turner is encouraged to live a healthy lifestyle that promotes brain health including getting appropriate exercise, as advised by her healthcare providers, and eating healthy.    3. It is recommended that Ms. Turner continue to maintain a socially  active lifestyle. She is likely to benefit from continuing to engage in social activities that she enjoys as these activities may offer cognitive stimulation and provide emotional benefits that will maximize quality of life.   4. Ms. Turner is encouraged to utilize the following behavioral strategies to maximize cognitive functioning in her daily life:   -Eliminate distraction. Eliminating environmental distracters can facilitate attention and memory performance. For example, turning off music or the television while having a conversation, so that all of your attention is focused on the task at hand.  -Engage in calming activities that increase focus on the present. Incorporating mindfulness techniques such as meditation, relaxation, yoga, and moderate cardiovascular exercise, into your daily routine will help keep you present-oriented and consequently improve attention and memory. Apps like Calm, and Kuli Kuli, have guided meditations and mindfulness tools (search  mindfulness meditation ).  -Pay mindful attention. You may find that you need to make a conscious effort to pay attention to conversations or when learning new information. When attention is not focused, it is difficult for the brain to learn new information. Thus, making a mindful effort to pay attention as you go through daily tasks will assist and facilitate memory recall later on.  -Use external aids to increase structure in daily life. Ongoing use of compensatory strategies such as notes, lists, and a centralized calendar are supported. Tools such as smart phones with alarms and reminders are helpful in bringing structure to daily activities.   -Compartmentalize problem solving.  Executive function difficulties may interfere with your ability to make decisions and reason through complex and/or abstract situations in daily life. Break large overwhelming decisions into small manageable problems, and do not go on to the next step before accomplishing the  previous one. Use written outlines to track your progress.   -Allow for time. Take the time needed to learn and recall information. Some people tend to worry if they can't immediately recall something. Instead, you should take time to express a thought, or complete a task rather than be critical or harsh on yourself.  5. The current evaluation will serve as an objective cognitive baseline against which results of future evaluations may be compared.  Ms. Turner may be referred for a follow-up neuropsychological evaluation in 12 months to objectively assess neurocognitive change.     Thank you for the opportunity to participate in Ms. Turner s care.  These findings and recommendations were reviewed with the patient and her daughter immediately following this evaluation on 11/20/2023 and all their questions were answered. If you have any questions regarding this evaluation, please do not hesitate to contact me.       Venecia Dutta, Ph.D.,   Clinical Neuropsychologist    RELEVANT BACKGROUND INFORMATION AND SUPPORTING DOCUMENTATION  Gathered from a clinical interview with the patient and her daughter, and reviews of electronic medical record.     History of Presenting Problem(s)  Ms. Turner presented with a history of hypertension and depression. She reported noting occasional word-finding difficulties and trouble recalling names for the past 2-3 months, but she denied other cognitive complaints. Her daughter noted memory changes in the past year or longer characterized by repeating stories, forgetting conversations, and occasional word finding difficulties. She gave an example of recently forgetting what she ordered at a restaurant when the food arrived. She also noted needing more support with making decisions and planning, but she denied other cognitive complaints. Her daughter also noted that since Ms. 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 Ms. Turner's son has been overseeing her finances for the last 1-2 years as she was missing bills. Per EMR she almost lost her car and life insurance; however, her daughter noted that she continues to manage the day-to-day finances and double checks her statements, though most things are on autopay. Ms. Turner independently manages her medications with the use of a pill box and denied ever forgetting to take her pills. She noted more trouble with multitasking when preparing a complex meal but denied difficulties with simple meal preparation. She only drives close by her home and during the day. Her daughter denied any concerns about her managing her medications, meal preparations, driving, and her basic self-care. She continues to receive caregiver assistance 1 day per week, which was initially put in place for her  s care, but this caregiver assists around the home and helps drive her to appointments when needed. Physically, she fell in October 2022 as a result of leg weakness, but they denied any falls since that time. Her daughter noted slowed gait and she has been using a cane for ambulation in the past 6 months. They denied other physical complaints or sensorimotor disturbances. Emotionally, she stated her mood is  okay , though she noted feeling down and sad in the context of her  in memory care. Her history is notable for depression, and she has been on Lexapro for at least 5-6 years. Her daughter also noted a history of anxiety in the past but stated that her anxiety is well controlled now and neither depression nor anxiety symptoms are impacting her daily functioning. They denied any behavioral or personality changes or concerns.    Neurodiagnostic Findings   ? Brief cognitive screening measure administered during neurology visit with   Leida (6/7/2023): MoCA= 20/30  ? Brain MRI w/wo contrast (10/13/2022) IMPRESSION: 1.  No acute intracranial process identified. 2.  Unchanged ventriculomegaly disproportionate to the degree of sulcal prominence and other findings that raise concern for the possibility of chronic communicating/normal pressure hydrocephalus. Recommend clinical correlation. The degree of ventriculomegaly is unchanged from the prior CT. 3.  Brain parenchymal volume loss and presumed chronic small vessel ischemic changes, as described. 4.  Patchy nonspecific T2 hyperintense marrow signal and enhancement involving the right mandibular condyle in the region of the right temporomandibular joint. This may be reactive to degenerative arthropathy. Recommend clinical correlation.   ? Head CT w/o contrast (6/5/2021) IMPRESSION:   1.  No discrete mass lesion, hemorrhage or focal area suggestive of acute infarct. 2.  Mild diffuse small vessel ischemic disease. 3.  Ventriculomegaly suggestive of normal pressure hydrocephalus. Recommend clinical correlation.  ? Per [facility] neurology note dated 6/7/2023: We reviewed her brain MRI scans, which demonstrated mild ventriculomegaly that in my opinion would correlate with the degree of atrophy.  Normal pressure hydrocephalus would be highly unlikely.    Medical History (per EMR)  ? Hypertension  ? Denied history of head injury, seizure, or stroke    Health Behaviors   ? Sleep: ~7-8 hours of cumulative sleep nightly; denied sleep disturbance; denied snoring, gasping arousals, or parasomnic behaviors; energy is good most days though she has a longstanding history of taking a nap during the day; she noted feeling more tired today in the context of a cold.  ? Exercise: None  ? Pain: Occasional back pain managed with Tylenol PRN; denied pain at the time of this evaluation    Psychiatric History  ? Ms. Turner reported current mood is  okay . She reported feeling down and sad in the context of her  in  memory care. Her history is notable for depression and anxiety in the past but her daughter stated that neither depression nor anxiety symptoms are impacting her daily functioning.  ? She denied history of alteration of sensory perceptual processes or disordered thought.  ? Suicidality/ Self-harm: She denied any suicidal ideation, plan, or intent  ? Psychiatric treatment: Prescribed Lexapro for at least the past 5-6 years    Substance Use History  ? Alcohol: Reported drinking a couple of glasses of wine per day  ? Nicotine: None  ? Cannabis: None  ? Problematic Substance Use: Denied    Current Medications (per EMR)    albuterol (PROAIR HFA/PROVENTIL HFA/VENTOLIN HFA) 108 (90 Base) MCG/ACT inhaler     amLODIPine (NORVASC) 2.5 MG tablet     aspirin (ASA) 81 MG chewable tablet     escitalopram (LEXAPRO) 20 MG tablet     escitalopram (LEXAPRO) 20 MG tablet     lisinopril (ZESTRIL) 40 MG tablet     LORazepam (ATIVAN) 0.5 MG tablet     vitamin D3 (CHOLECALCIFEROL) 50 mcg (2000 units) tablet     zolpidem (AMBIEN) 5 MG tablet     Educational, & Occupational History  ? Childhood medical / behavioral / emotional / academic problems: Denied  ? Native Language: English  ? Education: Graduated high school  ? Occupation: ; retired following the birth of her first child    Psychosocial History  ? Born in [place], [place] and raised in [place]  ? Marital:  to spouse of 52 years  ? Children: 5 adult children; 8 grandchildren  ? [facility]  ? Psychosocial support: Strong support network of her children    RESULTS  See separate abstract for list of neuropsychological measures and test scores. Descriptive ranges are based on American Academy of Clinical Neuropsychology (2020) consensus guidelines, or test manuals where appropriate. All standardized scores are adjusted for age and additional adjustments for demographic factors such as education were performed where  applicable.    PRE-MORBID ABILITY: Premorbid abilities were estimated within the average range based on single word reading ability and demographic factors including sex, ethnicity, education, occupation, and geographic region.  GENERAL COGNITIVE STATUS: Orientation was within expectation for general personal information, time, place, and cultural information. Within the practical domain, performance was within expectation on a measure of conceptual understanding of issues pertaining to health and safety. Her score was consistent with individuals functioning at a high level of independence in the community.  ATTENTION/EXECUTIVE FUNCTIONS: Immediate auditory attention performance was average. Working memory performances were below average to low average. Verbal abstract reasoning performance was average. Performance on a test of set-shifting/cognitive flexibility was exceptionally low with 2 errors. Copy of a complex figure requiring planning and organization was within normal limits. Response inhibition performance was average when considering exceptionally low speeded word reading and color identification. Psychomotor processing speed performances were low average to average.  LANGUAGE: Confrontation naming performance was average. Letter-cue verbal fluency performance was below average. Semantic verbal fluency performance was average.   VISUOSPATIAL PROCESSING: Performance on a spatial perception task requiring judgement of angled lines was low average. Copy of increasingly intricate designs was low average. Copy of a complex figure was within normal limits.    LEARNING AND MEMORY: Initial encoding of a word list over multiple learning trials was low average. Delayed recall of the list was below average with 50% retention. Recognition of the word list was exceptionally low. Initial encoding of contextualized verbal information in the form of short stories was average and delayed retrieval was low average.  Recognition of story details was high average. Encoding of visual information was average and delayed recall was below average. Recognition among distractors was average.    PSYCHOLOGICAL AND BEHAVIORAL: She endorsed minimal symptoms of depression and anxiety on self-report questionnaires.    PERFORMANCE VALIDITY: Performance on neuropsychological tests is dependent upon a number of factors, including sufficient engagement and motivation, to reliably establish an examinee s level of cognitive functioning.  Based upon observations made throughout the evaluation, the patient did not appear to deliberately perform in a suboptimal manner and demonstrated good frustration tolerance on cognitively challenging tasks. Score on an imbedded index of performance validity was in the valid range. Overall, test results are believed to accurately represent the patient s current neurocognitive status.    BEHAVIORAL OBSERVATIONS  ? Presented on time and was accompanied by her daughter  ? Alert, attentive and focused while undergoing testing  ? Appearance: Well-groomed, casually dressed  ? Gait/Posture: Slow; ambulated with cane  ? Sensorimotor: No abnormalities noted  ? Behavior: Cooperative, pleasant, no behavioral abnormalities noted  ? Speech: Fluent, articulate, normal rate, prosody, and volume; no conversational word finding difficulty  ? Thought process: Logical, linear, and goal-directed  ? Thought content: Logical, appropriate  ? Affect: Broad, responsive, consistent with reported mood; good eye contact  ? Mood: Euthymic  ? Insight and Judgment: Intact  ? Approach to testing: Efficient, deliberate; good frustration on cognitively demanding tasks  ? Rapport: Easily established and maintained      Activities included in this evaluation: CPT Code #Units Time (min)   Psychiatric diagnostic interview [id number] --   Test evaluation services by professional; first hour. [id number]   Test evaluation services by professional,  additional hour (+) [id number] 2    Test administration and scoring by technician, first 30 mins [id number] [phone]   Test administration and scoring by technician, additional 30 mins (+) [id number]    Dx: G31.84        Again, thank you for allowing me to participate in the care of your patient.      Sincerely,    PATRICK PAZ, PhD

## 2023-11-20 NOTE — LETTER
2023      RE: Cami Turner  [address]  [facility], [place] [address]       NEUROPSYCHOLOGICAL EVALUATION  **CONFIDENTIAL**    **This is a medical document intended for communication with the referring provider. It is written in medical language and may contain abbreviations or verbiage that are unfamiliar. It may appear blunt or direct. This report and the results within are not intended to be interpreted in isolation without consultation with your medical provider. **      Name: Cami Turner Education: 12 years    (age): 1945 (78 years) MOODY:  2023   Referral: Gilson Casarez MD  Department of Neurology Handedness:  MRN (Epic): Right  [id number]     IDENTIFYING INFORMATION AND REASON FOR REFERRAL   Ms. Turner is an 78-year-old, right-handed, White female with a history including hypertension and depression. This evaluation was requested to provide a comprehensive assessment of her current neuropsychological status to inform treatment planning.     SUMMARY & IMPRESSION  See below for relevant background information and detailed test results. See separate abstract for supporting documentation including a list of neuropsychological measures and test scores.    Ms. Turner s current neurocognitive profile revealed impaired performance on tests of mental flexibility, letter-cue verbal fluency, and visual memory retrieval. Variability was noted across verbal memory tests with best performance within normal limits. Working memory scores were also variable. Performance was within expectation across other cognitive tests including immediate auditory attention, speeded processing, naming, semantic verbal fluency, visuospatial processing, abstract reasoning, cognitive inhibition, planning/organization, and knowledge of health and safety. Assessment of current psychological and emotional status revealed the absence of clinically significant depressive mood symptoms and anxiety.    Overall, Ms.  Yue exhibited impaired performance on select tests of memory retrieval and executive functioning. The etiology of her cognitive difficulties remains to be specified. Her medical history is relatively unremarkable for cerebrovascular risk factors; however, her neurocognitive profile is not strongly suggestive of an underlying neurodegenerative process, though this may best be evaluated by ongoing monitoring of her cognitive status over time. Low mood and stress may exacerbate any mild cognitive difficulties but would be unlikely to fully explain the extent of difficulties observed. While findings suggest Ms. Turner may experience subtle cognitive inefficiencies in her day-to-day life, she and her daughter reported limited functional difficulties at present. Taken together, observed test performance and reported levels of day-to-day functioning suggest a diagnosis of mild cognitive impairment (also known as mild neurocognitive disorder). Importantly, she has several cognitive strengths that will be beneficial to her in order to compensate for any cognitive weaknesses.    DIAGNOSTIC IMPRESSIONS (ICD-10)  ? Mild cognitive impairment (G31.84)    RECOMMENDATIONS  1. Results from the present evaluation revealed memory impairment and subtle executive dysfunction. It is recommended that trusted family provide periodic oversight and monitoring for complex activities of daily living to ensure her safety and wellbeing are maintained.    2. Ms. Turner is encouraged to live a healthy lifestyle that promotes brain health including getting appropriate exercise, as advised by her healthcare providers, and eating healthy.    3. It is recommended that Ms. Turner continue to maintain a socially active lifestyle. She is likely to benefit from continuing to engage in social activities that she enjoys as these activities may offer cognitive stimulation and provide emotional benefits that will maximize quality of life.   4. Ms. Turner  is encouraged to utilize the following behavioral strategies to maximize cognitive functioning in her daily life:   -Eliminate distraction. Eliminating environmental distracters can facilitate attention and memory performance. For example, turning off music or the television while having a conversation, so that all of your attention is focused on the task at hand.  -Engage in calming activities that increase focus on the present. Incorporating mindfulness techniques such as meditation, relaxation, yoga, and moderate cardiovascular exercise, into your daily routine will help keep you present-oriented and consequently improve attention and memory. Apps like Calm, and [employer]ee, have guided meditations and mindfulness tools (search  mindfulness meditation ).  -Pay mindful attention. You may find that you need to make a conscious effort to pay attention to conversations or when learning new information. When attention is not focused, it is difficult for the brain to learn new information. Thus, making a mindful effort to pay attention as you go through daily tasks will assist and facilitate memory recall later on.  -Use external aids to increase structure in daily life. Ongoing use of compensatory strategies such as notes, lists, and a centralized calendar are supported. Tools such as smart phones with alarms and reminders are helpful in bringing structure to daily activities.   -Compartmentalize problem solving.  Executive function difficulties may interfere with your ability to make decisions and reason through complex and/or abstract situations in daily life. Break large overwhelming decisions into small manageable problems, and do not go on to the next step before accomplishing the previous one. Use written outlines to track your progress.   -Allow for time. Take the time needed to learn and recall information. Some people tend to worry if they can't immediately recall something. Instead, you should take time to  express a thought, or complete a task rather than be critical or harsh on yourself.  5. The current evaluation will serve as an objective cognitive baseline against which results of future evaluations may be compared.  Ms. Turner may be referred for a follow-up neuropsychological evaluation in 12 months to objectively assess neurocognitive change.     Thank you for the opportunity to participate in Ms. Yue ureña care.  These findings and recommendations were reviewed with the patient and her daughter immediately following this evaluation on 11/20/2023 and all their questions were answered. If you have any questions regarding this evaluation, please do not hesitate to contact me.       Venecia Dutta, Ph.D.,   Clinical Neuropsychologist    RELEVANT BACKGROUND INFORMATION AND SUPPORTING DOCUMENTATION  Gathered from a clinical interview with the patient and her daughter, and reviews of electronic medical record.     History of Presenting Problem(s)  Ms. Turner presented with a history of hypertension and depression. She reported noting occasional word-finding difficulties and trouble recalling names for the past 2-3 months, but she denied other cognitive complaints. Her daughter noted memory changes in the past year or longer characterized by repeating stories, forgetting conversations, and occasional word finding difficulties. She gave an example of recently forgetting what she ordered at a restaurant when the food arrived. She also noted needing more support with making decisions and planning, but she denied other cognitive complaints. Her daughter also noted that since Ms. 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 Ms. Turner's son has been overseeing her finances for the last 1-2 years as she was  missing bills. Per EMR she almost lost her car and life insurance; however, her daughter noted that she continues to manage the day-to-day finances and double checks her statements, though most things are on autopay. Ms. Turner independently manages her medications with the use of a pill box and denied ever forgetting to take her pills. She noted more trouble with multitasking when preparing a complex meal but denied difficulties with simple meal preparation. She only drives close by her home and during the day. Her daughter denied any concerns about her managing her medications, meal preparations, driving, and her basic self-care. She continues to receive caregiver assistance 1 day per week, which was initially put in place for her  s care, but this caregiver assists around the home and helps drive her to appointments when needed. Physically, she fell in October 2022 as a result of leg weakness, but they denied any falls since that time. Her daughter noted slowed gait and she has been using a cane for ambulation in the past 6 months. They denied other physical complaints or sensorimotor disturbances. Emotionally, she stated her mood is  okay , though she noted feeling down and sad in the context of her  in memory care. Her history is notable for depression, and she has been on Lexapro for at least 5-6 years. Her daughter also noted a history of anxiety in the past but stated that her anxiety is well controlled now and neither depression nor anxiety symptoms are impacting her daily functioning. They denied any behavioral or personality changes or concerns.    Neurodiagnostic Findings   ? Brief cognitive screening measure administered during neurology visit with Dr. Casarez (6/7/2023): MoCA= 20/30  ? Brain MRI w/wo contrast (10/13/2022) IMPRESSION: 1.  No acute intracranial process identified. 2.  Unchanged ventriculomegaly disproportionate to the degree of sulcal prominence and other findings that  raise concern for the possibility of chronic communicating/normal pressure hydrocephalus. Recommend clinical correlation. The degree of ventriculomegaly is unchanged from the prior CT. 3.  Brain parenchymal volume loss and presumed chronic small vessel ischemic changes, as described. 4.  Patchy nonspecific T2 hyperintense marrow signal and enhancement involving the right mandibular condyle in the region of the right temporomandibular joint. This may be reactive to degenerative arthropathy. Recommend clinical correlation.   ? Head CT w/o contrast (6/5/2021) IMPRESSION:   1.  No discrete mass lesion, hemorrhage or focal area suggestive of acute infarct. 2.  Mild diffuse small vessel ischemic disease. 3.  Ventriculomegaly suggestive of normal pressure hydrocephalus. Recommend clinical correlation.  ? Per EMR neurology note dated 6/7/2023: We reviewed her brain MRI scans, which demonstrated mild ventriculomegaly that in my opinion would correlate with the degree of atrophy.  Normal pressure hydrocephalus would be highly unlikely.    Medical History (per EMR)  ? Hypertension  ? Denied history of head injury, seizure, or stroke    Health Behaviors   ? Sleep: ~7-8 hours of cumulative sleep nightly; denied sleep disturbance; denied snoring, gasping arousals, or parasomnic behaviors; energy is good most days though she has a longstanding history of taking a nap during the day; she noted feeling more tired today in the context of a cold.  ? Exercise: None  ? Pain: Occasional back pain managed with Tylenol PRN; denied pain at the time of this evaluation    Psychiatric History  ? Ms. Turner reported current mood is  okay . She reported feeling down and sad in the context of her  in memory care. Her history is notable for depression and anxiety in the past but her daughter stated that neither depression nor anxiety symptoms are impacting her daily functioning.  ? She denied history of alteration of sensory perceptual  processes or disordered thought.  ? Suicidality/ Self-harm: She denied any suicidal ideation, plan, or intent  ? Psychiatric treatment: Prescribed Lexapro for at least the past 5-6 years    Substance Use History  ? Alcohol: Reported drinking a couple of glasses of wine per day  ? Nicotine: None  ? Cannabis: None  ? Problematic Substance Use: Denied    Current Medications (per EMR)    albuterol (PROAIR HFA/PROVENTIL HFA/VENTOLIN HFA) 108 (90 Base) MCG/ACT inhaler     amLODIPine (NORVASC) 2.5 MG tablet     aspirin (ASA) 81 MG chewable tablet     escitalopram (LEXAPRO) 20 MG tablet     escitalopram (LEXAPRO) 20 MG tablet     lisinopril (ZESTRIL) 40 MG tablet     LORazepam (ATIVAN) 0.5 MG tablet     vitamin D3 (CHOLECALCIFEROL) 50 mcg (2000 units) tablet     zolpidem (AMBIEN) 5 MG tablet     Educational, & Occupational History  ? Childhood medical / behavioral / emotional / academic problems: Denied  ? Native Language: English  ? Education: Graduated high school  ? Occupation: ; retired following the birth of her first child    Psychosocial History  ? Born in [place], [place] and raised in [place]  ? Marital:  to spouse of 52 years  ? Children: 5 adult children; 8 grandchildren  ? [place]  ? Psychosocial support: Strong support network of her children    RESULTS  See separate abstract for list of neuropsychological measures and test scores. Descriptive ranges are based on American Academy of Clinical Neuropsychology (2020) consensus guidelines, or test manuals where appropriate. All standardized scores are adjusted for age and additional adjustments for demographic factors such as education were performed where applicable.    PRE-MORBID ABILITY: Premorbid abilities were estimated within the average range based on single word reading ability and demographic factors including sex, ethnicity, education, occupation, and geographic region.  GENERAL COGNITIVE STATUS:  Orientation was within expectation for general personal information, time, place, and cultural information. Within the practical domain, performance was within expectation on a measure of conceptual understanding of issues pertaining to health and safety. Her score was consistent with individuals functioning at a high level of independence in the community.  ATTENTION/EXECUTIVE FUNCTIONS: Immediate auditory attention performance was average. Working memory performances were below average to low average. Verbal abstract reasoning performance was average. Performance on a test of set-shifting/cognitive flexibility was exceptionally low with 2 errors. Copy of a complex figure requiring planning and organization was within normal limits. Response inhibition performance was average when considering exceptionally low speeded word reading and color identification. Psychomotor processing speed performances were low average to average.  LANGUAGE: Confrontation naming performance was average. Letter-cue verbal fluency performance was below average. Semantic verbal fluency performance was average.   VISUOSPATIAL PROCESSING: Performance on a spatial perception task requiring judgement of angled lines was low average. Copy of increasingly intricate designs was low average. Copy of a complex figure was within normal limits.    LEARNING AND MEMORY: Initial encoding of a word list over multiple learning trials was low average. Delayed recall of the list was below average with 50% retention. Recognition of the word list was exceptionally low. Initial encoding of contextualized verbal information in the form of short stories was average and delayed retrieval was low average. Recognition of story details was high average. Encoding of visual information was average and delayed recall was below average. Recognition among distractors was average.    PSYCHOLOGICAL AND BEHAVIORAL: She endorsed minimal symptoms of depression and anxiety on  self-report questionnaires.    PERFORMANCE VALIDITY: Performance on neuropsychological tests is dependent upon a number of factors, including sufficient engagement and motivation, to reliably establish an examinee s level of cognitive functioning.  Based upon observations made throughout the evaluation, the patient did not appear to deliberately perform in a suboptimal manner and demonstrated good frustration tolerance on cognitively challenging tasks. Score on an imbedded index of performance validity was in the valid range. Overall, test results are believed to accurately represent the patient s current neurocognitive status.    BEHAVIORAL OBSERVATIONS  ? Presented on time and was accompanied by her daughter  ? Alert, attentive and focused while undergoing testing  ? Appearance: Well-groomed, casually dressed  ? Gait/Posture: Slow; ambulated with cane  ? Sensorimotor: No abnormalities noted  ? Behavior: Cooperative, pleasant, no behavioral abnormalities noted  ? Speech: Fluent, articulate, normal rate, prosody, and volume; no conversational word finding difficulty  ? Thought process: Logical, linear, and goal-directed  ? Thought content: Logical, appropriate  ? Affect: Broad, responsive, consistent with reported mood; good eye contact  ? Mood: Euthymic  ? Insight and Judgment: Intact  ? Approach to testing: Efficient, deliberate; good frustration on cognitively demanding tasks  ? Rapport: Easily established and maintained      Activities included in this evaluation: CPT Code #Units Time (min)   Psychiatric diagnostic interview [id number] --   Test evaluation services by professional; first hour. [id number] [phone]   Test evaluation services by professional, additional hour (+) [id number] 2    Test administration and scoring by technician, first 30 mins [phone]   Test administration and scoring by technician, additional 30 mins (+) [id number]    Dx: G31.84        PATRICK PAZ, PhD

## 2023-11-20 NOTE — NURSING NOTE
The patient was seen for neuropsychological evaluation at the request of Dr. Gilson Casarez, for the purposes of diagnostic clarification and treatment planning. 145 minutes of test administration and scoring were provided by this writer, Chris Hartman. Please see Dr. Venecia Dutta's report for a full interpretation of the findings.

## 2023-11-21 NOTE — PROGRESS NOTES
Provider: CE      Tech: RENA      Patient Name: Cami Turner      : [birth year]      MRN: [id number]      MOODY: 23      Age: 78      Education: 12      Ethnicity: C      Handedness: Right      Station: OP             NEUROPSYCHOLOGICAL TESTS              PREMORBID ABILITY RAW SCORE STANDARDIZED SCORE* DESCRIPTIVE RANGE**   WAIS-IV ACS Test of Premorbid Functioning (TOPF) 30 SS= 94 Average     Estimated FSIQ = 98 Average          ATTENTION AND EXECUTIVE FUNCTIONS RAW SCORE STANDARDIZED SCORE* DESCRIPTIVE RANGE**   Wechsler Adult Intelligence Scale - 4th Edition (WAIS-IV)      Digit Span Forward 8 ss= 8 Average   Digit Span Backward 4 ss= 6 Low Average   Digit Span Sequencing 2 ss= 5 Below Average   Digit Span Total 14 ss= 5 Below Average   Coding 45 ss= 11 Average   Similarities 20 ss= 9 Average   Trail Making Test (Time/errors)        Part A 73/0 Amber= 7 Low Average   Part B 277/2 Amber= 2 Exceptionally Low   Stroop       Word e 64 TS= 28 Exceptionally Low   Color e 40 TS= 25 Exceptionally Low   Color/Word e 18 TS= 38 Low Average   Interference e -6 TS= 44 Average   Independent Living Scales (ILS)       Health and Safety 36 TS= 53 High          LANGUAGE RAW SCORE STANDARDIZED SCORE* DESCRIPTIVE RANGE**   [place] Naming Test 52 Amber= 10 Average   Letter-Cue Verbal Fluency 15-7-4 (4) Amber= 4 Below Average   Semantic Fluency 14-16-10 (40) MAS= 11 Average          VISUOSPATIAL PROCESSING RAW SCORE STANDARDIZED SCORE* DESCRIPTIVE RANGE**   Soham Complex Figure Test (RCFT) Copy 31 %ile= >16 WNL   Repeatable Battery for the Assessment of Neuropsychological Status (RBANS; Form A)    Line Orientation 13 %ile= 10-16 Low Average   WMS-IV Visual Reproduction Copy 39 %= 17-25 Low Average          LEARNING & MEMORY RAW SCORE STANDARDIZED SCORE* DESCRIPTIVE RANGE**   Wechsler Memory Scale-4th Edition (WMS-IV)       Logical Memory I 22 ss= 8 Average   Logical Memory II 5 ss= 6 Low Average   Logical Memory Recognition 20 %= >75 High  Average   Visual Reproduction I 20 ss= 8 Average   Visual Reproduction II 2 ss= 5 Below Average   Visual Reproduction Recognition 3 %= 26-50 Average   Combs Verbal Learning Test - Revised (HVLT-R; Form 1)      Total Trials 1-3 4-6-6 (16) TS= 37 Low Average   Delayed Recall 3 TS= 31 Below Average   Retention (%) 50% TS= 32 Below Average   Recognition Hits 8 --     Recognition False Alarms 2 --     Recognition Discriminability 6 TS= 25 Exceptionally Low          PSYCHOLOGICAL & BEHAVIORAL SYMPTOMS RAW SCORE STANDARDIZED SCORE* DESCRIPTIVE RANGE**   Geriatric Depression Scale (GDS) 2 --  Minimal   Geriatric Anxiey Scale (GAS)       Somatic 4 --  Minimal   Cognitive 2 --  Minimal   Affective 0 --  Minimal   Total 6 --  Minimal          PERFORMANCE VALIDITY RAW SCORE   DESCRIPTIVE RANGE**   RDS 7   Valid Range          * All standardized scores are adjusted for age. Superscripts denote additional adjustment for (s)ex, (r)ace/ethnicity, (l)anguage, and/or (e)ducation.   ** Descriptive ranges are based on American Academy of Clinical Neuropsychology (2020) consensus guidelines, or test manuals where appropriate.    WNL = within normal limits. DC = discontinued due to patient s inability to complete the test.      Standardized scores: TS = T-score; mean = 50, standard deviation =10; z = z-score; mean = 0, standard deviation = 1; ss = scaled score; mean = 10, standard deviation = 3; MAS = [place] Older Adult Normative Study age adjusted scaled score; mean = 10, standard deviation = 3; SS = standard score; mean = 100, standard deviation = 15.

## 2023-11-21 NOTE — PROGRESS NOTES
NEUROPSYCHOLOGICAL EVALUATION  **CONFIDENTIAL**    **This is a medical document intended for communication with the referring provider. It is written in medical language and may contain abbreviations or verbiage that are unfamiliar. It may appear blunt or direct. This report and the results within are not intended to be interpreted in isolation without consultation with your medical provider. **      Name: Cami Turner Education: 12 years    (age): 1945 (78 years) MOODY:  2023   Referral: Gilson Casarez MD  Department of Neurology Handedness:  MRN (Epic): Right  [id number]     IDENTIFYING INFORMATION AND REASON FOR REFERRAL   Ms. Turner is an 78-year-old, right-handed, White female with a history including hypertension and depression. This evaluation was requested to provide a comprehensive assessment of her current neuropsychological status to inform treatment planning.     SUMMARY & IMPRESSION  See below for relevant background information and detailed test results. See separate abstract for supporting documentation including a list of neuropsychological measures and test scores.    Ms. Turner s current neurocognitive profile revealed impaired performance on tests of mental flexibility, letter-cue verbal fluency, and visual memory retrieval. Variability was noted across verbal memory tests with best performance within normal limits. Working memory scores were also variable. Performance was within expectation across other cognitive tests including immediate auditory attention, speeded processing, naming, semantic verbal fluency, visuospatial processing, abstract reasoning, cognitive inhibition, planning/organization, and knowledge of health and safety. Assessment of current psychological and emotional status revealed the absence of clinically significant depressive mood symptoms and anxiety.    Overall, Ms. Turner exhibited impaired performance on select tests of memory retrieval and executive  functioning. The etiology of her cognitive difficulties remains to be specified. Her medical history is relatively unremarkable for cerebrovascular risk factors; however, her neurocognitive profile is not strongly suggestive of an underlying neurodegenerative process, though this may best be evaluated by ongoing monitoring of her cognitive status over time. Low mood and stress may exacerbate any mild cognitive difficulties but would be unlikely to fully explain the extent of difficulties observed. While findings suggest Ms. Turner may experience subtle cognitive inefficiencies in her day-to-day life, she and her daughter reported limited functional difficulties at present. Taken together, observed test performance and reported levels of day-to-day functioning suggest a diagnosis of mild cognitive impairment (also known as mild neurocognitive disorder). Importantly, she has several cognitive strengths that will be beneficial to her in order to compensate for any cognitive weaknesses.    DIAGNOSTIC IMPRESSIONS (ICD-10)  ? Mild cognitive impairment (G31.84)    RECOMMENDATIONS  1. Results from the present evaluation revealed memory impairment and subtle executive dysfunction. It is recommended that trusted family provide periodic oversight and monitoring for complex activities of daily living to ensure her safety and wellbeing are maintained.    2. Ms. Turner is encouraged to live a healthy lifestyle that promotes brain health including getting appropriate exercise, as advised by her healthcare providers, and eating healthy.    3. It is recommended that Ms. Turner continue to maintain a socially active lifestyle. She is likely to benefit from continuing to engage in social activities that she enjoys as these activities may offer cognitive stimulation and provide emotional benefits that will maximize quality of life.   4. Ms. Turner is encouraged to utilize the following behavioral strategies to maximize cognitive  functioning in her daily life:   -Eliminate distraction. Eliminating environmental distracters can facilitate attention and memory performance. For example, turning off music or the television while having a conversation, so that all of your attention is focused on the task at hand.  -Engage in calming activities that increase focus on the present. Incorporating mindfulness techniques such as meditation, relaxation, yoga, and moderate cardiovascular exercise, into your daily routine will help keep you present-oriented and consequently improve attention and memory. Apps like Calm, and Loaded Pocket, have guided meditations and mindfulness tools (search  mindfulness meditation ).  -Pay mindful attention. You may find that you need to make a conscious effort to pay attention to conversations or when learning new information. When attention is not focused, it is difficult for the brain to learn new information. Thus, making a mindful effort to pay attention as you go through daily tasks will assist and facilitate memory recall later on.  -Use external aids to increase structure in daily life. Ongoing use of compensatory strategies such as notes, lists, and a centralized calendar are supported. Tools such as smart phones with alarms and reminders are helpful in bringing structure to daily activities.   -Compartmentalize problem solving.  Executive function difficulties may interfere with your ability to make decisions and reason through complex and/or abstract situations in daily life. Break large overwhelming decisions into small manageable problems, and do not go on to the next step before accomplishing the previous one. Use written outlines to track your progress.   -Allow for time. Take the time needed to learn and recall information. Some people tend to worry if they can't immediately recall something. Instead, you should take time to express a thought, or complete a task rather than be critical or harsh on  yourself.  5. The current evaluation will serve as an objective cognitive baseline against which results of future evaluations may be compared.  Ms. Turner may be referred for a follow-up neuropsychological evaluation in 12 months to objectively assess neurocognitive change.     Thank you for the opportunity to participate in Ms. Turner s care.  These findings and recommendations were reviewed with the patient and her daughter immediately following this evaluation on 11/20/2023 and all their questions were answered. If you have any questions regarding this evaluation, please do not hesitate to contact me.       Venecia Dutta, Ph.D.,   Clinical Neuropsychologist    RELEVANT BACKGROUND INFORMATION AND SUPPORTING DOCUMENTATION  Gathered from a clinical interview with the patient and her daughter, and reviews of electronic medical record.     History of Presenting Problem(s)  Ms. Turner presented with a history of hypertension and depression. She reported noting occasional word-finding difficulties and trouble recalling names for the past 2-3 months, but she denied other cognitive complaints. Her daughter noted memory changes in the past year or longer characterized by repeating stories, forgetting conversations, and occasional word finding difficulties. She gave an example of recently forgetting what she ordered at a restaurant when the food arrived. She also noted needing more support with making decisions and planning, but she denied other cognitive complaints. Her daughter also noted that since Ms. 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 Ms. Turner's son has been overseeing her finances for the last 1-2 years as she was missing bills. Per EMR she almost lost her car and life insurance;  however, her daughter noted that she continues to manage the day-to-day finances and double checks her statements, though most things are on autopay. Ms. Turner independently manages her medications with the use of a pill box and denied ever forgetting to take her pills. She noted more trouble with multitasking when preparing a complex meal but denied difficulties with simple meal preparation. She only drives close by her home and during the day. Her daughter denied any concerns about her managing her medications, meal preparations, driving, and her basic self-care. She continues to receive caregiver assistance 1 day per week, which was initially put in place for her  s care, but this caregiver assists around the home and helps drive her to appointments when needed. Physically, she fell in October 2022 as a result of leg weakness, but they denied any falls since that time. Her daughter noted slowed gait and she has been using a cane for ambulation in the past 6 months. They denied other physical complaints or sensorimotor disturbances. Emotionally, she stated her mood is  okay , though she noted feeling down and sad in the context of her  in memory care. Her history is notable for depression, and she has been on Lexapro for at least 5-6 years. Her daughter also noted a history of anxiety in the past but stated that her anxiety is well controlled now and neither depression nor anxiety symptoms are impacting her daily functioning. They denied any behavioral or personality changes or concerns.    Neurodiagnostic Findings   ? Brief cognitive screening measure administered during neurology visit with Dr. Casarez (6/7/2023): MoCA= 20/30  ? Brain MRI w/wo contrast (10/13/2022) IMPRESSION: 1.  No acute intracranial process identified. 2.  Unchanged ventriculomegaly disproportionate to the degree of sulcal prominence and other findings that raise concern for the possibility of chronic communicating/normal  pressure hydrocephalus. Recommend clinical correlation. The degree of ventriculomegaly is unchanged from the prior CT. 3.  Brain parenchymal volume loss and presumed chronic small vessel ischemic changes, as described. 4.  Patchy nonspecific T2 hyperintense marrow signal and enhancement involving the right mandibular condyle in the region of the right temporomandibular joint. This may be reactive to degenerative arthropathy. Recommend clinical correlation.   ? Head CT w/o contrast (6/5/2021) IMPRESSION:   1.  No discrete mass lesion, hemorrhage or focal area suggestive of acute infarct. 2.  Mild diffuse small vessel ischemic disease. 3.  Ventriculomegaly suggestive of normal pressure hydrocephalus. Recommend clinical correlation.  ? Per EMR neurology note dated 6/7/2023: We reviewed her brain MRI scans, which demonstrated mild ventriculomegaly that in my opinion would correlate with the degree of atrophy.  Normal pressure hydrocephalus would be highly unlikely.    Medical History (per EMR)  ? Hypertension  ? Denied history of head injury, seizure, or stroke    Health Behaviors   ? Sleep: ~7-8 hours of cumulative sleep nightly; denied sleep disturbance; denied snoring, gasping arousals, or parasomnic behaviors; energy is good most days though she has a longstanding history of taking a nap during the day; she noted feeling more tired today in the context of a cold.  ? Exercise: None  ? Pain: Occasional back pain managed with Tylenol PRN; denied pain at the time of this evaluation    Psychiatric History  ? Ms. Turner reported current mood is  okay . She reported feeling down and sad in the context of her  in memory care. Her history is notable for depression and anxiety in the past but her daughter stated that neither depression nor anxiety symptoms are impacting her daily functioning.  ? She denied history of alteration of sensory perceptual processes or disordered thought.  ? Suicidality/ Self-harm: She denied  any suicidal ideation, plan, or intent  ? Psychiatric treatment: Prescribed Lexapro for at least the past 5-6 years    Substance Use History  ? Alcohol: Reported drinking a couple of glasses of wine per day  ? Nicotine: None  ? Cannabis: None  ? Problematic Substance Use: Denied    Current Medications (per EMR)    albuterol (PROAIR HFA/PROVENTIL HFA/VENTOLIN HFA) 108 (90 Base) MCG/ACT inhaler     amLODIPine (NORVASC) 2.5 MG tablet     aspirin (ASA) 81 MG chewable tablet     escitalopram (LEXAPRO) 20 MG tablet     escitalopram (LEXAPRO) 20 MG tablet     lisinopril (ZESTRIL) 40 MG tablet     LORazepam (ATIVAN) 0.5 MG tablet     vitamin D3 (CHOLECALCIFEROL) 50 mcg (2000 units) tablet     zolpidem (AMBIEN) 5 MG tablet     Educational, & Occupational History  ? Childhood medical / behavioral / emotional / academic problems: Denied  ? Native Language: English  ? Education: Graduated high school  ? Occupation: ; retired following the birth of her first child    Psychosocial History  ? Born in [place], [place] and raised in [place], [place]  ? Marital:  to spouse of 52 years  ? Children: 5 adult children; 8 grandchildren  ? [place]  ? Psychosocial support: Strong support network of her children    RESULTS  See separate abstract for list of neuropsychological measures and test scores. Descriptive ranges are based on American Academy of Clinical Neuropsychology (2020) consensus guidelines, or test manuals where appropriate. All standardized scores are adjusted for age and additional adjustments for demographic factors such as education were performed where applicable.    PRE-MORBID ABILITY: Premorbid abilities were estimated within the average range based on single word reading ability and demographic factors including sex, ethnicity, education, occupation, and geographic region.  GENERAL COGNITIVE STATUS: Orientation was within expectation for general personal information, time,  place, and cultural information. Within the practical domain, performance was within expectation on a measure of conceptual understanding of issues pertaining to health and safety. Her score was consistent with individuals functioning at a high level of independence in the community.  ATTENTION/EXECUTIVE FUNCTIONS: Immediate auditory attention performance was average. Working memory performances were below average to low average. Verbal abstract reasoning performance was average. Performance on a test of set-shifting/cognitive flexibility was exceptionally low with 2 errors. Copy of a complex figure requiring planning and organization was within normal limits. Response inhibition performance was average when considering exceptionally low speeded word reading and color identification. Psychomotor processing speed performances were low average to average.  LANGUAGE: Confrontation naming performance was average. Letter-cue verbal fluency performance was below average. Semantic verbal fluency performance was average.   VISUOSPATIAL PROCESSING: Performance on a spatial perception task requiring judgement of angled lines was low average. Copy of increasingly intricate designs was low average. Copy of a complex figure was within normal limits.    LEARNING AND MEMORY: Initial encoding of a word list over multiple learning trials was low average. Delayed recall of the list was below average with 50% retention. Recognition of the word list was exceptionally low. Initial encoding of contextualized verbal information in the form of short stories was average and delayed retrieval was low average. Recognition of story details was high average. Encoding of visual information was average and delayed recall was below average. Recognition among distractors was average.    PSYCHOLOGICAL AND BEHAVIORAL: She endorsed minimal symptoms of depression and anxiety on self-report questionnaires.    PERFORMANCE VALIDITY: Performance on  neuropsychological tests is dependent upon a number of factors, including sufficient engagement and motivation, to reliably establish an examinee s level of cognitive functioning.  Based upon observations made throughout the evaluation, the patient did not appear to deliberately perform in a suboptimal manner and demonstrated good frustration tolerance on cognitively challenging tasks. Score on an imbedded index of performance validity was in the valid range. Overall, test results are believed to accurately represent the patient s current neurocognitive status.    BEHAVIORAL OBSERVATIONS  ? Presented on time and was accompanied by her daughter  ? Alert, attentive and focused while undergoing testing  ? Appearance: Well-groomed, casually dressed  ? Gait/Posture: Slow; ambulated with cane  ? Sensorimotor: No abnormalities noted  ? Behavior: Cooperative, pleasant, no behavioral abnormalities noted  ? Speech: Fluent, articulate, normal rate, prosody, and volume; no conversational word finding difficulty  ? Thought process: Logical, linear, and goal-directed  ? Thought content: Logical, appropriate  ? Affect: Broad, responsive, consistent with reported mood; good eye contact  ? Mood: Euthymic  ? Insight and Judgment: Intact  ? Approach to testing: Efficient, deliberate; good frustration on cognitively demanding tasks  ? Rapport: Easily established and maintained      Activities included in this evaluation: CPT Code #Units Time (min)   Psychiatric diagnostic interview 70479 1 --   Test evaluation services by professional; first hour. [id number] 1 155   Test evaluation services by professional, additional hour (+) 21772 2    Test administration and scoring by technician, first 30 mins [id number] 145   Test administration and scoring by technician, additional 30 mins (+) [id number] 4    Dx: G31.84

## 2023-11-24 ENCOUNTER — OFFICE VISIT (OUTPATIENT)
Dept: FAMILY MEDICINE | Facility: CLINIC | Age: 78
End: 2023-11-24
Payer: MEDICARE

## 2023-11-24 ENCOUNTER — OFFICE VISIT (OUTPATIENT)
Dept: PEDIATRICS | Facility: CLINIC | Age: 78
End: 2023-11-24
Payer: MEDICARE

## 2023-11-24 ENCOUNTER — HOSPITAL ENCOUNTER (OUTPATIENT)
Dept: CT IMAGING | Facility: CLINIC | Age: 78
Discharge: HOME | End: 2023-11-24
Attending: PREVENTIVE MEDICINE | Admitting: PREVENTIVE MEDICINE
Payer: MEDICARE

## 2023-11-24 ENCOUNTER — HOSPITAL ENCOUNTER (OUTPATIENT)
Facility: CLINIC | Age: 78
Setting detail: OBSERVATION
Discharge: HOME | End: 2023-11-26
Attending: STUDENT IN AN ORGANIZED HEALTH CARE EDUCATION/TRAINING PROGRAM | Admitting: INTERNAL MEDICINE
Payer: MEDICARE

## 2023-11-24 ENCOUNTER — ANCILLARY PROCEDURE (OUTPATIENT)
Dept: GENERAL RADIOLOGY | Facility: CLINIC | Age: 78
End: 2023-11-24
Attending: NURSE PRACTITIONER
Payer: MEDICARE

## 2023-11-24 ENCOUNTER — NURSE TRIAGE (OUTPATIENT)
Dept: FAMILY MEDICINE | Facility: CLINIC | Age: 78
End: 2023-11-24

## 2023-11-24 VITALS
HEIGHT: 61 IN | WEIGHT: 181.4 LBS | OXYGEN SATURATION: 94 % | TEMPERATURE: 98.3 F | BODY MASS INDEX: 34.25 KG/M2 | DIASTOLIC BLOOD PRESSURE: 67 MMHG | SYSTOLIC BLOOD PRESSURE: 122 MMHG | HEART RATE: 116 BPM | RESPIRATION RATE: 18 BRPM

## 2023-11-24 VITALS
HEIGHT: 61 IN | BODY MASS INDEX: 34.17 KG/M2 | RESPIRATION RATE: 20 BRPM | HEART RATE: 86 BPM | WEIGHT: 181 LBS | SYSTOLIC BLOOD PRESSURE: 136 MMHG | DIASTOLIC BLOOD PRESSURE: 84 MMHG | OXYGEN SATURATION: 93 % | TEMPERATURE: 98.2 F

## 2023-11-24 DIAGNOSIS — R06.02 SOB (SHORTNESS OF BREATH): ICD-10-CM

## 2023-11-24 DIAGNOSIS — R79.89 ELEVATED TROPONIN: ICD-10-CM

## 2023-11-24 DIAGNOSIS — R91.1 PULMONARY NODULE 1 CM OR GREATER IN DIAMETER: ICD-10-CM

## 2023-11-24 DIAGNOSIS — R91.8 LUNG FIELD ABNORMAL FINDING ON EXAMINATION: ICD-10-CM

## 2023-11-24 DIAGNOSIS — J45.998 SEASONAL ASTHMA: ICD-10-CM

## 2023-11-24 DIAGNOSIS — R05.1 ACUTE COUGH: ICD-10-CM

## 2023-11-24 DIAGNOSIS — R91.1 PULMONARY NODULE: ICD-10-CM

## 2023-11-24 DIAGNOSIS — R06.02 SHORTNESS OF BREATH: Primary | ICD-10-CM

## 2023-11-24 DIAGNOSIS — J18.9 PNEUMONIA OF BOTH LUNGS DUE TO INFECTIOUS ORGANISM, UNSPECIFIED PART OF LUNG: ICD-10-CM

## 2023-11-24 DIAGNOSIS — J18.9 MULTIFOCAL PNEUMONIA: ICD-10-CM

## 2023-11-24 DIAGNOSIS — R06.02 SHORTNESS OF BREATH: ICD-10-CM

## 2023-11-24 DIAGNOSIS — J45.21 MILD INTERMITTENT ASTHMA WITH EXACERBATION: Primary | ICD-10-CM

## 2023-11-24 DIAGNOSIS — R06.02 SOB (SHORTNESS OF BREATH): Primary | ICD-10-CM

## 2023-11-24 DIAGNOSIS — R91.8 ABNORMALITY OF LUNG ON CHEST X-RAY: ICD-10-CM

## 2023-11-24 LAB
ALBUMIN SERPL BCG-MCNC: 4 G/DL (ref 3.5–5.2)
ALP SERPL-CCNC: 83 U/L (ref 40–150)
ALT SERPL W P-5'-P-CCNC: 15 U/L (ref 0–50)
ANION GAP SERPL CALCULATED.3IONS-SCNC: 16 MMOL/L (ref 7–15)
APTT PPP: 33 SECONDS (ref 22–38)
AST SERPL W P-5'-P-CCNC: 23 U/L (ref 0–45)
BASOPHILS # BLD AUTO: 0 10E3/UL (ref 0–0.2)
BASOPHILS NFR BLD AUTO: 0 %
BILIRUB SERPL-MCNC: 0.3 MG/DL
BUN SERPL-MCNC: 19.9 MG/DL (ref 8–23)
CALCIUM SERPL-MCNC: 9.1 MG/DL (ref 8.8–10.2)
CHLORIDE SERPL-SCNC: 97 MMOL/L (ref 98–107)
CREAT SERPL-MCNC: 0.89 MG/DL (ref 0.51–0.95)
D DIMER PPP FEU-MCNC: 1.5 UG/ML FEU (ref 0–0.5)
DEPRECATED HCO3 PLAS-SCNC: 21 MMOL/L (ref 22–29)
EGFRCR SERPLBLD CKD-EPI 2021: 65 ML/MIN/1.73M2
EOSINOPHIL # BLD AUTO: 0.1 10E3/UL (ref 0–0.7)
EOSINOPHIL NFR BLD AUTO: 1 %
ERYTHROCYTE [DISTWIDTH] IN BLOOD BY AUTOMATED COUNT: 13.2 % (ref 10–15)
FLUAV AG SPEC QL IA: NEGATIVE
FLUBV AG SPEC QL IA: NEGATIVE
GLUCOSE SERPL-MCNC: 95 MG/DL (ref 70–99)
HCT VFR BLD AUTO: 38.3 % (ref 35–47)
HGB BLD-MCNC: 12.7 G/DL (ref 11.7–15.7)
IMM GRANULOCYTES # BLD: 0 10E3/UL
IMM GRANULOCYTES NFR BLD: 0 %
INR PPP: 1.1 (ref 0.85–1.15)
LYMPHOCYTES # BLD AUTO: 1.8 10E3/UL (ref 0.8–5.3)
LYMPHOCYTES NFR BLD AUTO: 16 %
MCH RBC QN AUTO: 31.1 PG (ref 26.5–33)
MCHC RBC AUTO-ENTMCNC: 33.2 G/DL (ref 31.5–36.5)
MCV RBC AUTO: 94 FL (ref 78–100)
MONOCYTES # BLD AUTO: 0.7 10E3/UL (ref 0–1.3)
MONOCYTES NFR BLD AUTO: 7 %
NEUTROPHILS # BLD AUTO: 8.5 10E3/UL (ref 1.6–8.3)
NEUTROPHILS NFR BLD AUTO: 76 %
NRBC # BLD AUTO: 0 10E3/UL
NRBC BLD AUTO-RTO: 0 /100
NT-PROBNP SERPL-MCNC: 429 PG/ML (ref 0–1800)
PLATELET # BLD AUTO: 282 10E3/UL (ref 150–450)
POTASSIUM SERPL-SCNC: 4 MMOL/L (ref 3.4–5.3)
PROCALCITONIN SERPL IA-MCNC: 0.3 NG/ML
PROT SERPL-MCNC: 7.5 G/DL (ref 6.4–8.3)
RADIOLOGIST FLAGS: NORMAL
RBC # BLD AUTO: 4.09 10E6/UL (ref 3.8–5.2)
SARS-COV-2 RNA RESP QL NAA+PROBE: NEGATIVE
SODIUM SERPL-SCNC: 134 MMOL/L (ref 135–145)
TROPONIN T SERPL HS-MCNC: 18 NG/L
TROPONIN T SERPL HS-MCNC: 22 NG/L
WBC # BLD AUTO: 11.2 10E3/UL (ref 4–11)

## 2023-11-24 PROCEDURE — 85730 THROMBOPLASTIN TIME PARTIAL: CPT | Performed by: PREVENTIVE MEDICINE

## 2023-11-24 PROCEDURE — 93005 ELECTROCARDIOGRAM TRACING: CPT

## 2023-11-24 PROCEDURE — 83880 ASSAY OF NATRIURETIC PEPTIDE: CPT | Performed by: PREVENTIVE MEDICINE

## 2023-11-24 PROCEDURE — 250N000011 HC RX IP 250 OP 636: Performed by: PREVENTIVE MEDICINE

## 2023-11-24 PROCEDURE — 250N000012 HC RX MED GY IP 250 OP 636 PS 637: Performed by: STUDENT IN AN ORGANIZED HEALTH CARE EDUCATION/TRAINING PROGRAM

## 2023-11-24 PROCEDURE — 71275 CT ANGIOGRAPHY CHEST: CPT | Mod: MF

## 2023-11-24 PROCEDURE — 96365 THER/PROPH/DIAG IV INF INIT: CPT | Mod: 59

## 2023-11-24 PROCEDURE — 120N000001 HC R&B MED SURG/OB

## 2023-11-24 PROCEDURE — 96376 TX/PRO/DX INJ SAME DRUG ADON: CPT | Mod: 59

## 2023-11-24 PROCEDURE — 84484 ASSAY OF TROPONIN QUANT: CPT | Performed by: PREVENTIVE MEDICINE

## 2023-11-24 PROCEDURE — 80053 COMPREHEN METABOLIC PANEL: CPT | Performed by: PREVENTIVE MEDICINE

## 2023-11-24 PROCEDURE — 258N000003 HC RX IP 258 OP 636: Performed by: STUDENT IN AN ORGANIZED HEALTH CARE EDUCATION/TRAINING PROGRAM

## 2023-11-24 PROCEDURE — 250N000009 HC RX 250: Performed by: STUDENT IN AN ORGANIZED HEALTH CARE EDUCATION/TRAINING PROGRAM

## 2023-11-24 PROCEDURE — 87804 INFLUENZA ASSAY W/OPTIC: CPT | Performed by: PREVENTIVE MEDICINE

## 2023-11-24 PROCEDURE — 85379 FIBRIN DEGRADATION QUANT: CPT | Performed by: PREVENTIVE MEDICINE

## 2023-11-24 PROCEDURE — 250N000011 HC RX IP 250 OP 636: Performed by: STUDENT IN AN ORGANIZED HEALTH CARE EDUCATION/TRAINING PROGRAM

## 2023-11-24 PROCEDURE — 250N000009 HC RX 250: Performed by: PREVENTIVE MEDICINE

## 2023-11-24 PROCEDURE — 85610 PROTHROMBIN TIME: CPT | Performed by: PREVENTIVE MEDICINE

## 2023-11-24 PROCEDURE — 71046 X-RAY EXAM CHEST 2 VIEWS: CPT | Mod: TC | Performed by: RADIOLOGY

## 2023-11-24 PROCEDURE — 99222 1ST HOSP IP/OBS MODERATE 55: CPT | Performed by: HOSPITALIST

## 2023-11-24 PROCEDURE — 84484 ASSAY OF TROPONIN QUANT: CPT | Performed by: STUDENT IN AN ORGANIZED HEALTH CARE EDUCATION/TRAINING PROGRAM

## 2023-11-24 PROCEDURE — 84145 PROCALCITONIN (PCT): CPT | Performed by: PREVENTIVE MEDICINE

## 2023-11-24 PROCEDURE — 250N000011 HC RX IP 250 OP 636: Mod: JZ | Performed by: HOSPITALIST

## 2023-11-24 PROCEDURE — 99285 EMERGENCY DEPT VISIT HI MDM: CPT | Mod: 25

## 2023-11-24 PROCEDURE — 250N000013 HC RX MED GY IP 250 OP 250 PS 637: Performed by: HOSPITALIST

## 2023-11-24 PROCEDURE — 85025 COMPLETE CBC W/AUTO DIFF WBC: CPT | Performed by: PREVENTIVE MEDICINE

## 2023-11-24 PROCEDURE — 36415 COLL VENOUS BLD VENIPUNCTURE: CPT | Performed by: STUDENT IN AN ORGANIZED HEALTH CARE EDUCATION/TRAINING PROGRAM

## 2023-11-24 PROCEDURE — 36415 COLL VENOUS BLD VENIPUNCTURE: CPT | Performed by: PREVENTIVE MEDICINE

## 2023-11-24 PROCEDURE — 99207 PR FIRST ORDER ACUTE REFERRAL: CPT | Performed by: NURSE PRACTITIONER

## 2023-11-24 PROCEDURE — 94640 AIRWAY INHALATION TREATMENT: CPT

## 2023-11-24 PROCEDURE — 96375 TX/PRO/DX INJ NEW DRUG ADDON: CPT

## 2023-11-24 PROCEDURE — 93000 ELECTROCARDIOGRAM COMPLETE: CPT | Performed by: NURSE PRACTITIONER

## 2023-11-24 PROCEDURE — 87635 SARS-COV-2 COVID-19 AMP PRB: CPT | Performed by: PREVENTIVE MEDICINE

## 2023-11-24 PROCEDURE — 99207 PR INPT ADMISSION FROM CLINIC: CPT | Performed by: PREVENTIVE MEDICINE

## 2023-11-24 RX ORDER — IPRATROPIUM BROMIDE AND ALBUTEROL SULFATE 2.5; .5 MG/3ML; MG/3ML
3 SOLUTION RESPIRATORY (INHALATION)
Status: COMPLETED | OUTPATIENT
Start: 2023-11-24 | End: 2023-11-24

## 2023-11-24 RX ORDER — PREDNISONE 20 MG/1
40 TABLET ORAL DAILY
Status: DISCONTINUED | OUTPATIENT
Start: 2023-11-25 | End: 2023-11-26 | Stop reason: HOSPADM

## 2023-11-24 RX ORDER — CEFTRIAXONE 1 G/1
1 INJECTION, POWDER, FOR SOLUTION INTRAMUSCULAR; INTRAVENOUS ONCE
Status: COMPLETED | OUTPATIENT
Start: 2023-11-24 | End: 2023-11-24

## 2023-11-24 RX ORDER — AMLODIPINE BESYLATE 2.5 MG/1
2.5 TABLET ORAL EVERY EVENING
Status: DISCONTINUED | OUTPATIENT
Start: 2023-11-24 | End: 2023-11-26 | Stop reason: HOSPADM

## 2023-11-24 RX ORDER — BUDESONIDE AND FORMOTEROL FUMARATE DIHYDRATE 80; 4.5 UG/1; UG/1
2 AEROSOL RESPIRATORY (INHALATION) 2 TIMES DAILY
Qty: 10.2 G | Refills: 0 | Status: CANCELLED | OUTPATIENT
Start: 2023-11-24

## 2023-11-24 RX ORDER — ALBUTEROL SULFATE 0.83 MG/ML
SOLUTION RESPIRATORY (INHALATION)
Status: COMPLETED
Start: 2023-11-24 | End: 2023-11-24

## 2023-11-24 RX ORDER — AMOXICILLIN 250 MG
1 CAPSULE ORAL 2 TIMES DAILY PRN
Status: DISCONTINUED | OUTPATIENT
Start: 2023-11-24 | End: 2023-11-26 | Stop reason: HOSPADM

## 2023-11-24 RX ORDER — ASPIRIN 81 MG/1
81 TABLET, CHEWABLE ORAL DAILY
Status: DISCONTINUED | OUTPATIENT
Start: 2023-11-25 | End: 2023-11-26 | Stop reason: HOSPADM

## 2023-11-24 RX ORDER — CALCIUM CARBONATE 500 MG/1
1000 TABLET, CHEWABLE ORAL 4 TIMES DAILY PRN
Status: DISCONTINUED | OUTPATIENT
Start: 2023-11-24 | End: 2023-11-26 | Stop reason: HOSPADM

## 2023-11-24 RX ORDER — AZITHROMYCIN 500 MG/5ML
500 INJECTION, POWDER, LYOPHILIZED, FOR SOLUTION INTRAVENOUS ONCE
Status: COMPLETED | OUTPATIENT
Start: 2023-11-24 | End: 2023-11-24

## 2023-11-24 RX ORDER — ALBUTEROL SULFATE 0.83 MG/ML
3 SOLUTION RESPIRATORY (INHALATION) 4 TIMES DAILY
Status: DISCONTINUED | OUTPATIENT
Start: 2023-11-24 | End: 2023-11-26 | Stop reason: HOSPADM

## 2023-11-24 RX ORDER — ACETAMINOPHEN 325 MG/1
650 TABLET ORAL EVERY 4 HOURS PRN
Status: DISCONTINUED | OUTPATIENT
Start: 2023-11-24 | End: 2023-11-26 | Stop reason: HOSPADM

## 2023-11-24 RX ORDER — PREDNISONE 20 MG/1
40 TABLET ORAL ONCE
Status: COMPLETED | OUTPATIENT
Start: 2023-11-24 | End: 2023-11-24

## 2023-11-24 RX ORDER — GUAIFENESIN 200 MG/10ML
200 LIQUID ORAL EVERY 4 HOURS PRN
Status: DISCONTINUED | OUTPATIENT
Start: 2023-11-24 | End: 2023-11-26 | Stop reason: HOSPADM

## 2023-11-24 RX ORDER — ALBUTEROL SULFATE 0.83 MG/ML
3 SOLUTION RESPIRATORY (INHALATION)
Status: DISCONTINUED | OUTPATIENT
Start: 2023-11-24 | End: 2023-11-26 | Stop reason: HOSPADM

## 2023-11-24 RX ORDER — ACETAMINOPHEN 650 MG/1
650 SUPPOSITORY RECTAL EVERY 4 HOURS PRN
Status: DISCONTINUED | OUTPATIENT
Start: 2023-11-24 | End: 2023-11-26 | Stop reason: HOSPADM

## 2023-11-24 RX ORDER — AZITHROMYCIN 250 MG/1
TABLET, FILM COATED ORAL
Qty: 6 TABLET | Refills: 0 | Status: CANCELLED | OUTPATIENT
Start: 2023-11-24 | End: 2023-11-29

## 2023-11-24 RX ORDER — LIDOCAINE 40 MG/G
CREAM TOPICAL
Status: DISCONTINUED | OUTPATIENT
Start: 2023-11-24 | End: 2023-11-26 | Stop reason: HOSPADM

## 2023-11-24 RX ORDER — IOPAMIDOL 755 MG/ML
67 INJECTION, SOLUTION INTRAVASCULAR ONCE
Status: COMPLETED | OUTPATIENT
Start: 2023-11-24 | End: 2023-11-24

## 2023-11-24 RX ORDER — LISINOPRIL 40 MG/1
40 TABLET ORAL EVERY EVENING
Status: DISCONTINUED | OUTPATIENT
Start: 2023-11-24 | End: 2023-11-26 | Stop reason: HOSPADM

## 2023-11-24 RX ORDER — AMOXICILLIN 250 MG
2 CAPSULE ORAL 2 TIMES DAILY PRN
Status: DISCONTINUED | OUTPATIENT
Start: 2023-11-24 | End: 2023-11-26 | Stop reason: HOSPADM

## 2023-11-24 RX ORDER — CEFTRIAXONE 2 G/1
2 INJECTION, POWDER, FOR SOLUTION INTRAMUSCULAR; INTRAVENOUS EVERY 24 HOURS
Qty: 140 ML | Refills: 0 | Status: DISCONTINUED | OUTPATIENT
Start: 2023-11-25 | End: 2023-11-26 | Stop reason: HOSPADM

## 2023-11-24 RX ADMIN — AZITHROMYCIN MONOHYDRATE 500 MG: 500 INJECTION, POWDER, LYOPHILIZED, FOR SOLUTION INTRAVENOUS at 19:38

## 2023-11-24 RX ADMIN — IOPAMIDOL 67 ML: 755 INJECTION, SOLUTION INTRAVENOUS at 13:10

## 2023-11-24 RX ADMIN — PREDNISONE 40 MG: 20 TABLET ORAL at 19:39

## 2023-11-24 RX ADMIN — LISINOPRIL 40 MG: 40 TABLET ORAL at 23:53

## 2023-11-24 RX ADMIN — CEFTRIAXONE 1 G: 1 INJECTION, POWDER, FOR SOLUTION INTRAMUSCULAR; INTRAVENOUS at 21:44

## 2023-11-24 RX ADMIN — IPRATROPIUM BROMIDE AND ALBUTEROL SULFATE 3 ML: .5; 3 SOLUTION RESPIRATORY (INHALATION) at 18:12

## 2023-11-24 RX ADMIN — AMLODIPINE BESYLATE 2.5 MG: 2.5 TABLET ORAL at 23:53

## 2023-11-24 RX ADMIN — SODIUM CHLORIDE 81 ML: 9 INJECTION, SOLUTION INTRAVENOUS at 13:10

## 2023-11-24 RX ADMIN — CEFTRIAXONE 1 G: 1 INJECTION, POWDER, FOR SOLUTION INTRAMUSCULAR; INTRAVENOUS at 18:37

## 2023-11-24 ASSESSMENT — ACTIVITIES OF DAILY LIVING (ADL)
ADLS_ACUITY_SCORE: 35

## 2023-11-24 ASSESSMENT — ENCOUNTER SYMPTOMS: SHORTNESS OF BREATH: 1

## 2023-11-24 NOTE — TELEPHONE ENCOUNTER
"Situation: Patient scheduled visit today on Mychart for : \"cold symptoms that are persistent: fever, shortness of breathe\".   Provider asking for patient to be triaged.       Attempt # 1    Called # [phone]     Left a non detailed VM to call back at [phone] and ask for any available Triage Nurse.    MORA CAMERON RN on 11/24/2023 at 9:01 AM   [facility]      "

## 2023-11-24 NOTE — PROGRESS NOTES
Assessment & Plan     Shortness of breath  Symptoms and exam seem most consistent with pneumonia.  Possible viral etiology initially now turned to bacterial.  Chest x-ray obtained and noted to have a possible right nodular density which was confirmed with radiology read.  Discussed at length that this needs further workup with CT with contrast.  Option of completing today versus outpatient next week.  Has her daughter-in-law with her today and they would like to pursue this additional imaging today which is reasonable.  EKG without acute heart change no chest pain no leg swelling.  Called [facility] for referral and they graciously accept.   Patient given address and number to location.   Advised to go straight there. No food to be consumed.   Follow up and plan of care based on [facility] findings.    Red flag symptoms discussed and if these occur present to the emergency room or call 911.   Cami and her daughter in law verbalize understanding of plan of care and are in agreement.      - XR Chest 2 Views  - EKG 12-lead complete w/read - Clinics  - Referral to Acute and Diagnostic Services (Day of diagnostic / First order acute)    Abnormality of lung on chest x-ray    - Referral to Acute and Diagnostic Services (Day of diagnostic / First order acute)    Acute cough    - Referral to Acute and Diagnostic Services (Day of diagnostic / First order acute)    Lung field abnormal finding on examination    - Referral to Acute and Diagnostic Services (Day of diagnostic / First order acute)    Seasonal asthma    - Referral to Acute and Diagnostic Services (Day of diagnostic / First order acute)    Return in about 5 days (around 11/29/2023) for Recheck.      LYNDSEY Mayorga [facility]   Cami is a 78 year old, presenting for the following health issues:  URI        11/24/2023    10:10 AM   Additional Questions   Roomed by Sindhu KHAN   Accompanied by Daughter in law        History of Present  Illness       Reason for visit:  Persistent cold symptoms  Symptom onset:  3-7 days ago  Symptoms include:  Fever, cough, shortness of breathe, difficulty sleeping  Symptom intensity:  Moderate  Symptom progression:  Worsening  Had these symptoms before:  Yes  Has tried/received treatment for these symptoms:  No  What makes it better:  Sleeping    She eats 0-1 servings of fruits and vegetables daily.She consumes 0 sweetened beverage(s) daily.She exercises with enough effort to increase her heart rate 9 or less minutes per day.  She exercises with enough effort to increase her heart rate 3 or less days per week.   She is taking medications regularly.     Acute Illness  Acute illness concerns: Fever Cough Shortness of breathe   Onset/Duration: 11/10/2023  Symptoms:  Fever: YES  Chills/Sweats: YES- night sweats  Headache (location?): No  Sinus Pressure: No  Conjunctivitis:  No  Ear Pain: no  Rhinorrhea: No  Congestion: YES  Sore Throat: YES  Cough: YES-productive of yellow sputum  Wheeze: YES  Decreased Appetite: YES  Nausea: No  Vomiting: No  Diarrhea: YES  Dysuria/Freq.: No  Dysuria or Hematuria: No  Fatigue/Achiness: YES  Sick/Strep Exposure: No  Therapies tried and outcome: Dayquil mucinex     Very fatigued slept all day yesterday. Hard to sleep last night due to cough.  Denies chest pain.  Having night sweats.  Shortness of breath described when exerting herself.  No leg swelling.  Reports non-smoker history was exposed to secondhand smoke in spouse who quit approximately 45 years ago.      Referral to Acute and Diagnostic Services    The [facility] has been contacted at [phone] ([place]) to confirm patient acceptance. The transition to Acute & Diagnostic Services Clinic has been discussed with patient, and she agrees with next level of care.  Patient understands that evaluation/treatment at [facility] typically takes significantly longer than in clinic/urgent care (>2  "hours).    Special issues:  Referral placed: Yes  Patient has transportation arranged and will travel to the [facility] without delay: Yes  Patient aware not to eat or drink. Yes    LYNDSEY Mayorga CNP    Review of Systems   Constitutional, HEENT, cardiovascular, pulmonary, GI, , musculoskeletal, neuro, skin, endocrine and psych systems are negative, except as otherwise noted in the HPI.       Objective    /67   Pulse 116   Temp 98.3  F (36.8  C) (Tympanic)   Resp 18   Ht 1.549 m (5' 1\")   Wt 82.3 kg (181 lb 6.4 oz)   LMP  (LMP Unknown)   SpO2 94%   BMI 34.28 kg/m    Body mass index is 34.28 kg/m .  Physical Exam   GENERAL: healthy, alert and no distress, very pleasant  RESP: Deep phlegmy wet cough- no rales; prolonged expiration and intermittent wheeze throughout bilateral rhonchi worse in bases and R>L.  CV: mildly tachy, regular rhythm, normal S1 S2, no S3 or S4, no murmur, click or rub, no peripheral edema and peripheral pulses strong  MS: no gross musculoskeletal defects noted, no edema  PSYCH: mentation appears normal, affect normal/bright  LYMPH: no cervical, supraclavicular, axillary, or inguinal adenopathy    Results for orders placed or performed in visit on 11/24/23   XR Chest 2 Views     Status: None   Result Value Ref Range    Radiologist flags Lung nodule     Narrative    XR CHEST 2 VIEWS  11/24/2023 10:44 AM       INDICATION: Shortness of breath  COMPARISON: None       Impression    IMPRESSION: Nodular opacity right upper lobe. Possible right hilar  lymphadenopathy. Recommend chest CT with contrast for further  evaluation.    [Recommend Follow Up: Lung nodule]    This report will be copied to the [facility] to ensure a  provider acknowledges the finding. [facility] is available Monday  through Friday 8am-3:30 pm.    HERIBERTO COLÓN MD         SYSTEM ID:  [id number]          "

## 2023-11-24 NOTE — PROGRESS NOTES
Assessment & Plan   (R91.1) Pulmonary nodule 1 cm or greater in diameter  (R79.89) Elevated troponin  (J18.9) Pneumonia of both lungs due to infectious organism, unspecified part of lung  (R06.02) SOB (shortness of breath)  (primary encounter diagnosis)  (R05.1) Acute cough  Plan: CT Chest Pulmonary Embolism w Contrast,         Comprehensive metabolic panel, CBC with         platelets and differential, Troponin T, High         Sensitivity, INR, Partial thromboplastin time,         Symptomatic COVID-19 Virus (Coronavirus) by PCR        Nose, Influenza A & B Antigen - Clinic Collect,        D dimer, quantitative, BNP-N terminal pro,         sodium chloride (PF) 0.9% PF flush 3 mL, CT         Chest Pulmonary Embolism w Contrast,         Procalcitonin, CANCELED: EKG 12-lead complete         w/read - Clinics    Patient with elevated trop and b pneumonia - ?viral or atypical  - procal negative, lactate normal, wbc 11K  COVID and flu pending  Referred to ED now for serial trop, cardiac monitoring and further assessment and treatment      112 minutes spent by me on the date of the encounter doing chart review, history and exam, documentation and further activities per the note       See Patient Instructions    No follow-ups on file.    Robbie Still MD  [facility] JARVIS Almanza is a 78 year old, presenting for the following health issues:  Shortness of Breath      Shortness of Breath         Acute Illness  Acute illness concerns: shortness of breath  Onset/Duration: X2 weeks ago  Symptoms:  Fever: YES, temp ssp147.6  Chills/Sweats: YES, very sweaty  Headache (location?): No  Sinus Pressure: YES           Decreased energy level: YES  Conjunctivitis:  YES  Ear Pain: no  Rhinorrhea: YES  Congestion: YES  Sore Throat: YES  Cough: YES - dry cvough and getting waorse   Wheeze: YES           Breathing fast: YES  Decreased Appetite/Intake: YES,  hasn't been eating well  Nausea:  "No  Vomiting: No  Diarrhea: YES  Genitourinary symptoms: No  Progression of symptoms: same worse  Sick/Strep Exposure: N/A  Therapies tried and outcome: dayquil last night,isn't sure if helped    This is an 79 yo female who has congestion and cough for 2 weeks.  Some wheezing and sob now.  Low grade fever.  No cp or pleurisy or leg swelling.  EKG done at clinic this morning was reassuring without ischemic changes.      Review of Systems   Respiratory:  Positive for shortness of breath.      Constitutional, HEENT, cardiovascular, pulmonary, GI, , musculoskeletal, neuro, skin, endocrine and psych systems are negative, except as otherwise noted.      Objective    /84 (BP Location: Left arm, Patient Position: Sitting, Cuff Size: Adult Regular)   Pulse 86   Temp 98.2  F (36.8  C)   Resp 20   Ht 1.549 m (5' 1\")   Wt 82.1 kg (181 lb)   LMP  (LMP Unknown)   SpO2 93%   BMI 34.20 kg/m    Body mass index is 34.2 kg/m .  Physical Exam   GENERAL: healthy, alert and no distress  EYES: Eyes grossly normal to inspection, PERRL and conjunctivae and sclerae normal  HENT: ear canals and TM's normal, nose and mouth without ulcers or lesions  NECK: no adenopathy, no asymmetry, masses, or scars and thyroid normal to palpation  RESP: lungs with bilateral crackles and wheezes  CV: regular rate and rhythm, normal S1 S2, no S3 or S4, no murmur, click or rub, no peripheral edema and peripheral pulses strong  ABDOMEN: soft, nontender, no hepatosplenomegaly, no masses and bowel sounds normal  MS: no gross musculoskeletal defects noted, no edema  SKIN: no suspicious lesions or rashes  NEURO: Normal strength and tone, mentation intact and speech normal  PSYCH: mentation appears normal, affect normal/bright      Results for orders placed or performed during the hospital encounter of 11/24/23   CT Chest Pulmonary Embolism w Contrast     Status: None (Preliminary result)    Narrative    CT CHEST PULMONARY EMBOLISM WITH CONTRAST " 11/24/2023 1:19 PM    CLINICAL HISTORY: SOB (shortness of breath); Acute cough.    TECHNIQUE: CT angiogram chest during arterial phase injection IV  contrast. 2D and 3D MIP reconstructions were performed by the CT  technologist. Dose reduction techniques were used.     CONTRAST: 67mL Isovue-370    COMPARISON: Chest x-ray earlier today.    FINDINGS:  ANGIOGRAM CHEST: Pulmonary arteries are normal caliber and negative  for pulmonary emboli. Thoracic aorta is negative for dissection. No CT  evidence of right heart strain.    LUNGS AND PLEURA: There is an irregular 1.4 cm nodule in the superior  segment of the right lower lobe with multiple surrounding satellite  micronodules. There is mucous plugging central to the nodule.  Groundglass peribronchial infiltrates in both upper lobes, and to a  lesser extent in the lower lobes.    MEDIASTINUM/AXILLAE: Mild mediastinal and right hilar lymphadenopathy,  noncalcified. Moderate coronary artery calcification.    UPPER ABDOMEN: Normal.    MUSCULOSKELETAL: Normal.      Impression    IMPRESSION:  1.  No evidence of pulmonary embolus.  2.  Bilateral upper and lower lobe pneumonia.  3.  1.5 cm nodule right upper lobe has some features suggestive of  acute granulomatous disease. Three-month follow-up chest CT is  recommended.  4.  Mild right hilar and mediastinal lymphadenopathy can also be  reassessed on follow-up.   Results for orders placed or performed in visit on 11/24/23   Comprehensive metabolic panel     Status: Abnormal   Result Value Ref Range    Sodium 134 (L) 135 - 145 mmol/L    Potassium 4.0 3.4 - 5.3 mmol/L    Carbon Dioxide (CO2) 21 (L) 22 - 29 mmol/L    Anion Gap 16 (H) 7 - 15 mmol/L    Urea Nitrogen 19.9 8.0 - 23.0 mg/dL    Creatinine 0.89 0.51 - 0.95 mg/dL    GFR Estimate 65 >60 mL/min/1.73m2    Calcium 9.1 8.8 - 10.2 mg/dL    Chloride 97 (L) 98 - 107 mmol/L    Glucose 95 70 - 99 mg/dL    Alkaline Phosphatase 83 40 - 150 U/L    AST 23 0 - 45 U/L    ALT 15 0 - 50  U/L    Protein Total 7.5 6.4 - 8.3 g/dL    Albumin 4.0 3.5 - 5.2 g/dL    Bilirubin Total 0.3 <=1.2 mg/dL   Troponin T, High Sensitivity     Status: Abnormal   Result Value Ref Range    Troponin T, High Sensitivity 22 (H) <=14 ng/L   INR     Status: Normal   Result Value Ref Range    INR 1.10 0.85 - 1.15   Partial thromboplastin time     Status: Normal   Result Value Ref Range    aPTT 33 22 - 38 Seconds   D dimer, quantitative     Status: Abnormal   Result Value Ref Range    D-Dimer Quantitative 1.50 (H) 0.00 - 0.50 ug/mL FEU    Narrative    This D-dimer assay is intended for use in conjunction with a clinical pretest probability assessment model to exclude pulmonary embolism (PE) and deep venous thrombosis (DVT) in outpatients suspected of PE or DVT. The cut-off value is 0.50 ug/mL FEU.    For patients 50 years of age or older, the application of age-adjusted cut-off values for D-Dimer may increase the specificity without significant effect on sensitivity. The literature suggested calculation age adjusted cut-off in ug/L = age in years x 10 ug/L. The results in this laboratory are reported as ug/mL rather than ug/L. The calculation for age adjusted cut off in ug/mL= age in years x 0.01 ug/mL. For example, the cut off for a 76 year old male is 76 x 0.01 ug/mL = 0.76 ug/mL (760 ug/L).    M Arturo et al. Age adjusted D-dimer cut-off levels to rule out pulmonary embolism: The ADJUST-PE Study. ABHINAV 2014;311:7398-6873.; HJ Emily et al. Diagnostic accuracy of conventional or age adjusted D-dimer cutoff values in older patients with suspected venous thromboembolism. Systemic review and meta-analysis. BMJ 2013:346:f2492.   BNP-N terminal pro     Status: Normal   Result Value Ref Range    N Terminal Pro BNP Outpatient 429 0 - 1,800 pg/mL   CBC with platelets and differential     Status: Abnormal   Result Value Ref Range    WBC Count 11.2 (H) 4.0 - 11.0 10e3/uL    RBC Count 4.09 3.80 - 5.20 10e6/uL    Hemoglobin 12.7 11.7  - 15.7 g/dL    Hematocrit 38.3 35.0 - 47.0 %    MCV 94 78 - 100 fL    MCH 31.1 26.5 - 33.0 pg    MCHC 33.2 31.5 - 36.5 g/dL    RDW 13.2 10.0 - 15.0 %    Platelet Count 282 150 - 450 10e3/uL    % Neutrophils 76 %    % Lymphocytes 16 %    % Monocytes 7 %    % Eosinophils 1 %    % Basophils 0 %    % Immature Granulocytes 0 %    NRBCs per 100 WBC 0 <1 /100    Absolute Neutrophils 8.5 (H) 1.6 - 8.3 10e3/uL    Absolute Lymphocytes 1.8 0.8 - 5.3 10e3/uL    Absolute Monocytes 0.7 0.0 - 1.3 10e3/uL    Absolute Eosinophils 0.1 0.0 - 0.7 10e3/uL    Absolute Basophils 0.0 0.0 - 0.2 10e3/uL    Absolute Immature Granulocytes 0.0 <=0.4 10e3/uL    Absolute NRBCs 0.0 10e3/uL   Procalcitonin     Status: Normal   Result Value Ref Range    Procalcitonin 0.30 <0.50 ng/mL   CBC with platelets and differential     Status: Abnormal    Narrative    The following orders were created for panel order CBC with platelets and differential.  Procedure                               Abnormality         Status                     ---------                               -----------         ------                     CBC with platelets and d...[[id number]]  Abnormal            Final result                 Please view results for these tests on the individual orders.   Results for orders placed or performed in visit on 11/24/23   XR Chest 2 Views     Status: None   Result Value Ref Range    Radiologist flags Lung nodule     Narrative    XR CHEST 2 VIEWS  11/24/2023 10:44 AM       INDICATION: Shortness of breath  COMPARISON: None       Impression    IMPRESSION: Nodular opacity right upper lobe. Possible right hilar  lymphadenopathy. Recommend chest CT with contrast for further  evaluation.    [Recommend Follow Up: Lung nodule]    This report will be copied to the [facility] to ensure a  provider acknowledges the finding. [facility] is available Monday  through Friday 8am-3:30 pm.    HERIBERTO COLÓN MD         SYSTEM ID:  [id number]

## 2023-11-24 NOTE — ED TRIAGE NOTES
PL clinic for cough and SOB. Had CT done in [address] - positive maryam pneumonia and positive trop. Pt denies CP.

## 2023-11-25 LAB
ANION GAP SERPL CALCULATED.3IONS-SCNC: 15 MMOL/L (ref 7–15)
BUN SERPL-MCNC: 15.9 MG/DL (ref 8–23)
CALCIUM SERPL-MCNC: 8.9 MG/DL (ref 8.8–10.2)
CHLORIDE SERPL-SCNC: 99 MMOL/L (ref 98–107)
CREAT SERPL-MCNC: 0.69 MG/DL (ref 0.51–0.95)
DEPRECATED HCO3 PLAS-SCNC: 22 MMOL/L (ref 22–29)
EGFRCR SERPLBLD CKD-EPI 2021: 87 ML/MIN/1.73M2
ERYTHROCYTE [DISTWIDTH] IN BLOOD BY AUTOMATED COUNT: 13 % (ref 10–15)
GLUCOSE SERPL-MCNC: 133 MG/DL (ref 70–99)
HCT VFR BLD AUTO: 39 % (ref 35–47)
HGB BLD-MCNC: 12.8 G/DL (ref 11.7–15.7)
MCH RBC QN AUTO: 31.2 PG (ref 26.5–33)
MCHC RBC AUTO-ENTMCNC: 32.8 G/DL (ref 31.5–36.5)
MCV RBC AUTO: 95 FL (ref 78–100)
PLATELET # BLD AUTO: 279 10E3/UL (ref 150–450)
POTASSIUM SERPL-SCNC: 4.3 MMOL/L (ref 3.4–5.3)
RBC # BLD AUTO: 4.1 10E6/UL (ref 3.8–5.2)
SODIUM SERPL-SCNC: 136 MMOL/L (ref 135–145)
WBC # BLD AUTO: 7.4 10E3/UL (ref 4–11)

## 2023-11-25 PROCEDURE — G0378 HOSPITAL OBSERVATION PER HR: HCPCS

## 2023-11-25 PROCEDURE — 250N000012 HC RX MED GY IP 250 OP 636 PS 637: Performed by: HOSPITALIST

## 2023-11-25 PROCEDURE — 96376 TX/PRO/DX INJ SAME DRUG ADON: CPT

## 2023-11-25 PROCEDURE — 258N000003 HC RX IP 258 OP 636: Performed by: HOSPITALIST

## 2023-11-25 PROCEDURE — 82310 ASSAY OF CALCIUM: CPT | Performed by: HOSPITALIST

## 2023-11-25 PROCEDURE — 85027 COMPLETE CBC AUTOMATED: CPT | Performed by: HOSPITALIST

## 2023-11-25 PROCEDURE — 94640 AIRWAY INHALATION TREATMENT: CPT

## 2023-11-25 PROCEDURE — 94640 AIRWAY INHALATION TREATMENT: CPT | Mod: 76

## 2023-11-25 PROCEDURE — 250N000013 HC RX MED GY IP 250 OP 250 PS 637: Performed by: HOSPITALIST

## 2023-11-25 PROCEDURE — 250N000009 HC RX 250: Performed by: HOSPITALIST

## 2023-11-25 PROCEDURE — 36415 COLL VENOUS BLD VENIPUNCTURE: CPT | Performed by: HOSPITALIST

## 2023-11-25 PROCEDURE — 250N000011 HC RX IP 250 OP 636: Performed by: HOSPITALIST

## 2023-11-25 PROCEDURE — 99233 SBSQ HOSP IP/OBS HIGH 50: CPT | Performed by: INTERNAL MEDICINE

## 2023-11-25 PROCEDURE — 999N000157 HC STATISTIC RCP TIME EA 10 MIN

## 2023-11-25 RX ORDER — FLUTICASONE PROPIONATE 110 UG/1
1 AEROSOL, METERED RESPIRATORY (INHALATION) 2 TIMES DAILY
Qty: 110 G | Refills: 0 | Status: SHIPPED | OUTPATIENT
Start: 2023-11-25 | End: 2023-12-06

## 2023-11-25 RX ORDER — AZITHROMYCIN 250 MG/1
250 TABLET, FILM COATED ORAL DAILY
Qty: 4 TABLET | Refills: 0 | Status: SHIPPED | OUTPATIENT
Start: 2023-11-25 | End: 2023-11-29

## 2023-11-25 RX ORDER — PREDNISONE 20 MG/1
40 TABLET ORAL DAILY
Qty: 8 TABLET | Refills: 0 | Status: SHIPPED | OUTPATIENT
Start: 2023-11-26 | End: 2023-11-30

## 2023-11-25 RX ORDER — ALBUTEROL SULFATE 90 UG/1
AEROSOL, METERED RESPIRATORY (INHALATION)
Qty: 18 G | Refills: 0 | Status: SHIPPED | OUTPATIENT
Start: 2023-11-25 | End: 2023-12-06

## 2023-11-25 RX ORDER — CEFUROXIME AXETIL 500 MG/1
500 TABLET ORAL 2 TIMES DAILY
Qty: 10 TABLET | Refills: 0 | Status: SHIPPED | OUTPATIENT
Start: 2023-11-25 | End: 2023-11-30

## 2023-11-25 RX ADMIN — ASPIRIN 81 MG 81 MG: 81 TABLET ORAL at 08:27

## 2023-11-25 RX ADMIN — CEFTRIAXONE 2 G: 2 INJECTION, POWDER, FOR SOLUTION INTRAMUSCULAR; INTRAVENOUS at 18:05

## 2023-11-25 RX ADMIN — ALBUTEROL SULFATE 2.5 MG: 2.5 SOLUTION RESPIRATORY (INHALATION) at 07:20

## 2023-11-25 RX ADMIN — ALBUTEROL SULFATE 2.5 MG: 2.5 SOLUTION RESPIRATORY (INHALATION) at 20:28

## 2023-11-25 RX ADMIN — PREDNISONE 40 MG: 20 TABLET ORAL at 08:27

## 2023-11-25 RX ADMIN — ESCITALOPRAM OXALATE 30 MG: 20 TABLET ORAL at 08:27

## 2023-11-25 RX ADMIN — Medication 1 MG: at 22:16

## 2023-11-25 RX ADMIN — ALBUTEROL SULFATE 2.5 MG: 2.5 SOLUTION RESPIRATORY (INHALATION) at 14:48

## 2023-11-25 RX ADMIN — ALBUTEROL SULFATE 2.5 MG: 2.5 SOLUTION RESPIRATORY (INHALATION) at 11:28

## 2023-11-25 RX ADMIN — AMLODIPINE BESYLATE 2.5 MG: 2.5 TABLET ORAL at 20:10

## 2023-11-25 RX ADMIN — LISINOPRIL 40 MG: 40 TABLET ORAL at 20:10

## 2023-11-25 RX ADMIN — AZITHROMYCIN MONOHYDRATE 250 MG: 500 INJECTION, POWDER, LYOPHILIZED, FOR SOLUTION INTRAVENOUS at 16:57

## 2023-11-25 ASSESSMENT — ACTIVITIES OF DAILY LIVING (ADL)
ADLS_ACUITY_SCORE: 35
ADLS_ACUITY_SCORE: 35
ADLS_ACUITY_SCORE: 26
ADLS_ACUITY_SCORE: 35
ADLS_ACUITY_SCORE: 26
ADLS_ACUITY_SCORE: 35
ADLS_ACUITY_SCORE: 26

## 2023-11-25 NOTE — UTILIZATION REVIEW
"      Admission Status; Secondary Review Determination         Under the authority of the Utilization Management Committee, the utilization review process indicated a secondary review on the above patient.  The review outcome is based on review of the medical records, discussions with staff, and applying clinical experience noted on the date of the review.          (x) Observation Status Appropriate - This patient does not meet hospital inpatient criteria and is placed in observation status. If this patient's primary payer is Medicare and was admitted as an inpatient, Condition Code 44 should be used and patient status changed to \"observation\".     RATIONALE FOR DETERMINATION           The patient is  is a 78 year old female w/PMH asthma, HTN, obesity, psoriasis, depression, asymptomatic stenosis R posterior cerebral artery who presented 2023 with persistent cough and wheezing with shortness of breath.  She did have albuterol inhaler at home but wonders if that was .  She is not on a controller inhaler.  In emergency room she appeared uncomfortable and short of breath, but not hypoxic.  White blood count was elevated 11.2 but she was otherwise afebrile.  Influenza, RSV and COVID testing was negative.   The chest CT was negative for PE but did show bilateral upper and lower lobe infiltrates suspected to be community-acquired pneumonia eventually superimposed on a preceding viral URI.      Started on azithromycin, ceftriaxone and IV Solu-Medrol in the ER.     On , the patient feels dramatically improved, she has been ambulatory around the room and in the bathroom. She does still have a cough and coarse expiratory wheezes.  Likely discharge home tomorrow with oral prednisone, Ceftin and azithromycin.     The severity of illness, intensity of service provided, expected LOS and risk for adverse outcome make the care appropriate for further observation; however, doesn't meet criteria for hospital " inpatient admission. Dr. Vincent  notified of this determination.    This document was produced using voice recognition software.      The information on this document is developed by the utilization review team in order for the business office to ensure compliance.  This only denotes the appropriateness of proper admission status and does not reflect the quality of care rendered.         The definitions of Inpatient Status and Observation Status used in making the determination above are those provided in the CMS Coverage Manual, Chapter 1 and Chapter 6, section 70.4.      Sincerely,     CESAR STARKS MD   Utilization Review  Physician Advisor  [facility]

## 2023-11-25 NOTE — ED NOTES
MD at bedside for assessment. Pt reports that she does not wish to stay in the hospital overnight. MD advised writer to hold off on IV antibiotics incase the patient does go home as he would likely change the antibiotics prescribed.

## 2023-11-25 NOTE — PLAN OF CARE
"  Problem: Adult Inpatient Plan of Care  Goal: Plan of Care Review  Description: The Plan of Care Review/Shift note should be completed every shift.  The Outcome Evaluation is a brief statement about your assessment that the patient is improving, declining, or no change.  This information will be displayed automatically on your shift  note.  Outcome: Progressing  Goal: Patient-Specific Goal (Individualized)  Description: You can add care plan individualizations to a care plan. Examples of Individualization might be:  \"Parent requests to be called daily at 9am for status\", \"I have a hard time hearing out of my right ear\", or \"Do not touch me to wake me up as it startles  me\".  Outcome: Progressing  Goal: Absence of Hospital-Acquired Illness or Injury  Outcome: Progressing  Intervention: Identify and Manage Fall Risk  Recent Flowsheet Documentation  Taken 11/24/2023 4671 by Clovis Bacon, RN  Safety Promotion/Fall Prevention:   activity supervised   assistive device/personal items within reach   clutter free environment maintained   increased rounding and observation   lighting adjusted   mobility aid in reach   nonskid shoes/slippers when out of bed   patient and family education   room door open   room near nurse's station   supervised activity  Goal: Optimal Comfort and Wellbeing  Outcome: Progressing  Goal: Readiness for Transition of Care  Outcome: Progressing  Intervention: Mutually Develop Transition Plan  Recent Flowsheet Documentation  Taken 11/24/2023 2200 by Clovis Bacon, RN  Equipment Currently Used at Home: cane, straight     Problem: Pain Acute  Goal: Optimal Pain Control and Function  Outcome: Progressing   Goal Outcome Evaluation:                        "

## 2023-11-25 NOTE — PROGRESS NOTES
[facility]  Hospitalist Progress Note  Harry Vincent MD 23    Reason for Stay (Diagnosis): asthma         Assessment and Plan:      Summary of Stay: Cami Turner is a 78 year old female w/PMH asthma, HTN, obesity, psoriasis, depression, asymptomatic stenosis R posterior cerebral artery who presented 2023 with persistent cough and wheezing with shortness of breath.  She did have albuterol inhaler at home but wonders if that was .  She is not on a controller inhaler.  Here in the emergency room she was not objectively hypoxic but appeared uncomfortable and short of breath.  White blood count was elevated 11.2 but she was otherwise afebrile.  Influenza, RSV and COVID testing was negative.  She underwent CT PE protocol which was actually prior to arriving in the ER and done through her primary clinic.  This was negative for PE but did show bilateral upper and lower lobe infiltrates suspected to be community-acquired pneumonia eventually superimposed on a preceding viral URI.      Started on azithromycin, ceftriaxone and IV Solu-Medrol in the ER.     I assumed care on .  Cami has me she feels dramatically improved today.  She has been ambulatory around the room and in the bathroom.  She does still have a cough and coarse expiratory wheezes.  Likely discharge home tomorrow with oral prednisone, Ceftin and azithromycin.  I am also refilling an albuterol inhaler and prescribing an inhaled corticosteroid for her as well at least till she can follow-up with her primary care doctor and revisit her asthma plan.     CAP  Asthma exacerbation  -2 weeks of URI type symptoms without improvement with supportive care. Went to PCP today and had CTA negative for PE but bilateral upper and lower pneumonias noted. Suspect this is CAP superimposed on viral URI. Not hypoxic but wheezy with ronchi and poor air entry, has hx of asthma with increased use of inhaler recently indicating  "exacerbation  -Symptomatically much improved but still bronchospastic.  continue oral prednisone, ceftriaxone and azithromycin, albuterol and initiation of a long-acting corticosteroid, likely fluticasone upon discharge.     Detectable troponin  -likely 2/2 above, no CP or evidence of ACS. Initial 22 then 18  -no further workup indicated     Hx HTN, obesity, psoriasis, depression, focal stenosis R cerebral artery (asymptomatic)  -resume home ASA, lexapro, lisinopril, norvasc    DVT Prophylaxis: Pneumatic Compression Devices  Code Status: Full Code  Discharge Dispo/Date: tomorrow to home    Clinically Significant Risk Factors Present on Admission                # Drug Induced Platelet Defect: home medication list includes an antiplatelet medication   # Hypertension: Noted on problem list      # Obesity: Estimated body mass index is 34.2 kg/m  as calculated from the following:    Height as of an earlier encounter on 11/24/23: 1.549 m (5' 1\").    Weight as of an earlier encounter on 11/24/23: 82.1 kg (181 lb).                    Interval History (Subjective):      Feels much better but still very bronchospastic  Mobilizing  Likely home tomorrow pending further improvement.                  Physical Exam:      Last Vital Signs:  BP (!) 141/61 (BP Location: Right arm)   Pulse 71   Temp 97.1  F (36.2  C) (Oral)   Resp 18   LMP  (LMP Unknown)   SpO2 94%     No intake/output data recorded.    General: Alert, awake, no acute distress.  HEENT: NC/AT, eyes anicteric, external occular movements intact, face symmetric.  Cardiac: RRR, S1, S2.  No murmurs appreciated.  Pulmonary: Normal chest rise, normal work of breathing.  Coarse exp wheezing, cough.  Abdomen: soft, non-tender, non-distended.  Bowel Sounds Present.  No guarding.  Extremities: no deformities.  Warm, well perfused.  Skin: no rashes or lesions noted.  Warm and Dry.  Neuro: No focal deficits noted.  Speech clear.  Coordination and strength grossly " normal.  Psych: Appropriate affect.         Medications:      All current medications were reviewed with changes reflected in problem list.         Data:      All new lab and imaging data was reviewed.   Labs:  Recent Labs   Lab 11/25/23  0740      POTASSIUM 4.3   CHLORIDE 99   CO2 22   ANIONGAP 15   *   BUN 15.9   CR 0.69   GFRESTIMATED 87   MARILYN 8.9     Recent Labs   Lab 11/25/23  0740   WBC 7.4   HGB 12.8   HCT 39.0   MCV 95         Imaging:   Recent Results (from the past 48 hour(s))   XR Chest 2 Views   Result Value    Radiologist flags Lung nodule    Narrative    XR CHEST 2 VIEWS  11/24/2023 10:44 AM       INDICATION: Shortness of breath  COMPARISON: None       Impression    IMPRESSION: Nodular opacity right upper lobe. Possible right hilar  lymphadenopathy. Recommend chest CT with contrast for further  evaluation.    [Recommend Follow Up: Lung nodule]    This report will be copied to the [facility] to ensure a  provider acknowledges the finding. [facility] is available Monday  through Friday 8am-3:30 pm.    HERIBERTO COLÓN MD         SYSTEM ID:  [id number]   CT Chest Pulmonary Embolism w Contrast    Narrative    CT CHEST PULMONARY EMBOLISM WITH CONTRAST 11/24/2023 1:19 PM    CLINICAL HISTORY: SOB (shortness of breath); Acute cough.    TECHNIQUE: CT angiogram chest during arterial phase injection IV  contrast. 2D and 3D MIP reconstructions were performed by the CT  technologist. Dose reduction techniques were used.     CONTRAST: 67mL Isovue-370    COMPARISON: Chest x-ray earlier today.    FINDINGS:  ANGIOGRAM CHEST: Pulmonary arteries are normal caliber and negative  for pulmonary emboli. Thoracic aorta is negative for dissection. No CT  evidence of right heart strain.    LUNGS AND PLEURA: There is an irregular 1.4 cm nodule in the superior  segment of the right lower lobe with multiple surrounding satellite  micronodules. There is mucous plugging central to the  nodule.  Groundglass peribronchial infiltrates in both upper lobes, and to a  lesser extent in the lower lobes.    MEDIASTINUM/AXILLAE: Mild mediastinal and right hilar lymphadenopathy,  noncalcified. Moderate coronary artery calcification.    UPPER ABDOMEN: Normal.    MUSCULOSKELETAL: Normal.      Impression    IMPRESSION:  1.  No evidence of pulmonary embolus.  2.  Bilateral upper and lower lobe pneumonia.  3.  1.5 cm nodule right upper lobe has some features suggestive of  acute granulomatous disease. Three-month follow-up chest CT is  recommended.  4.  Mild right hilar and mediastinal lymphadenopathy can also be  reassessed on follow-up.    HERIBERTO COLÓN MD         SYSTEM ID:  [id number]         Harry Vincent MD     I've spent 50 minutes in chart review, ordering medications and tests, obtaining additional history as needed, evaluating the patient and in documentation for this encounter.

## 2023-11-25 NOTE — PROGRESS NOTES
Pt AOX4. Denies pain. VSS. Pt on regular diet. Patient transfers with assist of 1 with a cane. Pt admitted for community acquired pneumonia and asthma exacerbation. Pt on Rocephin and zithromax. Pt on RA saturating at 92-93% overnight.

## 2023-11-25 NOTE — H&P
[facility]    History and Physical - Hospitalist Service       Date of Admission:  11/24/2023    Assessment & Plan     Cami Turner is a 78 year old female w/PMH asthma, HTN, obesity, psoriasis, depression, asymptomatic stenosis R posterior cerebral artery who presents with persistent cough and found to have CAP, asthma exacerbation    CAP  Asthma exacerbation  -presents with 2 weeks of URI type symptoms without improvement with supportive care. Went to PCP today and had CTA negative for PE but bilateral upper and lower pneumonias noted. Suspect this is CAP superimposed on viral URI. Not hypoxic but wheezy with ronchi and poor air entry, has hx of asthma with increased use of inhaler recently indicating exacerbation  -mild leukocytosis in ED but procal normal, afebrile, not hypoxic. Received Azithro, Rocephin, nebs, steroids  -influenza negative, check covid  -cont Rocephin/azithro, sched and PRN nebs and prednisone 40   -PT to eval    Detectable troponin  -likely 2/2 above, no CP or evidence of ACS. Initial 22 then 18  -no further workup indicated    Hx HTN, obesity, psoriasis, depression, focal stenosis R cerebral artery (asymptomatic)  -resume home ASA, lexapro, lisinopril, norvasc     Diet:  regular  DVT Prophylaxis: Pneumatic Compression Devices  Casiano Catheter: Not present  Lines: None     Cardiac Monitoring: None  Code Status:  full code    Kole Miller DO  Hospitalist Service  [facility]  Securely message with [employer] (more info)  Text page via [employer] Paging/Directory     ______________________________________________________________________    Chief Complaint   cough    History of Present Illness   Cami Turner is a 78 year old female w/PMH asthma, HTN, obesity, psoriasis, depression, asymptomatic stenosis R posterior cerebral artery who presents with persistent cough. She reports having cough, congestion, poor PO intake for the past 2 weeks or so. Has been  taking tylenol and antitussive medications but has not improved. Denies any fever, chills, NV, diarrhea, abd pain, CP. Went to see her PCP today who did CTA chest that was negative for PE but did note bilateral infiltrates and was sent to ED.    In ED did not require oxygen but appeared uncomfortable, frequently coughing and weak. WBC was 11.2 but was afebrile, procal was normal. Influenza testing negative. Does report hx of asthma and seasonal allergies, uses inhaler and has been using multiple times a day recently for sob. Received azithro, rocephin, steroids and nebs in ED      Past Medical History    Past Medical History:   Diagnosis Date    Morbid obesity (H) 07/06/2020       Past Surgical History   Past Surgical History:   Procedure Laterality Date    ANKLE SURGERY Left     HIP SURGERY Right        Prior to Admission Medications   Prior to Admission Medications   Prescriptions Last Dose Informant Patient Reported? Taking?   albuterol (PROAIR HFA/PROVENTIL HFA/VENTOLIN HFA) 108 (90 Base) MCG/ACT inhaler   No No   Sig: Inhale 2 puffs into the lungs every 6 hours as needed   amLODIPine (NORVASC) 2.5 MG tablet   No No   Sig: Take 1 tablet (2.5 mg) by mouth daily   aspirin (ASA) 81 MG chewable tablet   Yes No   Sig: Take 81 mg by mouth daily Take 1 tab daily   escitalopram (LEXAPRO) 20 MG tablet   No No   Sig: Take 1.5 tablets (30 mg) by mouth daily   lisinopril (ZESTRIL) 40 MG tablet   No No   Sig: Take 1 tablet (40 mg) by mouth daily   vitamin D3 (CHOLECALCIFEROL) 50 mcg (2000 units) tablet   Yes No   Sig: Take 1 tablet by mouth daily 1 tab by mouth daily   zolpidem (AMBIEN) 5 MG tablet   No No   Sig: TAKE 1 TABLET EVERY NIGHT AS NEEDED FOR SLEEP      Facility-Administered Medications Last Administration Doses Remaining   sodium chloride (PF) 0.9% PF flush 3 mL None recorded            Review of Systems    The 10 point Review of Systems is negative other than noted in the HPI or here.    Social History   I have  reviewed this patient's social history and updated it with pertinent information if needed.  Social History     Tobacco Use    Smoking status: Never    Smokeless tobacco: Never   Vaping Use    Vaping Use: Never used   Substance Use Topics    Alcohol use: Yes     Comment: 3 drinks per week    Drug use: Never         Family History   No signficant family history reported      Allergies   No Known Allergies     Physical Exam   Vital Signs: Temp: 97.6  F (36.4  C) Temp src: Temporal BP: (!) 174/80 Pulse: 80   Resp: 20 SpO2: 93 % O2 Device: None (Room air)    Weight: 0 lbs 0 oz    Constitutional: fatigued, alert, and cooperative  Eyes: pupils equal, round and reactive to light and conjunctiva normal  ENT: normocepalic, without obvious abnormality, atramatic  Respiratory: no increased work of breathing, diminished air entry diffusely with end expiratory wheezing, bilateral ronchi but no crackles, not on oxygen  Cardiovascular: regular rate and rhythm, no murmur noted, and no edema  GI: normal bowel sounds, soft, and non-distended  Skin: no bruising or bleeding  Neurologic: alert, interacting appropriately, no focal deficits and moving all extremeties    60 MINUTES SPENT BY ME on the date of service doing chart review, history, exam, documentation & further activities per the note.      Data   ------------------------- PAST 24 HR DATA REVIEWED -----------------------------------------------    I have personally reviewed the following data over the past 24 hrs:    11.2 (H)  \   12.7   / 282     134 (L) 97 (L) 19.9 /  95   4.0 21 (L) 0.89 \     ALT: 15 AST: 23 AP: 83 TBILI: 0.3   ALB: 4.0 TOT PROTEIN: 7.5 LIPASE: N/A     Trop: 18 (H) BNP: 429     Procal: 0.30 CRP: N/A Lactic Acid: N/A       INR:  1.10 PTT:  33   D-dimer:  1.50 (H) Fibrinogen:  N/A       Imaging results reviewed over the past 24 hrs:   Recent Results (from the past 24 hour(s))   XR Chest 2 Views   Result Value    Radiologist flags Lung nodule    Narrative     XR CHEST 2 VIEWS  11/24/2023 10:44 AM       INDICATION: Shortness of breath  COMPARISON: None       Impression    IMPRESSION: Nodular opacity right upper lobe. Possible right hilar  lymphadenopathy. Recommend chest CT with contrast for further  evaluation.    [Recommend Follow Up: Lung nodule]    This report will be copied to the [facility] to ensure a  provider acknowledges the finding. Access Center is available Monday  through Friday 8am-3:30 pm.    HERIBERTO COLÓN MD         SYSTEM ID:  [id number]   CT Chest Pulmonary Embolism w Contrast    Narrative    CT CHEST PULMONARY EMBOLISM WITH CONTRAST 11/24/2023 1:19 PM    CLINICAL HISTORY: SOB (shortness of breath); Acute cough.    TECHNIQUE: CT angiogram chest during arterial phase injection IV  contrast. 2D and 3D MIP reconstructions were performed by the CT  technologist. Dose reduction techniques were used.     CONTRAST: 67mL Isovue-370    COMPARISON: Chest x-ray earlier today.    FINDINGS:  ANGIOGRAM CHEST: Pulmonary arteries are normal caliber and negative  for pulmonary emboli. Thoracic aorta is negative for dissection. No CT  evidence of right heart strain.    LUNGS AND PLEURA: There is an irregular 1.4 cm nodule in the superior  segment of the right lower lobe with multiple surrounding satellite  micronodules. There is mucous plugging central to the nodule.  Groundglass peribronchial infiltrates in both upper lobes, and to a  lesser extent in the lower lobes.    MEDIASTINUM/AXILLAE: Mild mediastinal and right hilar lymphadenopathy,  noncalcified. Moderate coronary artery calcification.    UPPER ABDOMEN: Normal.    MUSCULOSKELETAL: Normal.      Impression    IMPRESSION:  1.  No evidence of pulmonary embolus.  2.  Bilateral upper and lower lobe pneumonia.  3.  1.5 cm nodule right upper lobe has some features suggestive of  acute granulomatous disease. Three-month follow-up chest CT is  recommended.  4.  Mild right hilar and mediastinal  lymphadenopathy can also be  reassessed on follow-up.    HERIBETRO COLÓN MD         SYSTEM ID:  [id number]

## 2023-11-25 NOTE — PLAN OF CARE
PRIMARY DIAGNOSIS: PNEUMONIA  OUTPATIENT/OBSERVATION GOALS TO BE MET BEFORE DISCHARGE:  Dyspnea improved and O2 sats >88% on RA or back to baseline O2 levels: Yes   SpO2: 94 %, O2 Device: None (Room air)    Tolerating oral abx or appropriate plans made outpatient infusion: No, still on IV antibiotics     Vitals signs normal or return to baseline: Yes    Short term supplemental O2 needed with activity at home: No    Tolerate oral intake to maintain hydration: Yes    Return to near baseline physical activity: Yes    Discharge Planner Nurse   Safe discharge environment identified: Yes  Barriers to discharge: Yes, IV antibiotics        Entered by: Kia Pagan RN 11/25/2023 4:45 PM     Please review provider order for any additional goals.   Nurse to notify provider when observation goals have been met and patient is ready for discharge.Goal Outcome Evaluation:

## 2023-11-25 NOTE — ED NOTES
[facility]  ED Nurse Handoff Report    ED Chief complaint: Cough and Shortness of Breath  . ED Diagnosis:   Final diagnoses:   Multifocal pneumonia   Pulmonary nodule       Allergies: No Known Allergies    Code Status: DNR / DNI    Activity level - Baseline/Home:  independent.  Activity Level - Current:   standby.   Lift room needed: No.   Bariatric: No   Needed: No   Isolation: Yes.   Infection: Covid rule out     Respiratory status: Room air    Vital Signs (within 30 minutes):   Vitals:    11/24/23 1411   BP: (!) 167/104   Pulse: 80   Resp: 20   Temp: 97.6  F (36.4  C)   TempSrc: Temporal   SpO2: 96%       Cardiac Rhythm:  ,      Pain level:    Patient confused: No.   Patient Falls Risk: patient and family education.   Elimination Status: Has voided     Patient Report - Initial Complaint:   ED Triage Notes Filed PL clinic for cough and SOB. Had CT done in [address] - positive maryam pneumonia and positive trop. Pt denies CP.   .   Focused Assessment:   Respiratory  RespiratoryAirway WDL: WDLAdditional Documentation: Breath Sounds (Group)  Respiratory WDLRespiratory WDL: .WDL except; rhythm/patternRhythm/Pattern, Respiratory: shortness of breath  Breath SoundsBreath Sounds: All FieldsAll Lung Fields Breath Sounds: wheezes, expiratory; wheezes, inspiratory        Abnormal Results:   Labs Ordered and Resulted from Time of ED Arrival to Time of ED Departure   TROPONIN T, HIGH SENSITIVITY - Abnormal       Result Value    Troponin T, High Sensitivity 18 (*)         No orders to display       Treatments provided: neb, abx  Family Comments: family at bedside   OBS brochure/video discussed/provided to patient:  N/A  ED Medications:   Medications   cefTRIAXone (ROCEPHIN) 1 g vial to attach to  mL bag for ADULTS or NS 50 mL bag for PEDS (1 g Intravenous $New Bag 11/24/23 1528)   azithromycin 500 mg (ZITHROMAX) in 0.9% NaCl 250 mL intermittent infusion 500 mg (0 mg Intravenous Hold  11/24/23 1812)   ipratropium - albuterol 0.5 mg/2.5 mg/3 mL (DUONEB) neb solution 3 mL (3 mLs Nebulization $Given 11/24/23 1812)       Drips infusing:  No  For the majority of the shift this patient was Green.   Interventions performed were N/A.    Sepsis treatment initiated: No    Cares/treatment/interventions/medications to be completed following ED care: abx    ED Nurse Name: Ree Ramirez RN  6:42 PM  RECEIVING UNIT ED HANDOFF REVIEW    Above ED Nurse Handoff Report was reviewed: Yes  Reviewed by: Clovis Bacon RN on November 24, 2023 at 8:04 PM

## 2023-11-25 NOTE — DISCHARGE SUMMARY
[facility]  Discharge Summary  Name: Cami Turner    MRN: [id number]  YOB: 1945    Age: 78 year old  Date of Discharge:  2023  Date of Admission: 2023  Primary Care Provider: Tisha Trevino  Discharge Physician:  Jose G Vincent MD  Discharging Service:  Hospitalist      Hospital Course/Discharge Diagnoses:  Cami Turner is a 78 year old female w/PMH asthma, HTN, obesity, psoriasis, depression, asymptomatic stenosis R posterior cerebral artery who presented 2023 with persistent cough and wheezing with shortness of breath.  She did have albuterol inhaler at home but wonders if that was .  She is not on a controller inhaler.  Here in the emergency room she was not objectively hypoxic but appeared uncomfortable and short of breath.  White blood count was elevated 11.2 but she was otherwise afebrile.  Influenza, RSV and COVID testing was negative.  She underwent CT PE protocol which was actually prior to arriving in the ER and done through her primary clinic.  This was negative for PE but did show bilateral upper and lower lobe infiltrates suspected to be community-acquired pneumonia eventually superimposed on a preceding viral URI.     Started on azithromycin, ceftriaxone and IV Solu-Medrol in the ER.    I assumed care on .  Cami has me she feels dramatically improved.  She has been ambulatory around the room and in the bathroom and denies getting short of breath with this.  At this point she is being discharged home with oral prednisone, Ceftin and azithromycin.  I am also refilling an albuterol inhaler and prescribing an inhaled corticosteroid for her as well at least till she can follow-up with her primary care doctor and revisit her asthma plan.    I did update her family at the bedside prior to discharge     CAP  Asthma exacerbation  -2 weeks of URI type symptoms without improvement with supportive care. Went to PCP today and had CTA negative  for PE but bilateral upper and lower pneumonias noted. Suspect this is CAP superimposed on viral URI. Not hypoxic but wheezy with ronchi and poor air entry, has hx of asthma with increased use of inhaler recently indicating exacerbation  -Oral prednisone, Ceftin and azithromycin, albuterol and initiation of a long-acting corticosteroid, likely fluticasone.  -Short-term follow-up with PCP, outpatient PFTs recommended  -Prescribed a neb machine with DuoNebs as well     Detectable troponin  -likely 2/2 above, no CP or evidence of ACS. Initial 22 then 18  -no further workup indicated     Hx HTN, obesity, psoriasis, depression, focal stenosis R cerebral artery (asymptomatic)  -resume home ASA, lexapro, lisinopril, norvasc    Diet:  regular      Discharge Disposition:  Discharged to home     Allergies:  No Known Allergies     Discharge Medications:        Review of your medicines        START taking        Dose / Directions   azithromycin 250 MG tablet  Commonly known as: ZITHROMAX      Dose: 250 mg  Take 1 tablet (250 mg) by mouth daily for 4 days  Quantity: 4 tablet  Refills: 0     cefuroxime 500 MG tablet  Commonly known as: CEFTIN      Dose: 500 mg  Take 1 tablet (500 mg) by mouth 2 times daily for 5 days  Quantity: 10 tablet  Refills: 0     fluticasone 110 MCG/ACT inhaler  Commonly known as: FLOVENT HFA      Dose: 1 puff  Inhale 1 puff into the lungs 2 times daily Rinse mouth after use.  Quantity: 110 g  Refills: 0     predniSONE 20 MG tablet  Commonly known as: DELTASONE      Dose: 40 mg  Take 2 tablets (40 mg) by mouth daily for 4 days  Quantity: 8 tablet  Refills: 0            CONTINUE these medicines which may have CHANGED, or have new prescriptions. If we are uncertain of the size of tablets/capsules you have at home, strength may be listed as something that might have changed.        Dose / Directions   * albuterol 108 (90 Base) MCG/ACT inhaler  Commonly known as: PROAIR HFA/PROVENTIL HFA/VENTOLIN  HFA  Indication: Spasm of Lung Air Passages  This may have changed: Another medication with the same name was added. Make sure you understand how and when to take each.  Used for: Seasonal asthma      Dose: 2 puff  Inhale 2 puffs into the lungs every 6 hours as needed  Refills: 0     * albuterol 108 (90 Base) MCG/ACT inhaler  Commonly known as: PROAIR HFA/PROVENTIL HFA/VENTOLIN HFA  This may have changed: You were already taking a medication with the same name, and this prescription was added. Make sure you understand how and when to take each.      4x daily for the next 3 days, then every 4 hours as needed after that for shortness of breath/wheezing.  Quantity: 18 g  Refills: 0           * This list has 2 medication(s) that are the same as other medications prescribed for you. Read the directions carefully, and ask your doctor or other care provider to review them with you.                CONTINUE these medicines which have NOT CHANGED        Dose / Directions   amLODIPine 2.5 MG tablet  Commonly known as: NORVASC  Used for: Essential hypertension      Dose: 2.5 mg  Take 1 tablet (2.5 mg) by mouth daily  Quantity: 180 tablet  Refills: 1     aspirin 81 MG chewable tablet  Commonly known as: ASA      Dose: 81 mg  Take 81 mg by mouth daily Take 1 tab daily  Refills: 0     escitalopram 20 MG tablet  Commonly known as: LEXAPRO  Used for: Anxiety      Dose: 30 mg  Take 1.5 tablets (30 mg) by mouth daily  Quantity: 135 tablet  Refills: 1     lisinopril 40 MG tablet  Commonly known as: ZESTRIL  Indication: High Blood Pressure Disorder  Used for: Essential hypertension, Benign essential hypertension      Dose: 40 mg  Take 1 tablet (40 mg) by mouth daily  Quantity: 90 tablet  Refills: 3     vitamin D3 50 mcg (2000 units) tablet  Commonly known as: CHOLECALCIFEROL      Dose: 1 tablet  Take 1 tablet by mouth daily 1 tab by mouth daily  Refills: 0     zolpidem 5 MG tablet  Commonly known as: AMBIEN  Used for: Insomnia,  "unspecified type      Dose: 5 mg  TAKE 1 TABLET EVERY NIGHT AS NEEDED FOR SLEEP  Quantity: 10 tablet  Refills: 0               Where to get your medicines        These medications were sent to [facility] [place], [place] - [address] [address] [facility]  [address] [facility] [address]      Phone: [phone]   albuterol 108 (90 Base) MCG/ACT inhaler  azithromycin 250 MG tablet  cefuroxime 500 MG tablet  fluticasone 110 MCG/ACT inhaler  predniSONE 20 MG tablet         Condition on Discharge:  Discharge condition: Stable       Code status on discharge: Full Code     History of Illness:  See detailed admission note for full details.    Physical Exam:  Vital signs:  Temp: 97.1  F (36.2  C) Temp src: Oral BP: (!) 141/61 Pulse: 71   Resp: 18 SpO2: 94 % O2 Device: None (Room air)        Estimated body mass index is 34.2 kg/m  as calculated from the following:    Height as of an earlier encounter on 11/24/23: 1.549 m (5' 1\").    Weight as of an earlier encounter on 11/24/23: 82.1 kg (181 lb).    Wt Readings from Last 1 Encounters:   11/24/23 82.1 kg (181 lb)     General: Alert, awake, no acute distress.  HEENT: NC/AT, eyes anicteric, external occular movements intact, face symmetric.    Cardiac: RRR, S1, S2.  No murmurs appreciated.  Pulmonary: Cough with coarse expiratory wheezes.  Otherwise normal chest rise, normal work of breathing.    Abdomen: soft, non-tender, non-distended.  Bowel Sounds Present.  No guarding.  Extremities: no deformities.  Warm, well perfused.  Skin: no rashes or lesions noted.  Warm and Dry.  Neuro: No focal deficits noted.  Speech clear.  Coordination and strength grossly normal.  Psych: Appropriate affect.    Procedures other than Imaging:  None     Imaging:  Results for orders placed or performed during the hospital encounter of 11/24/23   CT Chest Pulmonary Embolism w Contrast    Narrative    CT CHEST PULMONARY EMBOLISM WITH CONTRAST 11/24/2023 1:19 PM    CLINICAL " HISTORY: SOB (shortness of breath); Acute cough.    TECHNIQUE: CT angiogram chest during arterial phase injection IV  contrast. 2D and 3D MIP reconstructions were performed by the CT  technologist. Dose reduction techniques were used.     CONTRAST: 67mL Isovue-370    COMPARISON: Chest x-ray earlier today.    FINDINGS:  ANGIOGRAM CHEST: Pulmonary arteries are normal caliber and negative  for pulmonary emboli. Thoracic aorta is negative for dissection. No CT  evidence of right heart strain.    LUNGS AND PLEURA: There is an irregular 1.4 cm nodule in the superior  segment of the right lower lobe with multiple surrounding satellite  micronodules. There is mucous plugging central to the nodule.  Groundglass peribronchial infiltrates in both upper lobes, and to a  lesser extent in the lower lobes.    MEDIASTINUM/AXILLAE: Mild mediastinal and right hilar lymphadenopathy,  noncalcified. Moderate coronary artery calcification.    UPPER ABDOMEN: Normal.    MUSCULOSKELETAL: Normal.      Impression    IMPRESSION:  1.  No evidence of pulmonary embolus.  2.  Bilateral upper and lower lobe pneumonia.  3.  1.5 cm nodule right upper lobe has some features suggestive of  acute granulomatous disease. Three-month follow-up chest CT is  recommended.  4.  Mild right hilar and mediastinal lymphadenopathy can also be  reassessed on follow-up.    HERIBERTO COLÓN MD         SYSTEM ID:  [id number]        Consultations:  None       Recent Lab Results:  Recent Labs   Lab 11/25/23  0740 11/24/23  1215   WBC 7.4 11.2*   HGB 12.8 12.7   HCT 39.0 38.3   MCV 95 94    282          Lab Results   Component Value Date     11/25/2023     11/24/2023     07/14/2023     06/05/2021     07/06/2020    Lab Results   Component Value Date    CHLORIDE 99 11/25/2023    CHLORIDE 97 11/24/2023    CHLORIDE 101 07/14/2023    CHLORIDE 103 11/01/2022    CHLORIDE 108 07/12/2022    CHLORIDE 104 06/05/2021    CHLORIDE 107 07/06/2020     Lab Results   Component Value Date    BUN 15.9 11/25/2023    BUN 19.9 11/24/2023    BUN 12.2 07/14/2023    BUN 18 11/01/2022    BUN 12 07/12/2022    BUN 17 06/05/2021    BUN 12 07/06/2020      Lab Results   Component Value Date    POTASSIUM 4.3 11/25/2023    POTASSIUM 4.0 11/24/2023    POTASSIUM 4.4 07/14/2023    POTASSIUM 4.4 11/01/2022    POTASSIUM 5.1 07/12/2022    POTASSIUM 4.0 06/05/2021    POTASSIUM 4.2 07/06/2020    POTASSIUM 4.3 07/02/2018    Lab Results   Component Value Date    CO2 22 11/25/2023    CO2 21 11/24/2023    CO2 25 07/14/2023    CO2 25 11/01/2022    CO2 28 07/12/2022    CO2 24 06/05/2021    CO2 24 07/06/2020    Lab Results   Component Value Date    CR 0.69 11/25/2023    CR 0.89 11/24/2023    CR 0.78 07/14/2023    CR 0.74 06/05/2021    CR 0.76 07/06/2020    CR 0.68 07/02/2018             Pending Results:    Unresulted Labs Ordered in the Past 30 Days of this Admission       No orders found for last 31 day(s).             Discharge Instructions and Follow-Up:   Discharge Procedure Orders   Reason for your hospital stay   Order Comments: You were admitted for multifocal pneumonia with bronchospasm suggestive of a possible asthma exacerbation.  You were treated with antibiotics, steroids and inhalers.    We are refilling your albuterol inhaler and starting a controller inhaler called fluticasone (though pharmacy may substitute this for a similar inhaled steroid if more cost effective).  Please follow-up with your primary care doctor to discuss ongoing management of reactive airway disease and whether you should continue the fluticasone.     Follow-up and recommended labs and tests    Order Comments: Follow up with primary care provider, Tisha Trevino, within 7 days for hospital follow- up.  The following labs/tests are recommended: Please review recovery from this respiratory episode and discuss inhaler refills if needed.     Activity   Order Comments: Your activity upon discharge: activity as  tolerated     Order Specific Question Answer Comments   Is discharge order? Yes      Diet   Order Comments: Follow this diet upon discharge: Orders Placed This Encounter      Combination Diet Regular Diet Adult     Order Specific Question Answer Comments   Is discharge order? Yes        Total time spent in face to face contact with the patient and coordinating discharge was:  50 Minutes.

## 2023-11-25 NOTE — PLAN OF CARE
Goal Outcome Evaluation: Pt alert and oriented. Up with cane and sba to 1 assist. Up in chair for meals. Harsh productive sounding cough. Lungs coarse. Pt feeling better but very tired and feels it would do her good to stay another noc for IV abx. Family present and attentive. Will monitor.

## 2023-11-25 NOTE — PHARMACY-ADMISSION MEDICATION HISTORY
Pharmacist Admission Medication History    Admission medication history is complete. The information provided in this note is only as accurate as the sources available at the time of the update.    Information Source(s): Patient via in-person    Pertinent Information: none    Changes made to PTA medication list:  Added: None  Deleted: None  Changed: None    Medication Affordability:  Not including over the counter (OTC) medications, was there a time in the past 3 months when you did not take your medications as prescribed because of cost?: No    Allergies reviewed with patient and updates made in EHR: yes    Medication History Completed By: Viktor Lundberg RPH 11/24/2023 9:15 PM    Current Facility-Administered Medications for the 11/24/23 encounter (Hospital Encounter)   Medication    sodium chloride (PF) 0.9% PF flush 3 mL     PTA Med List   Medication Sig Last Dose    albuterol (PROAIR HFA/PROVENTIL HFA/VENTOLIN HFA) 108 (90 Base) MCG/ACT inhaler Inhale 2 puffs into the lungs every 6 hours as needed     amLODIPine (NORVASC) 2.5 MG tablet Take 1 tablet (2.5 mg) by mouth daily 11/23/2023 at pm    aspirin (ASA) 81 MG chewable tablet Take 81 mg by mouth daily Take 1 tab daily 11/24/2023 at am    escitalopram (LEXAPRO) 20 MG tablet Take 1.5 tablets (30 mg) by mouth daily 11/24/2023 at am    lisinopril (ZESTRIL) 40 MG tablet Take 1 tablet (40 mg) by mouth daily 11/23/2023 at pm    vitamin D3 (CHOLECALCIFEROL) 50 mcg (2000 units) tablet Take 1 tablet by mouth daily 1 tab by mouth daily 11/24/2023 at am    zolpidem (AMBIEN) 5 MG tablet TAKE 1 TABLET EVERY NIGHT AS NEEDED FOR SLEEP

## 2023-11-26 VITALS
SYSTOLIC BLOOD PRESSURE: 156 MMHG | TEMPERATURE: 97.5 F | RESPIRATION RATE: 18 BRPM | HEART RATE: 84 BPM | OXYGEN SATURATION: 94 % | DIASTOLIC BLOOD PRESSURE: 67 MMHG

## 2023-11-26 PROCEDURE — G0378 HOSPITAL OBSERVATION PER HR: HCPCS

## 2023-11-26 PROCEDURE — 250N000012 HC RX MED GY IP 250 OP 636 PS 637: Performed by: HOSPITALIST

## 2023-11-26 PROCEDURE — 250N000013 HC RX MED GY IP 250 OP 250 PS 637: Performed by: HOSPITALIST

## 2023-11-26 PROCEDURE — 250N000009 HC RX 250: Performed by: HOSPITALIST

## 2023-11-26 PROCEDURE — 999N000157 HC STATISTIC RCP TIME EA 10 MIN

## 2023-11-26 PROCEDURE — 94640 AIRWAY INHALATION TREATMENT: CPT

## 2023-11-26 PROCEDURE — 99239 HOSP IP/OBS DSCHRG MGMT >30: CPT | Performed by: INTERNAL MEDICINE

## 2023-11-26 RX ORDER — IPRATROPIUM BROMIDE AND ALBUTEROL SULFATE 2.5; .5 MG/3ML; MG/3ML
1 SOLUTION RESPIRATORY (INHALATION) EVERY 6 HOURS PRN
Qty: 90 ML | Refills: 0 | Status: SHIPPED | OUTPATIENT
Start: 2023-11-26 | End: 2024-05-24

## 2023-11-26 RX ADMIN — ASPIRIN 81 MG 81 MG: 81 TABLET ORAL at 08:47

## 2023-11-26 RX ADMIN — ALBUTEROL SULFATE 2.5 MG: 2.5 SOLUTION RESPIRATORY (INHALATION) at 08:11

## 2023-11-26 RX ADMIN — ALBUTEROL SULFATE 2.5 MG: 2.5 SOLUTION RESPIRATORY (INHALATION) at 11:58

## 2023-11-26 RX ADMIN — ESCITALOPRAM OXALATE 30 MG: 20 TABLET ORAL at 08:47

## 2023-11-26 RX ADMIN — PREDNISONE 40 MG: 20 TABLET ORAL at 08:47

## 2023-11-26 ASSESSMENT — ACTIVITIES OF DAILY LIVING (ADL)
ADLS_ACUITY_SCORE: 28
ADLS_ACUITY_SCORE: 26
ADLS_ACUITY_SCORE: 24
ADLS_ACUITY_SCORE: 26
ADLS_ACUITY_SCORE: 26

## 2023-11-26 NOTE — PLAN OF CARE
PRIMARY DIAGNOSIS: PNEUMONIA  OUTPATIENT/OBSERVATION GOALS TO BE MET BEFORE DISCHARGE:  Dyspnea improved and O2 sats >88% on RA or back to baseline O2 levels: Yes   SpO2: 94 %, O2 Device: None (Room air)    Tolerating oral abx or appropriate plans made outpatient infusion: No, still on IV antibiotics     Vitals signs normal or return to baseline: Yes    Short term supplemental O2 needed with activity at home: No    Tolerate oral intake to maintain hydration: Yes    Return to near baseline physical activity: Yes, up SBA with cane     Discharge Planner Nurse   Safe discharge environment identified: Yes  Barriers to discharge: Yes, IV antibiotics        Entered by: Kia Pagan RN 11/25/2023 10:30 PM     Please review provider order for any additional goals.   Nurse to notify provider when observation goals have been met and patient is ready for discharge.Goal Outcome Evaluation:

## 2023-11-26 NOTE — PLAN OF CARE
PRIMARY DIAGNOSIS: PNEUMONIA  OUTPATIENT/OBSERVATION GOALS TO BE MET BEFORE DISCHARGE:  Dyspnea improved and O2 sats >88% on RA or back to baseline O2 levels: Yes   SpO2: 95 %, O2 Device: None (Room air)    Tolerating oral abx or appropriate plans made outpatient infusion: No, still on IV antibiotics     Vitals signs normal or return to baseline: Yes    Short term supplemental O2 needed with activity at home: No, on room air     Tolerate oral intake to maintain hydration: Yes    Return to near baseline physical activity: Yes, up SBA with cane     Discharge Planner Nurse   Safe discharge environment identified: Yes  Barriers to discharge: Yes, IV antibiotics        Entered by: Kia Pagan RN 11/26/2023 4:31 AM     Please review provider order for any additional goals.   Nurse to notify provider when observation goals have been met and patient is ready for discharge.       Pt slept well overnight after PRN melatonin given. Up SBA with cane. Plan to discharge today, will continue with plan of care.

## 2023-11-26 NOTE — PLAN OF CARE
PRIMARY DIAGNOSIS: PNEUMONIA  OUTPATIENT/OBSERVATION GOALS TO BE MET BEFORE DISCHARGE:  Dyspnea improved and O2 sats >88% on RA or back to baseline O2 levels: Yes   SpO2: 95 %, O2 Device: None (Room air)    Tolerating oral abx or appropriate plans made outpatient infusion: No, still on IV antibiotics     Vitals signs normal or return to baseline: Yes    Short term supplemental O2 needed with activity at home: No, on room air     Tolerate oral intake to maintain hydration: Yes    Return to near baseline physical activity: Yes, up SBA with cane     Discharge Planner Nurse   Safe discharge environment identified: Yes  Barriers to discharge: Yes, IV antibiotics        Entered by: Kia Pagan RN 11/26/2023 12:41 AM     Please review provider order for any additional goals.   Nurse to notify provider when observation goals have been met and patient is ready for discharge.

## 2023-11-26 NOTE — PLAN OF CARE
Goal Outcome Evaluation:      Plan of Care Reviewed With: patient    Overall Patient Progress: improvingOverall Patient Progress: improving    PRIMARY DIAGNOSIS: PNEUMONIA  OUTPATIENT/OBSERVATION GOALS TO BE MET BEFORE DISCHARGE:  Dyspnea improved and O2 sats >88% on RA or back to baseline O2 levels: Yes   SpO2: 94 %, O2 Device: None (Room air)    Tolerating oral abx or appropriate plans made outpatient infusion: No, going home on PO abx    Vitals signs normal or return to baseline: Yes    Short term supplemental O2 needed with activity at home: No    Tolerate oral intake to maintain hydration: Yes    Return to near baseline physical activity: Yes    Discharge Planner Nurse   Safe discharge environment identified: Yes  Barriers to discharge: No       Entered by: Mere Ramirez RN 11/26/2023 12:05 PM     Please review provider order for any additional goals.   Nurse to notify provider when observation goals have been met and patient is ready for discharge.

## 2023-11-26 NOTE — PLAN OF CARE
Goal Outcome Evaluation:      Plan of Care Reviewed With: patient    Overall Patient Progress: improvingOverall Patient Progress: improving    A&Ox4. VSS on RA except HTN. Up with SBA and cane. Denies pain. No complaints of SOB. LS diminished/coarse with expiratory wheezing. Infrequent, nonproductive cough. Regular diet, tolerating. Discharge instructions discussed with patient. Discharge meds discussed and given to patient. Personal belongings with patient. IS provided. Transportation to home to be provided by daughter.

## 2023-11-26 NOTE — PLAN OF CARE
Goal Outcome Evaluation:      Plan of Care Reviewed With: patient    Overall Patient Progress: improvingOverall Patient Progress: improving    PRIMARY DIAGNOSIS: PNEUMONIA  OUTPATIENT/OBSERVATION GOALS TO BE MET BEFORE DISCHARGE:  Dyspnea improved and O2 sats >88% on RA or back to baseline O2 levels: Yes   SpO2: 95 %, O2 Device: None (Room air)    Tolerating oral abx or appropriate plans made outpatient infusion: No    Vitals signs normal or return to baseline: No, SBP 170s    Short term supplemental O2 needed with activity at home: No    Tolerate oral intake to maintain hydration: Yes    Return to near baseline physical activity: Yes    Discharge Planner Nurse   Safe discharge environment identified: Yes  Barriers to discharge: Yes       Entered by: Mere Ramirez RN 11/26/2023 10:55 AM     Please review provider order for any additional goals.   Nurse to notify provider when observation goals have been met and patient is ready for discharge.

## 2023-11-27 LAB
ATRIAL RATE - MUSE: 81 BPM
DIASTOLIC BLOOD PRESSURE - MUSE: NORMAL MMHG
INTERPRETATION ECG - MUSE: NORMAL
P AXIS - MUSE: 66 DEGREES
PR INTERVAL - MUSE: 152 MS
QRS DURATION - MUSE: 86 MS
QT - MUSE: 368 MS
QTC - MUSE: 427 MS
R AXIS - MUSE: 37 DEGREES
SYSTOLIC BLOOD PRESSURE - MUSE: NORMAL MMHG
T AXIS - MUSE: 53 DEGREES
VENTRICULAR RATE- MUSE: 81 BPM

## 2023-11-28 ENCOUNTER — PATIENT OUTREACH (OUTPATIENT)
Dept: CARE COORDINATION | Facility: CLINIC | Age: 78
End: 2023-11-28

## 2023-11-28 NOTE — PROGRESS NOTES
Connected Care Resource Center Contact  [facility]/Voicemail     Clinical Data: Post-Discharge Outreach     Outreach attempted x 2.  Left message on patient's voicemail, providing [facility]'s central phone number of [phone] ([phone]) for questions/concerns and/or to schedule an appt with an [facility] provider, if they do not have a PCP.      Plan:  [facility] will do no further outreaches at this time.       STEFFANIE Felix  [facility], [facility]    *Connected Care Resource Team does NOT follow patient ongoing. Referrals are identified based on internal discharge reports and the outreach is to ensure patient has an understanding of their discharge instructions.

## 2023-12-06 ENCOUNTER — TELEPHONE (OUTPATIENT)
Dept: NEUROLOGY | Facility: CLINIC | Age: 78
End: 2023-12-06

## 2023-12-06 ENCOUNTER — OFFICE VISIT (OUTPATIENT)
Dept: FAMILY MEDICINE | Facility: CLINIC | Age: 78
End: 2023-12-06
Payer: MEDICARE

## 2023-12-06 VITALS
TEMPERATURE: 97.8 F | HEART RATE: 96 BPM | WEIGHT: 181 LBS | BODY MASS INDEX: 34.17 KG/M2 | RESPIRATION RATE: 16 BRPM | DIASTOLIC BLOOD PRESSURE: 74 MMHG | HEIGHT: 61 IN | OXYGEN SATURATION: 97 % | SYSTOLIC BLOOD PRESSURE: 110 MMHG

## 2023-12-06 DIAGNOSIS — J18.9 MULTIFOCAL PNEUMONIA: ICD-10-CM

## 2023-12-06 DIAGNOSIS — J45.30 MILD PERSISTENT ASTHMA WITHOUT COMPLICATION: ICD-10-CM

## 2023-12-06 DIAGNOSIS — Z09 HOSPITAL DISCHARGE FOLLOW-UP: Primary | ICD-10-CM

## 2023-12-06 PROCEDURE — 99495 TRANSJ CARE MGMT MOD F2F 14D: CPT | Performed by: NURSE PRACTITIONER

## 2023-12-06 RX ORDER — FLUTICASONE PROPIONATE 110 UG/1
1 AEROSOL, METERED RESPIRATORY (INHALATION) 2 TIMES DAILY
Qty: 110 G | Refills: 5 | Status: SHIPPED | OUTPATIENT
Start: 2023-12-06 | End: 2024-02-05

## 2023-12-06 RX ORDER — ALBUTEROL SULFATE 90 UG/1
AEROSOL, METERED RESPIRATORY (INHALATION)
Qty: 18 G | Refills: 0 | Status: SHIPPED | OUTPATIENT
Start: 2023-12-06 | End: 2023-12-15

## 2023-12-06 NOTE — PROGRESS NOTES
Assessment & Plan     Cami was seen today for hospital f/u.    Diagnoses and all orders for this visit:    Hospital discharge follow-up  Multifocal pneumonia  Mild persistent asthma without complication  S/P hospitalization from 23 through 23 for community acquired pneumonia, noted improvement in status since discharge from hospital.  Will plan to continue on Flovent HFA and further consultation with pulmonary.    -     Adult Pulmonary Medicine  Referral; Future  -     albuterol (PROAIR HFA/PROVENTIL HFA/VENTOLIN HFA) 108 (90 Base) MCG/ACT inhaler; 4x daily for the next 3 days, then every 4 hours as needed after that for shortness of breath/wheezing.  -     fluticasone (FLOVENT HFA) 110 MCG/ACT inhaler; Inhale 1 puff into the lungs 2 times daily Rinse mouth after use.         MED REC REQUIRED  Post Medication Reconciliation Status:  Discharge medications reconciled, continue medications without change    Return in about 6 weeks (around 2024) for consultation with pulmonary.    LYNDSEY Mena CNP  [facility]   Cami is a 78 year old, presenting for the following health issues:  Hospital F/U        2023    10:40 AM   Additional Questions   Roomed by giovani       [facility] Follow-up Visit:    Hospital/Nursing Home/IP Rehab Facility: [facility]  Date of Admission: 2023  Date of Discharge: 2023  Reason(s) for Admission: CAP    Hospitalization course:    Cami Turner is a 78 year old female w/PMH asthma, HTN, obesity, psoriasis, depression, asymptomatic stenosis R posterior cerebral artery who presented 2023 with persistent cough and wheezing with shortness of breath.  She did have albuterol inhaler at home but wonders if that was .  She is not on a controller inhaler.  Here in the emergency room she was not objectively hypoxic but appeared uncomfortable and short of breath.     "Influenza, RSV and COVID testing was negative.  She underwent CT PE protocol which was actually prior to arriving in the ER and done through her primary clinic.  This was negative for PE but did show bilateral upper and lower lobe infiltrates suspected to be community-acquired pneumonia eventually superimposed on a preceding viral URI.  Started on azithromycin, ceftriaxone and IV Solu-Medrol in the ER.  Discharged home with oral Prednisone, Ceftin and Azithromycin.    Is feeling well since discharge, completed antibiotics.      Historically has a history of asthma, has never had pulmonary consultation.    Baseline wheezing.    Family is requesting further pulmonary consultation.      Was your hospitalization related to COVID-19? No   Problems taking medications regularly:  None  Medication changes since discharge: inhalers  Problems adhering to non-medication therapy:  None    Summary of hospitalization:  [facility] hospital discharge summary reviewed  Diagnostic Tests/Treatments reviewed.  Follow up needed: none  Other Healthcare Providers Involved in Patient s Care:         None  Update since discharge: improved.     Plan of care communicated with patient and family     Is here with daughter at today's appointment.        Review of Systems   Constitutional, HEENT, cardiovascular, pulmonary, gi and gu systems are negative, except as otherwise noted.      Objective    /74   Pulse 96   Temp 97.8  F (36.6  C) (Tympanic)   Resp 16   Ht 1.549 m (5' 1\")   Wt 82.1 kg (181 lb)   LMP  (LMP Unknown)   SpO2 97%   BMI 34.20 kg/m    Body mass index is 34.2 kg/m .    Physical Exam     GENERAL: healthy, alert and no distress  RESP: lungs clear to auscultation - no rales, rhonchi or wheezes  CV: regular rate and rhythm, normal S1 S2, no S3 or S4  PSYCH: mentation appears normal, affect normal/bright            "

## 2023-12-07 ENCOUNTER — OFFICE VISIT (OUTPATIENT)
Dept: NEUROLOGY | Facility: CLINIC | Age: 78
End: 2023-12-07
Payer: MEDICARE

## 2023-12-07 VITALS — DIASTOLIC BLOOD PRESSURE: 79 MMHG | SYSTOLIC BLOOD PRESSURE: 118 MMHG | HEART RATE: 85 BPM | OXYGEN SATURATION: 97 %

## 2023-12-07 DIAGNOSIS — I66.9 ASYMPTOMATIC STENOSIS OF INTRACRANIAL ARTERY: ICD-10-CM

## 2023-12-07 DIAGNOSIS — G31.84 MILD COGNITIVE IMPAIRMENT: Primary | ICD-10-CM

## 2023-12-07 PROCEDURE — 99213 OFFICE O/P EST LOW 20 MIN: CPT | Performed by: PSYCHIATRY & NEUROLOGY

## 2023-12-07 NOTE — LETTER
12/7/2023         RE: Cami Turner  [address]  [place] [address]        Dear Colleague,    Thank you for referring your patient, Cami Turner, to the [facility]. Please see a copy of my visit note below.    ESTABLISHED PATIENT NEUROLOGY NOTE    DATE OF VISIT: 12/7/2023  CLINIC LOCATION: [facility]  MRN: [id number]  PATIENT NAME: Cami Turner  YOB: 1945    REASON FOR VISIT:   Chief Complaint   Patient presents with     Follow Up     Memory- patient and family feel memory is stable      SUBJECTIVE:                                                      HISTORY OF PRESENT ILLNESS: Patient is here to follow up regarding memory loss. Please refer to my initial note from 6/7/2023 for further information.  Accompanied by her daughter.    Since the last visit, the patient reports no changes.  Daughter agrees.  Cami denies interval development of new focal neurological symptoms.    Laboratory evaluation was not done.  LDL was checked in July 2023 (147).    CPT from 7/10/2023 was 4.8/5.5 consistent with mild to moderate cognitive functional disability that would require daily checks for safety.    Neuropsychologic evaluation from 11/20/2023 demonstrated impaired performance on selective test of memory retrieval and executive functioning felt consistent with mild cognitive impairment.  Reevaluation in 12 months was advised.  EXAM:                                                    Physical Exam:   Vitals: /79 (BP Location: Right arm, Patient Position: Sitting, Cuff Size: Adult Regular)   Pulse 85   LMP  (LMP Unknown)   SpO2 97%     General: pt is in NAD, cooperative.  Skin: normal turgor, moist mucous membranes, no lesions/rashes noticed.  HEENT: ATNC, white sclera, normal conjunctiva.  Respiratory: Symmetric lung excursion, no accessory respiratory muscle use.  Abdomen: Non distended.  Neurological: awake, cooperative, follows  commands, no exam changes compared to the initial visit.  ASSESSMENT AND PLAN:                                                    Assessment: 78 year old female patient presents for follow-up of mild cognitive impairment after the completion of recommended workup, except labs.  We reviewed the results that would be consistent with her suspected diagnosis of mild cognitive impairment, and I encouraged the patient to complete her laboratory evaluation.  I would like to see her back in 6 months to repeat MoCA.    Regarding her mild to moderate focal stenosis of right PCA, her LDL came back elevated at 147.  Encouraged the patient to discuss initiation of statin with her primary care provider.    Diagnoses:    ICD-10-CM    1. Mild cognitive impairment  G31.84       2. Asymptomatic stenosis of intracranial artery  I66.9         Plan: At today's visit we thoroughly discussed current symptoms, evaluation results, diagnosis, and the plan.    We will repeat cognitive test at the next visit.    Advised to complete the recommended labs that were previously ordered.    Regarding elevated cholesterol, recommended to discuss starting statin with her primary care provider.    Next follow-up appointment is in the next 6 months or earlier if needed.    Total Time: 21 minutes spent on the date of the encounter doing chart review, history and exam, documentation and further activities per the note.    Gilson Casarez MD  [facility]  (Chart documentation was completed in part with Dragon voice-recognition software. Even though reviewed, some grammatical, spelling, and word errors may remain.)    Cami Turner is a 78 year old female who presents for:  Chief Complaint   Patient presents with     Follow Up     Memory- patient and family feel memory is stable         Initial Vitals:  /79 (BP Location: Right arm, Patient Position: Sitting, Cuff Size: Adult Regular)   Pulse 85   LMP  (LMP Unknown)   SpO2  "97%  Estimated body mass index is 34.2 kg/m  as calculated from the following:    Height as of 12/6/23: 1.549 m (5' 1\").    Weight as of 12/6/23: 82.1 kg (181 lb).. There is no height or weight on file to calculate BSA. BP completed using cuff size: govind Garcia       Again, thank you for allowing me to participate in the care of your patient.        Sincerely,        Gilson Casarez MD  "

## 2023-12-07 NOTE — PROGRESS NOTES
"Caim Turner is a 78 year old female who presents for:  Chief Complaint   Patient presents with    Follow Up     Memory- patient and family feel memory is stable         Initial Vitals:  /79 (BP Location: Right arm, Patient Position: Sitting, Cuff Size: Adult Regular)   Pulse 85   LMP  (LMP Unknown)   SpO2 97%  Estimated body mass index is 34.2 kg/m  as calculated from the following:    Height as of 12/6/23: 1.549 m (5' 1\").    Weight as of 12/6/23: 82.1 kg (181 lb).. There is no height or weight on file to calculate BSA. BP completed using cuff size: regular    Nabila Garcia   "

## 2023-12-07 NOTE — PROGRESS NOTES
ESTABLISHED PATIENT NEUROLOGY NOTE    DATE OF VISIT: 12/7/2023  CLINIC LOCATION: [facility]  MRN: [id number]  PATIENT NAME: Cami Turner  YOB: 1945    REASON FOR VISIT:   Chief Complaint   Patient presents with    Follow Up     Memory- patient and family feel memory is stable      SUBJECTIVE:                                                      HISTORY OF PRESENT ILLNESS: Patient is here to follow up regarding memory loss. Please refer to my initial note from 6/7/2023 for further information.  Accompanied by her daughter.    Since the last visit, the patient reports no changes.  Daughter agrees.  Cami denies interval development of new focal neurological symptoms.  Her  recently transitioned to hospice care due to advanced dementia and significantly impaired mobility.    Laboratory evaluation was not done.  LDL was checked in July 2023 (147).    CPT from 7/10/2023 was 4.8/5.5 consistent with mild to moderate cognitive functional disability that would require daily checks for safety.    Neuropsychologic evaluation from 11/20/2023 demonstrated impaired performance on selective test of memory retrieval and executive functioning felt consistent with mild cognitive impairment.  Reevaluation in 12 months was advised.  EXAM:                                                    Physical Exam:   Vitals: /79 (BP Location: Right arm, Patient Position: Sitting, Cuff Size: Adult Regular)   Pulse 85   LMP  (LMP Unknown)   SpO2 97%     General: pt is in NAD, cooperative.  Skin: normal turgor, moist mucous membranes, no lesions/rashes noticed.  HEENT: ATNC, white sclera, normal conjunctiva.  Respiratory: Symmetric lung excursion, no accessory respiratory muscle use.  Abdomen: Non distended.  Neurological: awake, cooperative, follows commands, no exam changes compared to the initial visit.  ASSESSMENT AND PLAN:                                                    Assessment: 78 year  old female patient presents for follow-up of mild cognitive impairment after the completion of recommended workup, except labs.  We reviewed the results that would be consistent with her suspected diagnosis of mild cognitive impairment, and I encouraged the patient to complete her laboratory evaluation.  I would like to see her back in 6 months to repeat MoCA.    Regarding her mild to moderate focal stenosis of right PCA, her LDL came back elevated at 147.  Encouraged the patient to discuss initiation of statin with her primary care provider.    Diagnoses:    ICD-10-CM    1. Mild cognitive impairment  G31.84       2. Asymptomatic stenosis of intracranial artery  I66.9         Plan: At today's visit we thoroughly discussed current symptoms, evaluation results, diagnosis, and the plan.    We will repeat cognitive test at the next visit.    Advised to complete the recommended labs that were previously ordered.    Regarding elevated cholesterol, recommended to discuss starting statin with her primary care provider.    Next follow-up appointment is in the next 6 months or earlier if needed.    Total Time: 21 minutes spent on the date of the encounter doing chart review, history and exam, documentation and further activities per the note.    Gilson Casarez MD  [facility]  (Chart documentation was completed in part with Dragon voice-recognition software. Even though reviewed, some grammatical, spelling, and word errors may remain.)

## 2023-12-07 NOTE — PATIENT INSTRUCTIONS
AFTER VISIT SUMMARY (AVS):    At today's visit we thoroughly discussed current symptoms, evaluation results, diagnosis, and the plan.    We will repeat cognitive test at the next visit.    Please complete the recommended labs that were previously ordered.    Regarding your elevated cholesterol, please discuss starting statin with your primary care provider.    Next follow-up appointment is in the next 6 months or earlier if needed.    Please do not hesitate to call me with any questions or concerns.    Thanks.

## 2023-12-11 ENCOUNTER — TELEPHONE (OUTPATIENT)
Dept: FAMILY MEDICINE | Facility: CLINIC | Age: 78
End: 2023-12-11
Payer: MEDICARE

## 2023-12-11 NOTE — TELEPHONE ENCOUNTER
Prior Authorization Retail Medication Request    Medication/Dose: Escitalopram Oxalate 20MG Tablets  Diagnosis and ICD code (if different than what is on RX):    New/renewal/insurance change PA/secondary ins. PA:  Previously Tried and Failed:    Rationale:      Insurance   Primary:   Insurance ID:  [id number]    Secondary (if applicable):  Insurance ID:      Pharmacy Information (if different than what is on RX)  Name:  [facility]  Phone:    Fax:[phone]

## 2023-12-11 NOTE — TELEPHONE ENCOUNTER
Prior Authorization Retail Medication Request    Medication/Dose: Escitalopram Oxalate 20MG Tablets  Diagnosis and ICD code (if different than what is on RX):    New/renewal/insurance change PA/secondary ins. PA:  Previously Tried and Failed:    Rationale:      Insurance   Primary: Medicare   Insurance ID:  [id number]     Secondary (if applicable):[facility]  Insurance ID:  [id number]     Pharmacy Information (if different than what is on RX)  Name:  [facility]  Phone:  [phone]  Fax:[phone]

## 2023-12-13 NOTE — TELEPHONE ENCOUNTER
Central Prior Authorization Team  Phone: [phone]    PA Initiation    Medication: ESCITALOPRAM OXALATE 20 MG PO TABS  Insurance Company: [employer] - Phone [phone] Fax [phone]  Pharmacy Filling the Rx: [facility] - [facility] [address]  Filling Pharmacy Phone: [phone]  Filling Pharmacy Fax:    Start Date: 12/13/2023

## 2023-12-13 NOTE — TELEPHONE ENCOUNTER
Prior Authorization Approval    Medication: ESCITALOPRAM OXALATE 20 MG PO TABS  Authorization Effective Date: 12/13/2023  Authorization Expiration Date: 12/31/2024  Approved Dose/Quantity: 135 per 90 days   Reference #:     Insurance Company: [employer] - Phone [phone] Fax [phone]  Expected CoPay: $    CoPay Card Available:      Financial Assistance Needed:   Which Pharmacy is filling the prescription: [facility] - [facility] [address]  Pharmacy Notified: Yes  Patient Notified: **Instructed pharmacy to notify patient when script is ready to /ship.**

## 2023-12-14 NOTE — TELEPHONE ENCOUNTER
EARNESTINE with patient that this was approved and pharmacy was instructed to call you to let you know when they are mailing the RX.    Sindhu HENLEY

## 2023-12-15 ENCOUNTER — TELEPHONE (OUTPATIENT)
Dept: FAMILY MEDICINE | Facility: CLINIC | Age: 78
End: 2023-12-15
Payer: MEDICARE

## 2023-12-15 DIAGNOSIS — J18.9 MULTIFOCAL PNEUMONIA: ICD-10-CM

## 2023-12-15 RX ORDER — ALBUTEROL SULFATE 90 UG/1
2 AEROSOL, METERED RESPIRATORY (INHALATION) EVERY 4 HOURS PRN
Qty: 18 G | Refills: 0 | Status: SHIPPED | OUTPATIENT
Start: 2023-12-15 | End: 2024-02-05

## 2023-12-15 NOTE — TELEPHONE ENCOUNTER
[facility] pharmacy calling.     Needing clarification on albuterol inhaler. Sent on 12/6.     Need to know how many puffs to do at a time.     Call back #- [phone]     Routing to provider to review and advise.     Perla Alcantara RN  [facility]

## 2024-01-22 ENCOUNTER — OFFICE VISIT (OUTPATIENT)
Dept: PULMONOLOGY | Facility: CLINIC | Age: 79
End: 2024-01-22
Payer: MEDICARE

## 2024-01-22 ENCOUNTER — OFFICE VISIT (OUTPATIENT)
Dept: NURSING | Facility: CLINIC | Age: 79
End: 2024-01-22
Payer: MEDICARE

## 2024-01-22 VITALS
WEIGHT: 184 LBS | SYSTOLIC BLOOD PRESSURE: 124 MMHG | DIASTOLIC BLOOD PRESSURE: 74 MMHG | BODY MASS INDEX: 34.74 KG/M2 | HEIGHT: 61 IN | HEART RATE: 94 BPM | OXYGEN SATURATION: 91 %

## 2024-01-22 VITALS — WEIGHT: 184 LBS | OXYGEN SATURATION: 97 % | BODY MASS INDEX: 34.77 KG/M2 | HEART RATE: 73 BPM

## 2024-01-22 DIAGNOSIS — J45.40 MODERATE PERSISTENT ASTHMA WITHOUT COMPLICATION: ICD-10-CM

## 2024-01-22 DIAGNOSIS — J45.30 MILD PERSISTENT ASTHMA WITHOUT COMPLICATION: ICD-10-CM

## 2024-01-22 DIAGNOSIS — J18.9 MULTIFOCAL PNEUMONIA: ICD-10-CM

## 2024-01-22 DIAGNOSIS — R93.89 ABNORMAL CT OF THE CHEST: Primary | ICD-10-CM

## 2024-01-22 PROCEDURE — 94060 EVALUATION OF WHEEZING: CPT | Performed by: INTERNAL MEDICINE

## 2024-01-22 PROCEDURE — 99215 OFFICE O/P EST HI 40 MIN: CPT | Mod: 25 | Performed by: PHYSICIAN ASSISTANT

## 2024-01-22 PROCEDURE — 94729 DIFFUSING CAPACITY: CPT | Performed by: INTERNAL MEDICINE

## 2024-01-22 PROCEDURE — 94726 PLETHYSMOGRAPHY LUNG VOLUMES: CPT | Performed by: INTERNAL MEDICINE

## 2024-01-22 ASSESSMENT — ASTHMA QUESTIONNAIRES
ACT_TOTALSCORE: 23
QUESTION_5 LAST FOUR WEEKS HOW WOULD YOU RATE YOUR ASTHMA CONTROL: WELL CONTROLLED
QUESTION_3 LAST FOUR WEEKS HOW OFTEN DID YOUR ASTHMA SYMPTOMS (WHEEZING, COUGHING, SHORTNESS OF BREATH, CHEST TIGHTNESS OR PAIN) WAKE YOU UP AT NIGHT OR EARLIER THAN USUAL IN THE MORNING: NOT AT ALL
QUESTION_2 LAST FOUR WEEKS HOW OFTEN HAVE YOU HAD SHORTNESS OF BREATH: NOT AT ALL
ACUTE_EXACERBATION_TODAY: NO
QUESTION_4 LAST FOUR WEEKS HOW OFTEN HAVE YOU USED YOUR RESCUE INHALER OR NEBULIZER MEDICATION (SUCH AS ALBUTEROL): ONCE A WEEK OR LESS
QUESTION_1 LAST FOUR WEEKS HOW MUCH OF THE TIME DID YOUR ASTHMA KEEP YOU FROM GETTING AS MUCH DONE AT WORK, SCHOOL OR AT HOME: NONE OF THE TIME
ACT_TOTALSCORE: 23

## 2024-01-22 ASSESSMENT — PAIN SCALES - GENERAL: PAINLEVEL: NO PAIN (0)

## 2024-01-22 NOTE — PROGRESS NOTES
[facility]  New Pulmonary Consult    Name: Cami Turner MRN: [id number]     Age: 78 year old   YOB: 1945       Reason for Visit:   Chief Complaint   Patient presents with    New Patient     New consult for multifocal pneumonia and persistent asthma.             Assessment and Plan:     Cami Turner is a 78 year old female with history of asthma and HTN who presents for post hospital follow up of pneumonia. Here with her caretaker Cecy    # Moderate intermittent asthma  Hospitalized 11/24-11/26/2023 with pneumonia, improved with antibiotics and steroids. She has history of mild asthma dating back to childhood with rare symptoms, uses albuterol inhaler sparingly and has never been hospitalized or had urgent care visits for asthma. ACT 23 today. PFTs demonstrate mild obstruction with significant bronchodilator response (+27%), consistent with asthma. Endorses mild seasonal allergies, takes OTC antihistamine PRN with benefit. Peripheral eosinophils 100 (11/24/2023). I recommend she stop ICS (Flovent) as prescribed from the hospital, and start ICS-LABA daily per asthma gold standard recommendations. She can continue to use albuterol inhaler/nebulizer as needed. She can continue OTC antihistamines PRN.   - Stop Flovent, start ICS/LABA (Rx sent for Dulera). OK to substitute with formulary equivalent per insurance.   - OTC antihistamines PRN    # Abnormal CT chest / Pulmonary nodule  CT PE 11/24/2023 during hospitalization with pneumonia noted 1.5cm RUL nodule, suggestive of acute granulomatous disease. She is considered low risk, I suspect this is benign. Will repeat CT in 3 months, which would bring us to late Feb/early March 2024.        Return to clinic in 2-3 months with repeat CT      45 minutes spent reviewing chart, reviewing test results, talking with and examining patient, formulating plan, and documentation on the day of the encounter.    TEZ Chacko  Pulmonology  Pager #[phone]             HPI:   Cami Turner is a 78 year old female with history of asthma and HTN who presents for post hospital follow up of pneumonia. Here with her caretaker Cecy    Hospitalized at [facility] 11/24-11/26/2023 with shortness of breath, cough and wheeze. CT with bilateral upper and lower lobe infiltrates concerning for pneumonia. Managed with antibiotics and steroids. Discharged with flovent , refills of albuterol inhaler PRN, and duoneb PRN.     She feels better since her hospitalization. Has history of asthma and allergies in childhood. She doesn't have asthma symptoms often and would rarely use albuterol inhaler. Wheezes a little bit in the morning. No dyspnea. Endorses seasonal allergies int he summer, takes zyrtec in the morning and something (?) at night over the counter. No cough. No GERD. No LE edema. No dysphagia. Never hospitalized with breathing issues prior to 11/2023.   Started using Flovent twice daily without issue. She has a nebulizer machine but doesn't use it.       10 point ROS performed and negative except for as noted in HPI.    Tobacco or vaping: never a smoker  Up to date COVID, flu, pneumococcal and RSV vaccines           Past Medical History:     Past Medical History:   Diagnosis Date    Morbid obesity (H) 07/06/2020             Past Surgical History:      Past Surgical History:   Procedure Laterality Date    ANKLE SURGERY Left     HIP SURGERY Right              Social History:     Social History     Socioeconomic History    Marital status:      Spouse name: Not on file    Number of children: Not on file    Years of education: Not on file    Highest education level: Not on file   Occupational History    Not on file   Tobacco Use    Smoking status: Never    Smokeless tobacco: Never   Vaping Use    Vaping Use: Never used   Substance and Sexual Activity    Alcohol use: Yes     Comment: 3 drinks per week    Drug use: Never    Sexual activity: Not Currently      Partners: Male   Other Topics Concern    Not on file   Social History Narrative    Not on file     Social Determinants of Health     Financial Resource Strain: Low Risk  (11/24/2023)    Financial Resource Strain     Within the past 12 months, have you or your family members you live with been unable to get utilities (heat, electricity) when it was really needed?: No   Food Insecurity: Low Risk  (11/24/2023)    Food Insecurity     Within the past 12 months, did you worry that your food would run out before you got money to buy more?: No     Within the past 12 months, did the food you bought just not last and you didn t have money to get more?: No   Transportation Needs: Low Risk  (11/24/2023)    Transportation Needs     Within the past 12 months, has lack of transportation kept you from medical appointments, getting your medicines, non-medical meetings or appointments, work, or from getting things that you need?: No   Physical Activity: Not on file   Stress: Not on file   Social Connections: Not on file   Interpersonal Safety: Not on file   Housing Stability: Low Risk  (11/24/2023)    Housing Stability     Do you have housing? : Yes     Are you worried about losing your housing?: No            Family History:   No family history on file.          Allergies:   No Known Allergies          Medications:   albuterol (PROAIR HFA/PROVENTIL HFA/VENTOLIN HFA) 108 (90 Base) MCG/ACT inhaler, Inhale 2 puffs into the lungs every 4 hours as needed for shortness of breath, wheezing or cough  amLODIPine (NORVASC) 2.5 MG tablet, Take 1 tablet (2.5 mg) by mouth daily  aspirin (ASA) 81 MG chewable tablet, Take 81 mg by mouth daily Take 1 tab daily  escitalopram (LEXAPRO) 20 MG tablet, Take 1.5 tablets (30 mg) by mouth daily  fluticasone (FLOVENT HFA) 110 MCG/ACT inhaler, Inhale 1 puff into the lungs 2 times daily Rinse mouth after use.  lisinopril (ZESTRIL) 40 MG tablet, Take 1 tablet (40 mg) by mouth daily  vitamin D3  "(CHOLECALCIFEROL) 50 mcg (2000 units) tablet, Take 1 tablet by mouth daily 1 tab by mouth daily  ipratropium - albuterol 0.5 mg/2.5 mg/3 mL (DUONEB) 0.5-2.5 (3) MG/3ML neb solution, Take 1 vial (3 mLs) by nebulization every 6 hours as needed for shortness of breath, wheezing or cough (Patient not taking: Reported on 1/22/2024)    sodium chloride (PF) 0.9% PF flush 3 mL               Exam:   /74 (BP Location: Left arm, Patient Position: Sitting, Cuff Size: Adult Regular)   Pulse 94   Ht 1.549 m (5' 1\")   Wt 83.5 kg (184 lb)   LMP  (LMP Unknown)   SpO2 91%   BMI 34.77 kg/m      Gen: well-appearing, NAD  CV: RRR, no murmurs  Resp: Normal respiratory effort on room air. Clear to auscultation bilaterally without wheezes, rales or ronchi.   Skin: no obvious rashes on gross evaluation  Neuro: alert and appropriate, no focal abnormalities         Data:     I have personally reviewed all pertinent labs, imaging studies and PFT results unless otherwise noted.    Labs:  Eosinophils 11/2023- 100    Imaging:  CT PE 11/24/2023-  IMPRESSION:  1.  No evidence of pulmonary embolus.  2.  Bilateral upper and lower lobe pneumonia.  3.  1.5 cm nodule right upper lobe has some features suggestive of  acute granulomatous disease. Three-month follow-up chest CT is  recommended.  4.  Mild right hilar and mediastinal lymphadenopathy can also be  reassessed on follow-up.    PFT: 1/22/2024- mild obstruction with significant bronchodilator response. Normal lung volumes and diffusion capacity.      "

## 2024-01-22 NOTE — NURSING NOTE
"Chief Complaint   Patient presents with    New Patient     New consult for multifocal pneumonia and persistent asthma.     She requests these members of her care team be copied on today's visit information:  PCP: Tisha Trevino    Referring Provider:  LYNDSEY Taylor CNP  [address] [place] [address]    Vitals:    01/22/24 1404   BP: 124/74   BP Location: Left arm   Patient Position: Sitting   Cuff Size: Adult Regular   Pulse: 94   SpO2: 91%   Weight: 83.5 kg (184 lb)   Height: 1.549 m (5' 1\")     Body mass index is 34.77 kg/m .    Medications were reconciled.        Nicole Gunter CMA    "

## 2024-01-22 NOTE — PATIENT INSTRUCTIONS
- Stop Flovent  - Start Dulera  - Follow up CT chest and with me in ~ 2 months. Make an appt in [place] [phone] (virtual is ok too)    High cost for medications could be the result of a formulary change, change in insurance and/or deductible. Below are the list of alternatives to your currently prescribed Dulera    Advair Diskus  Advair HFA  AirDuo Digihaler  Airduo RespiClick  Breo Ellipta  Symbicort  Wixela Inhub    Please contact your insurance company to identify the most cost-effective alternative. Please inform our clinic on which medication you would prefer and confirm which pharmacy you'd like the prescription sent. If any of the options are still not affordable, please contact our clinic at [phone] or send a Dragon[employer] message for next steps.    Regards,  [facility]

## 2024-01-23 DIAGNOSIS — J45.30 MILD PERSISTENT ASTHMA WITHOUT COMPLICATION: Primary | ICD-10-CM

## 2024-01-23 LAB
DLCOUNC-%PRED-PRE: 95 %
DLCOUNC-PRE: 15.45 ML/MIN/MMHG
DLCOUNC-PRED: 16.24 ML/MIN/MMHG
ERV-%PRED-PRE: 69 %
ERV-PRE: 0.47 L
ERV-PRED: 0.68 L
EXPTIME-PRE: 7.11 SEC
FEF2575-%PRED-POST: 150 %
FEF2575-%PRED-PRE: 65 %
FEF2575-POST: 1.99 L/SEC
FEF2575-PRE: 0.86 L/SEC
FEF2575-PRED: 1.32 L/SEC
FEFMAX-%PRED-PRE: 79 %
FEFMAX-PRE: 3.25 L/SEC
FEFMAX-PRED: 4.07 L/SEC
FEV1-%PRED-PRE: 83 %
FEV1-PRE: 1.28 L
FEV1FEV6-PRE: 70 %
FEV1FEV6-PRED: 78 %
FEV1FVC-PRE: 70 %
FEV1FVC-PRED: 78 %
FEV1SVC-PRE: 51 %
FEV1SVC-PRED: 62 %
FIFMAX-PRE: 2.7 L/SEC
FRCPLETH-%PRED-PRE: 111 %
FRCPLETH-PRE: 2.75 L
FRCPLETH-PRED: 2.47 L
FVC-%PRED-PRE: 93 %
FVC-PRE: 1.84 L
FVC-PRED: 1.97 L
IC-%PRED-PRE: 150 %
IC-PRE: 2.05 L
IC-PRED: 1.36 L
RVPLETH-%PRED-PRE: 112 %
RVPLETH-PRE: 2.27 L
RVPLETH-PRED: 2.02 L
TLCPLETH-%PRED-PRE: 114 %
TLCPLETH-PRE: 4.79 L
TLCPLETH-PRED: 4.18 L
VA-%PRED-PRE: 106 %
VA-PRE: 4.04 L
VC-%PRED-PRE: 101 %
VC-PRE: 2.52 L
VC-PRED: 2.47 L

## 2024-01-23 RX ORDER — FLUTICASONE PROPIONATE AND SALMETEROL 113; 14 UG/1; UG/1
1 POWDER, METERED RESPIRATORY (INHALATION) 2 TIMES DAILY
Qty: 3 EACH | Refills: 3 | Status: SHIPPED | OUTPATIENT
Start: 2024-01-23 | End: 2024-02-05

## 2024-01-23 NOTE — TELEPHONE ENCOUNTER
Rx for intermediate dose of Dulera has been discontinued d/t cost. Rx for Airduo Respiclick has been sent to Jackie for review/signature.    Paradise Vargas LPN  Pulmonary Medicine:  [facility]  Phone: [phone] Fax: [phone]

## 2024-01-23 NOTE — TELEPHONE ENCOUNTER
[facility] Call Center    Phone Message    May a detailed message be left on voicemail: no     Reason for Call: Medication Question or concern regarding medication   Prescription Clarification  Name of Medication: mometasone-formoterol (DULERA) 100-5 MCG/ACT inhaler   Prescribing Provider: Jackie Castillo   Pharmacy:   [facility] - [place], [place] - [address] [place] [address]      What on the order needs clarification?     Dulera is too expensive, would alternative rx Airduo respiclick sent to pharmacy instead.       Action Taken: Other: Pulm     Travel Screening: Not Applicable

## 2024-01-23 NOTE — TELEPHONE ENCOUNTER
Voicemail left for patient informing her of inhaler change. Requested call back to pulmonary clinic if there are any questions/concerns.    Paradise Vargas LPN  Pulmonary Medicine:  [facility]  Phone: [phone] Fax: [phone]

## 2024-01-24 DIAGNOSIS — J45.30 MILD PERSISTENT ASTHMA WITHOUT COMPLICATION: ICD-10-CM

## 2024-01-24 NOTE — TELEPHONE ENCOUNTER
Health Call Center    Phone Message    May a detailed message be left on voicemail: yes     Reason for Call: Medication Refill Request    Has the patient contacted the pharmacy for the refill? Yes   Name of medication being requested: Dulera  Provider who prescribed the medication: Jackie Castillo PA-C  Pharmacy: [email or web address]  Date medication is needed: ASAP     Call received from olivier Perdomo requesting Rx for Dulera to be sent to [email or web address] pharmacy. She reports there were concerns at one point regarding the cost of Dulera-pt didn't feel like she would be able to afford it, so she was prescribed alternative inhalers at that time. However, she has since realized that she can get her Dulera from [email or web address] for a reasonable price, so she is currently only using the Dulera and albuterol PRN inhalers (did not ever  the other ones).     Verbal consent received from pt to speak with olivier Perdomo for any questions/concerns/updates, she can be reached  [phone].    Action Taken: Other: Pulm    Travel Screening: Not Applicable

## 2024-01-25 DIAGNOSIS — I10 ESSENTIAL HYPERTENSION: ICD-10-CM

## 2024-01-25 RX ORDER — AMLODIPINE BESYLATE 2.5 MG/1
2.5 TABLET ORAL DAILY
Qty: 90 TABLET | Refills: 2 | Status: SHIPPED | OUTPATIENT
Start: 2024-01-25 | End: 2024-05-24

## 2024-02-05 ENCOUNTER — TELEPHONE (OUTPATIENT)
Dept: FAMILY MEDICINE | Facility: CLINIC | Age: 79
End: 2024-02-05

## 2024-02-05 ENCOUNTER — OFFICE VISIT (OUTPATIENT)
Dept: FAMILY MEDICINE | Facility: CLINIC | Age: 79
End: 2024-02-05
Payer: MEDICARE

## 2024-02-05 VITALS
TEMPERATURE: 98.1 F | OXYGEN SATURATION: 96 % | SYSTOLIC BLOOD PRESSURE: 136 MMHG | RESPIRATION RATE: 20 BRPM | BODY MASS INDEX: 34.23 KG/M2 | HEART RATE: 91 BPM | WEIGHT: 181.3 LBS | HEIGHT: 61 IN | DIASTOLIC BLOOD PRESSURE: 78 MMHG

## 2024-02-05 DIAGNOSIS — I10 ESSENTIAL HYPERTENSION: ICD-10-CM

## 2024-02-05 DIAGNOSIS — I10 BENIGN ESSENTIAL HYPERTENSION: ICD-10-CM

## 2024-02-05 DIAGNOSIS — Z13.220 SCREENING CHOLESTEROL LEVEL: ICD-10-CM

## 2024-02-05 DIAGNOSIS — F33.41 RECURRENT MAJOR DEPRESSIVE DISORDER, IN PARTIAL REMISSION (H): ICD-10-CM

## 2024-02-05 DIAGNOSIS — J18.9 MULTIFOCAL PNEUMONIA: ICD-10-CM

## 2024-02-05 DIAGNOSIS — I10 ESSENTIAL HYPERTENSION: Primary | ICD-10-CM

## 2024-02-05 DIAGNOSIS — F41.9 ANXIETY: ICD-10-CM

## 2024-02-05 DIAGNOSIS — E66.01 MORBID OBESITY (H): ICD-10-CM

## 2024-02-05 LAB
ALBUMIN SERPL BCG-MCNC: 4.3 G/DL (ref 3.5–5.2)
ALP SERPL-CCNC: 81 U/L (ref 40–150)
ALT SERPL W P-5'-P-CCNC: 14 U/L (ref 0–50)
ANION GAP SERPL CALCULATED.3IONS-SCNC: 11 MMOL/L (ref 7–15)
AST SERPL W P-5'-P-CCNC: 19 U/L (ref 0–45)
BILIRUB SERPL-MCNC: 0.4 MG/DL
BUN SERPL-MCNC: 13.8 MG/DL (ref 8–23)
CALCIUM SERPL-MCNC: 9.9 MG/DL (ref 8.8–10.2)
CHLORIDE SERPL-SCNC: 101 MMOL/L (ref 98–107)
CHOLEST SERPL-MCNC: 243 MG/DL
CREAT SERPL-MCNC: 0.79 MG/DL (ref 0.51–0.95)
DEPRECATED HCO3 PLAS-SCNC: 26 MMOL/L (ref 22–29)
EGFRCR SERPLBLD CKD-EPI 2021: 75 ML/MIN/1.73M2
FASTING STATUS PATIENT QL REPORTED: YES
FOLATE SERPL-MCNC: 9 NG/ML (ref 4.6–34.8)
GLUCOSE SERPL-MCNC: 90 MG/DL (ref 70–99)
HDLC SERPL-MCNC: 70 MG/DL
LDLC SERPL CALC-MCNC: 149 MG/DL
NONHDLC SERPL-MCNC: 173 MG/DL
POTASSIUM SERPL-SCNC: 4.8 MMOL/L (ref 3.4–5.3)
PROT SERPL-MCNC: 7.2 G/DL (ref 6.4–8.3)
SODIUM SERPL-SCNC: 138 MMOL/L (ref 135–145)
TRIGL SERPL-MCNC: 118 MG/DL
TSH SERPL DL<=0.005 MIU/L-ACNC: 3.13 UIU/ML (ref 0.3–4.2)
VIT B12 SERPL-MCNC: <150 PG/ML (ref 232–1245)

## 2024-02-05 PROCEDURE — 82607 VITAMIN B-12: CPT | Performed by: NURSE PRACTITIONER

## 2024-02-05 PROCEDURE — 82746 ASSAY OF FOLIC ACID SERUM: CPT | Performed by: NURSE PRACTITIONER

## 2024-02-05 PROCEDURE — 36415 COLL VENOUS BLD VENIPUNCTURE: CPT | Performed by: NURSE PRACTITIONER

## 2024-02-05 PROCEDURE — 99214 OFFICE O/P EST MOD 30 MIN: CPT | Performed by: NURSE PRACTITIONER

## 2024-02-05 PROCEDURE — 80053 COMPREHEN METABOLIC PANEL: CPT | Performed by: NURSE PRACTITIONER

## 2024-02-05 PROCEDURE — 84207 ASSAY OF VITAMIN B-6: CPT | Mod: 90 | Performed by: NURSE PRACTITIONER

## 2024-02-05 PROCEDURE — 99000 SPECIMEN HANDLING OFFICE-LAB: CPT | Performed by: NURSE PRACTITIONER

## 2024-02-05 PROCEDURE — 83921 ORGANIC ACID SINGLE QUANT: CPT | Performed by: NURSE PRACTITIONER

## 2024-02-05 PROCEDURE — 80061 LIPID PANEL: CPT | Performed by: NURSE PRACTITIONER

## 2024-02-05 PROCEDURE — 84425 ASSAY OF VITAMIN B-1: CPT | Mod: 90 | Performed by: NURSE PRACTITIONER

## 2024-02-05 PROCEDURE — 84443 ASSAY THYROID STIM HORMONE: CPT | Performed by: NURSE PRACTITIONER

## 2024-02-05 RX ORDER — ESCITALOPRAM OXALATE 20 MG/1
30 TABLET ORAL DAILY
Qty: 135 TABLET | Refills: 0 | Status: CANCELLED | OUTPATIENT
Start: 2024-02-05

## 2024-02-05 RX ORDER — LISINOPRIL 40 MG/1
40 TABLET ORAL DAILY
Qty: 90 TABLET | Refills: 3 | Status: SHIPPED | OUTPATIENT
Start: 2024-02-05

## 2024-02-05 RX ORDER — LISINOPRIL 40 MG/1
40 TABLET ORAL DAILY
Qty: 90 TABLET | Refills: 0 | Status: CANCELLED | OUTPATIENT
Start: 2024-02-05

## 2024-02-05 RX ORDER — ESCITALOPRAM OXALATE 20 MG/1
30 TABLET ORAL DAILY
Qty: 135 TABLET | Refills: 2 | Status: SHIPPED | OUTPATIENT
Start: 2024-02-05

## 2024-02-05 RX ORDER — ALBUTEROL SULFATE 90 UG/1
2 AEROSOL, METERED RESPIRATORY (INHALATION) EVERY 4 HOURS PRN
Qty: 18 G | Refills: 0 | Status: CANCELLED | OUTPATIENT
Start: 2024-02-05

## 2024-02-05 RX ORDER — ALBUTEROL SULFATE 90 UG/1
2 AEROSOL, METERED RESPIRATORY (INHALATION) EVERY 4 HOURS PRN
Qty: 18 G | Refills: 0 | Status: SHIPPED | OUTPATIENT
Start: 2024-02-05 | End: 2024-05-24

## 2024-02-05 ASSESSMENT — ANXIETY QUESTIONNAIRES
GAD7 TOTAL SCORE: 0
6. BECOMING EASILY ANNOYED OR IRRITABLE: NOT AT ALL
5. BEING SO RESTLESS THAT IT IS HARD TO SIT STILL: NOT AT ALL
1. FEELING NERVOUS, ANXIOUS, OR ON EDGE: NOT AT ALL
GAD7 TOTAL SCORE: 0
IF YOU CHECKED OFF ANY PROBLEMS ON THIS QUESTIONNAIRE, HOW DIFFICULT HAVE THESE PROBLEMS MADE IT FOR YOU TO DO YOUR WORK, TAKE CARE OF THINGS AT HOME, OR GET ALONG WITH OTHER PEOPLE: NOT DIFFICULT AT ALL
7. FEELING AFRAID AS IF SOMETHING AWFUL MIGHT HAPPEN: NOT AT ALL
2. NOT BEING ABLE TO STOP OR CONTROL WORRYING: NOT AT ALL
3. WORRYING TOO MUCH ABOUT DIFFERENT THINGS: NOT AT ALL

## 2024-02-05 ASSESSMENT — PATIENT HEALTH QUESTIONNAIRE - PHQ9
SUM OF ALL RESPONSES TO PHQ QUESTIONS 1-9: 0
SUM OF ALL RESPONSES TO PHQ QUESTIONS 1-9: 0
5. POOR APPETITE OR OVEREATING: NOT AT ALL
10. IF YOU CHECKED OFF ANY PROBLEMS, HOW DIFFICULT HAVE THESE PROBLEMS MADE IT FOR YOU TO DO YOUR WORK, TAKE CARE OF THINGS AT HOME, OR GET ALONG WITH OTHER PEOPLE: NOT DIFFICULT AT ALL

## 2024-02-05 NOTE — TELEPHONE ENCOUNTER
Message handled by Nurse Triage with [facility] - provider name: Tisha Trevino CNP - refills signed to new pharmacy .    MORA CAMERON RN on 2/5/2024 at 12:34 PM   [facility]

## 2024-02-05 NOTE — TELEPHONE ENCOUNTER
[facility] calling.     Patient is wanting to [facility].     Wanting ok from provider for 90 day refills on her medications.     Escitalopram 20  Lisinoprol 40  Albuterol 8.5/90      Routing to provider to review and advise.     Perla Alcantara RN  [facility]

## 2024-02-05 NOTE — PROGRESS NOTES
"  Assessment & Plan     Essential hypertension  - Comprehensive metabolic panel (BMP + Alb, Alk Phos, ALT, AST, Total. Bili, TP)  - Comprehensive metabolic panel (BMP + Alb, Alk Phos, ALT, AST, Total. Bili, TP)    Screening cholesterol level  - Lipid panel reflex to direct LDL Fasting  - Lipid panel reflex to direct LDL Fasting    Recurrent major depressive disorder, in partial remission (H24)  Stable on regimen    Morbid obesity (H)  Continue to work on weight loss which will contribute to improved management of HTN, HLD        Review of the result(s) of each unique test - lab  Ordering of each unique test        BMI  Estimated body mass index is 34.26 kg/m  as calculated from the following:    Height as of this encounter: 1.549 m (5' 1\").    Weight as of this encounter: 82.2 kg (181 lb 4.8 oz).   Weight management plan: Discussed healthy diet and exercise guidelines      See Patient Instructions    Rossy Almanza is a 78 year old, presenting for the following health issues:  Recheck Medication        2/5/2024    11:23 AM   Additional Questions   Roomed by Brittany Prather MA     History of Present Illness       Reason for visit:  Neurology wants me to be on statin    She eats 0-1 servings of fruits and vegetables daily.She consumes 0 sweetened beverage(s) daily.She exercises with enough effort to increase her heart rate 9 or less minutes per day.  She exercises with enough effort to increase her heart rate 3 or less days per week.   She is taking medications regularly.     Blood work    Hypertension Follow-up    Do you check your blood pressure regularly outside of the clinic? No   Are you following a low salt diet? No  Are your blood pressures ever more than 140 on the top number (systolic) OR more   than 90 on the bottom number (diastolic), for example 140/90? No not applicable    Depression and Anxiety Follow-Up  How are you doing with your depression since your last visit? Improved  How are you doing with your " anxiety since your last visit?  No change  Are you having other symptoms that might be associated with depression or anxiety? No  Have you had a significant life event? No   Do you have any concerns with your use of alcohol or other drugs? No    Social History     Tobacco Use    Smoking status: Never    Smokeless tobacco: Never   Vaping Use    Vaping Use: Never used   Substance Use Topics    Alcohol use: Yes     Comment: 3 drinks per week    Drug use: Never         7/12/2022     8:47 AM 7/14/2023     9:04 AM 2/5/2024    11:00 AM   PHQ   PHQ-9 Total Score 0 2 0   Q9: Thoughts of better off dead/self-harm past 2 weeks Not at all Not at all Not at all         7/6/2020    11:00 AM 7/14/2023     9:06 AM 2/5/2024    12:03 PM   TYLER-7 SCORE   Total Score  5 (mild anxiety)    Total Score 7 5 0         2/5/2024    11:00 AM   Last PHQ-9   1.  Little interest or pleasure in doing things 0   2.  Feeling down, depressed, or hopeless 0   3.  Trouble falling or staying asleep, or sleeping too much 0   4.  Feeling tired or having little energy 0   5.  Poor appetite or overeating 0   6.  Feeling bad about yourself 0   7.  Trouble concentrating 0   8.  Moving slowly or restless 0   Q9: Thoughts of better off dead/self-harm past 2 weeks 0   PHQ-9 Total Score 0       Suicide Assessment Five-step Evaluation and Treatment (SAFE-T)    How many servings of fruits and vegetables do you eat daily?  0-1  On average, how many sweetened beverages do you drink each day (Examples: soda, juice, sweet tea, etc.  Do NOT count diet or artificially sweetened beverages)?   0  How many days per week do you exercise enough to make your heart beat faster? 3 or less  How many minutes a day do you exercise enough to make your heart beat faster? 9 or less  How many days per week do you miss taking your medication? 0    Review of Systems  Constitutional, HEENT, cardiovascular, pulmonary, GI, , musculoskeletal, neuro, skin, endocrine and psych systems are  "negative, except as otherwise noted.      Objective    /78   Pulse 91   Temp 98.1  F (36.7  C) (Tympanic)   Resp 20   Ht 1.549 m (5' 1\")   Wt 82.2 kg (181 lb 4.8 oz)   LMP  (LMP Unknown)   SpO2 96%   BMI 34.26 kg/m    Body mass index is 34.26 kg/m .  Physical Exam   GENERAL: alert and no distress  NECK: no adenopathy, no asymmetry, masses, or scars  RESP: lungs clear to auscultation - no rales, rhonchi or wheezes  CV: regular rate and rhythm, normal S1 S2, no S3 or S4, no murmur, click or rub, no peripheral edema  ABDOMEN: soft, nontender, no hepatosplenomegaly, no masses and bowel sounds normal  MS: no gross musculoskeletal defects noted, no edema          Signed Electronically by: Tisha Trevino CNP    "

## 2024-02-06 DIAGNOSIS — E53.8 VITAMIN B12 DEFICIENCY (NON ANEMIC): Primary | ICD-10-CM

## 2024-02-07 LAB
METHYLMALONATE SERPL-SCNC: 1.23 UMOL/L (ref 0–0.4)
PYRIDOXAL PHOS SERPL-SCNC: 35.9 NMOL/L

## 2024-02-08 LAB — VIT B1 PYROPHOSHATE BLD-SCNC: 141 NMOL/L

## 2024-02-29 ENCOUNTER — HOSPITAL ENCOUNTER (OUTPATIENT)
Dept: CT IMAGING | Facility: CLINIC | Age: 79
Discharge: HOME | End: 2024-02-29
Attending: PHYSICIAN ASSISTANT | Admitting: PHYSICIAN ASSISTANT
Payer: MEDICARE

## 2024-02-29 ENCOUNTER — MYC MEDICAL ADVICE (OUTPATIENT)
Dept: PULMONOLOGY | Facility: CLINIC | Age: 79
End: 2024-02-29
Payer: MEDICARE

## 2024-02-29 DIAGNOSIS — R93.89 ABNORMAL CT OF THE CHEST: ICD-10-CM

## 2024-02-29 DIAGNOSIS — J18.9 MULTIFOCAL PNEUMONIA: ICD-10-CM

## 2024-02-29 PROCEDURE — 71250 CT THORAX DX C-: CPT | Mod: ME

## 2024-02-29 NOTE — RESULT ENCOUNTER NOTE
Please notify patent that her CT is improved and the infiltrates are resolved. At our follow up in April we can discuss if a repeat CT in 1 year is necessary to follow a stable calcified nodule.  calm

## 2024-03-07 ENCOUNTER — TELEPHONE (OUTPATIENT)
Dept: PULMONOLOGY | Facility: CLINIC | Age: 79
End: 2024-03-07
Payer: MEDICARE

## 2024-03-07 NOTE — TELEPHONE ENCOUNTER
Left VM for patient stating her CT is improved and the infiltrates are resolved. When pt sees Jackie in April they can discuss if a repeat CT in 1 year is necessary to follow a stable calcified nodule. Left clinic number is she had any further questions.     Teresa Coon RN on 3/7/2024 at 9:02 AM

## 2024-04-04 ENCOUNTER — CONTACT MOVED (OUTPATIENT)
Age: 79
End: 2024-04-04
Payer: MEDICARE

## 2024-04-23 ENCOUNTER — OFFICE VISIT (OUTPATIENT)
Dept: PULMONOLOGY | Facility: CLINIC | Age: 79
End: 2024-04-23
Payer: MEDICARE

## 2024-04-23 VITALS
SYSTOLIC BLOOD PRESSURE: 152 MMHG | HEART RATE: 64 BPM | WEIGHT: 187.9 LBS | OXYGEN SATURATION: 97 % | RESPIRATION RATE: 16 BRPM | DIASTOLIC BLOOD PRESSURE: 80 MMHG | BODY MASS INDEX: 35.5 KG/M2

## 2024-04-23 DIAGNOSIS — R93.89 ABNORMAL CT OF THE CHEST: Primary | ICD-10-CM

## 2024-04-23 PROCEDURE — 99215 OFFICE O/P EST HI 40 MIN: CPT | Performed by: PHYSICIAN ASSISTANT

## 2024-04-23 RX ORDER — LANOLIN ALCOHOL/MO/W.PET/CERES
1000 CREAM (GRAM) TOPICAL DAILY
COMMUNITY

## 2024-04-23 NOTE — PROGRESS NOTES
[facility]- Pulmonary Clinic  Pulmonary Follow Up    Name: Cami Turner MRN: [id number]     Age: 78 year old   YOB: 1945       Reason for Visit:   Chief Complaint   Patient presents with    RECHECK     Abnormal CT of the chest +2 more               Assessment and Plan:     Cami Turner is a 78 year old female with history of asthma and HTN who presents for post hospital follow up of pneumonia. Here with her caretaker Cecy    # Moderate intermittent asthma  Hospitalized 11/24-11/26/2023 with pneumonia, improved with antibiotics and steroids. She has history of mild asthma dating back to childhood with rare symptoms, used albuterol inhaler sparingly and has never been hospitalized or had urgent care visits for asthma. ACT 23 today. PFTs demonstrated mild obstruction with significant bronchodilator response (+27%), consistent with asthma. Endorses mild seasonal allergies, takes OTC antihistamine PRN with benefit. Peripheral eosinophils 100 (11/24/2023). At our last clinic visit 1/2024 I recommended she stop ICS (Flovent) as prescribed from the hospital, and start ICS-LABA per asthma gold standard recommendations. She presents today reporting that Dulera has been helpful but she takes it more so on an as needed bases, on avg 1 puff 1-2x daily. This works well for her, so I recommend she continue Dulera PRN. She can continue to use albuterol inhaler/nebulizer, and OTC antihistamines PRN.   - Continue ICS/LABA (Dulera 100-5). OK to substitute with formulary equivalent per insurance.   - OTC antihistamines PRN    # Abnormal CT chest / Pulmonary nodule  CT PE 11/24/2023 during hospitalization with pneumonia noted 1.5cm RUL nodule, suggestive of acute granulomatous. She is considered low risk. Follow up CT chest 2/29/2024 with stable RUL nodule, other inflammatory changes are resolved. Will repeat CT in 12 months though I have a low suspicion for malignancy since it is now centrally calcified.         Return to clinic in 12 months with repeat CT      45 minutes spent reviewing chart, reviewing test results, talking with and examining patient, formulating plan, and documentation on the day of the encounter.    Jackie Castillo PA-C  General Pulmonology  Pager #[phone]             HPI:   Cami Turner is a 78 year old female with history of asthma and HTN who presents for post hospital follow up of pneumonia. Here with her caretaker Cecy. I last saw her 1/2024.     Hospitalized at [facility] 11/24-11/26/2023 with shortness of breath, cough and wheeze. CT with bilateral upper and lower lobe infiltrates concerning for pneumonia. Managed with antibiotics and steroids. Discharged with flovent , refills of albuterol inhaler PRN, and duoneb PRN. Has history of asthma and allergies in childhood. At our last clinic visit I switched her from flovent to dulera and ordered a follow up CT chest (2/29/2024).     She feels better with Dulera, just needs 1 puff 1-2x daily- it looses up the phlegm in her chest/throat that she wakes up with sometimes. No wheeze or dyspnea. No seasonal allergies. No cough, GERD, LE edema, dysphagia. Dulera seems more effective than flovent. She has a nebulizer machine but doesn't use it. Doesn't use albuterol inhaler either    10 point ROS performed and negative except for as noted in HPI.    Tobacco or vaping: never a smoker  Up to date COVID, flu, pneumococcal and RSV vaccines           Past Medical History:     Past Medical History:   Diagnosis Date    Morbid obesity (H) 07/06/2020             Past Surgical History:      Past Surgical History:   Procedure Laterality Date    ANKLE SURGERY Left     HIP SURGERY Right              Social History:     Social History     Socioeconomic History    Marital status:      Spouse name: Not on file    Number of children: Not on file    Years of education: Not on file    Highest education level: Not on file   Occupational History    Not on file   Tobacco Use     Smoking status: Never    Smokeless tobacco: Never   Vaping Use    Vaping status: Never Used   Substance and Sexual Activity    Alcohol use: Yes     Comment: 3 drinks per week    Drug use: Never    Sexual activity: Not Currently     Partners: Male   Other Topics Concern    Not on file   Social History Narrative    Not on file     Social Determinants of Health     Financial Resource Strain: Low Risk  (2/5/2024)    Financial Resource Strain     Within the past 12 months, have you or your family members you live with been unable to get utilities (heat, electricity) when it was really needed?: No   Food Insecurity: Low Risk  (2/5/2024)    Food Insecurity     Within the past 12 months, did you worry that your food would run out before you got money to buy more?: No     Within the past 12 months, did the food you bought just not last and you didn t have money to get more?: No   Transportation Needs: Low Risk  (2/5/2024)    Transportation Needs     Within the past 12 months, has lack of transportation kept you from medical appointments, getting your medicines, non-medical meetings or appointments, work, or from getting things that you need?: No   Physical Activity: Not on file   Stress: Not on file   Social Connections: Not on file   Interpersonal Safety: Low Risk  (2/5/2024)    Interpersonal Safety     Do you feel physically and emotionally safe where you currently live?: Yes     Within the past 12 months, have you been hit, slapped, kicked or otherwise physically hurt by someone?: No     Within the past 12 months, have you been humiliated or emotionally abused in other ways by your partner or ex-partner?: No   Housing Stability: Low Risk  (2/5/2024)    Housing Stability     Do you have housing? : Yes     Are you worried about losing your housing?: No            Family History:   No family history on file.          Allergies:   No Known Allergies          Medications:     Current Outpatient Medications   Medication Sig  Dispense Refill    amLODIPine (NORVASC) 2.5 MG tablet Take 1 tablet (2.5 mg) by mouth daily 90 tablet 2    aspirin (ASA) 81 MG chewable tablet Take 81 mg by mouth daily Take 1 tab daily      cyanocobalamin (VITAMIN B-12) 1000 MCG tablet Take 1,000 mcg by mouth daily      escitalopram (LEXAPRO) 20 MG tablet Take 1.5 tablets (30 mg) by mouth daily 135 tablet 2    lisinopril (ZESTRIL) 40 MG tablet Take 1 tablet (40 mg) by mouth daily 90 tablet 3    mometasone-formoterol (DULERA) 100-5 MCG/ACT inhaler Inhale 2 puffs into the lungs 2 times daily (Patient taking differently: Inhale 1 puff into the lungs daily) 13 g 11    vitamin D3 (CHOLECALCIFEROL) 50 mcg (2000 units) tablet Take 1 tablet by mouth daily 1 tab by mouth daily      albuterol (PROAIR HFA/PROVENTIL HFA/VENTOLIN HFA) 108 (90 Base) MCG/ACT inhaler Inhale 2 puffs into the lungs every 4 hours as needed for shortness of breath, wheezing or cough 18 g 0    ipratropium - albuterol 0.5 mg/2.5 mg/3 mL (DUONEB) 0.5-2.5 (3) MG/3ML neb solution Take 1 vial (3 mLs) by nebulization every 6 hours as needed for shortness of breath, wheezing or cough 90 mL 0     Current Facility-Administered Medications   Medication Dose Route Frequency Provider Last Rate Last Admin    sodium chloride (PF) 0.9% PF flush 3 mL  3 mL Intravenous q1 min prn Robbie Still MD                  Exam:   BP (!) 152/80 (BP Location: Left arm, Patient Position: Sitting, Cuff Size: Adult Large)   Pulse 64   Resp 16   Wt 85.2 kg (187 lb 14.4 oz)   LMP  (LMP Unknown)   SpO2 97%   BMI 35.50 kg/m      Gen: well-appearing, NAD  Resp: Normal respiratory effort on room air.   Skin: no obvious rashes on gross evaluation  Neuro: alert and appropriate, no focal abnormalities         Data:     I have personally reviewed all pertinent labs, imaging studies and PFT results unless otherwise noted.    Labs:  Eosinophils 11/2023- 100    Imaging:  CT chest w/o 2/29/24-  1.  Previously seen  infiltrates have resolved.   2.  Stable nodule with a central calcification suggestive of   granulomatous change in the superior segment of the right lower lobe.   Consider an additional 12-month follow-up study to confirm stability.   3.  No gross adenopathy demonstrated in the absence of contrast. This   has probably improved since previous and may have been reactive.     CT PE 11/24/2023-  IMPRESSION:  1.  No evidence of pulmonary embolus.  2.  Bilateral upper and lower lobe pneumonia.  3.  1.5 cm nodule right upper lobe has some features suggestive of  acute granulomatous disease. Three-month follow-up chest CT is  recommended.  4.  Mild right hilar and mediastinal lymphadenopathy can also be  reassessed on follow-up.    PFT: 1/22/2024- mild obstruction with significant bronchodilator response. Normal lung volumes and diffusion capacity.

## 2024-04-23 NOTE — LETTER
4/23/2024         RE: Cami Turner  [address]  [place] [address]        Dear Colleague,    Thank you for referring your patient, Cami Turner, to the [facility] [place]. Please see a copy of my visit note below.    [facility]- Pulmonary Clinic  Pulmonary Follow Up    Name: Cami Turner MRN: [id number]     Age: 78 year old   YOB: 1945       Reason for Visit:   Chief Complaint   Patient presents with     RECHECK     Abnormal CT of the chest +2 more               Assessment and Plan:     Cami Turner is a 78 year old female with history of asthma and HTN who presents for post hospital follow up of pneumonia. Here with her caretaker Cecy    # Moderate intermittent asthma  Hospitalized 11/24-11/26/2023 with pneumonia, improved with antibiotics and steroids. She has history of mild asthma dating back to childhood with rare symptoms, used albuterol inhaler sparingly and has never been hospitalized or had urgent care visits for asthma. ACT 23 today. PFTs demonstrated mild obstruction with significant bronchodilator response (+27%), consistent with asthma. Endorses mild seasonal allergies, takes OTC antihistamine PRN with benefit. Peripheral eosinophils 100 (11/24/2023). At our last clinic visit 1/2024 I recommended she stop ICS (Flovent) as prescribed from the hospital, and start ICS-LABA per asthma gold standard recommendations. She presents today reporting that Dulera has been helpful but she takes it more so on an as needed bases, on avg 1 puff 1-2x daily. This works well for her, so I recommend she continue Dulera PRN. She can continue to use albuterol inhaler/nebulizer, and OTC antihistamines PRN.   - Continue ICS/LABA (Dulera 100-5). OK to substitute with formulary equivalent per insurance.   - OTC antihistamines PRN    # Abnormal CT chest / Pulmonary nodule  CT PE 11/24/2023 during hospitalization with pneumonia noted 1.5cm RUL nodule, suggestive  of acute granulomatous. She is considered low risk. Follow up CT chest 2/29/2024 with stable RUL nodule, other inflammatory changes are resolved. Will repeat CT in 12 months though I have a low suspicion for malignancy since it is now centrally calcified.        Return to clinic in 12 months with repeat CT      45 minutes spent reviewing chart, reviewing test results, talking with and examining patient, formulating plan, and documentation on the day of the encounter.    Jackie Castillo PA-C  General Pulmonology  Pager #[phone]             HPI:   Cami Turner is a 78 year old female with history of asthma and HTN who presents for post hospital follow up of pneumonia. Here with her caretaker Cecy. I last saw her 1/2024.     Hospitalized at [facility] 11/24-11/26/2023 with shortness of breath, cough and wheeze. CT with bilateral upper and lower lobe infiltrates concerning for pneumonia. Managed with antibiotics and steroids. Discharged with flovent , refills of albuterol inhaler PRN, and duoneb PRN. Has history of asthma and allergies in childhood. At our last clinic visit I switched her from flovent to dulera and ordered a follow up CT chest (2/29/2024).     She feels better with Dulera, just needs 1 puff 1-2x daily- it looses up the phlegm in her chest/throat that she wakes up with sometimes. No wheeze or dyspnea. No seasonal allergies. No cough, GERD, LE edema, dysphagia. Dulera seems more effective than flovent. She has a nebulizer machine but doesn't use it. Doesn't use albuterol inhaler either    10 point ROS performed and negative except for as noted in HPI.    Tobacco or vaping: never a smoker  Up to date COVID, flu, pneumococcal and RSV vaccines           Past Medical History:     Past Medical History:   Diagnosis Date     Morbid obesity (H) 07/06/2020             Past Surgical History:      Past Surgical History:   Procedure Laterality Date     ANKLE SURGERY Left      HIP SURGERY Right              Social History:      Social History     Socioeconomic History     Marital status:      Spouse name: Not on file     Number of children: Not on file     Years of education: Not on file     Highest education level: Not on file   Occupational History     Not on file   Tobacco Use     Smoking status: Never     Smokeless tobacco: Never   Vaping Use     Vaping status: Never Used   Substance and Sexual Activity     Alcohol use: Yes     Comment: 3 drinks per week     Drug use: Never     Sexual activity: Not Currently     Partners: Male   Other Topics Concern     Not on file   Social History Narrative     Not on file     Social Determinants of Health     Financial Resource Strain: Low Risk  (2/5/2024)    Financial Resource Strain      Within the past 12 months, have you or your family members you live with been unable to get utilities (heat, electricity) when it was really needed?: No   Food Insecurity: Low Risk  (2/5/2024)    Food Insecurity      Within the past 12 months, did you worry that your food would run out before you got money to buy more?: No      Within the past 12 months, did the food you bought just not last and you didn t have money to get more?: No   Transportation Needs: Low Risk  (2/5/2024)    Transportation Needs      Within the past 12 months, has lack of transportation kept you from medical appointments, getting your medicines, non-medical meetings or appointments, work, or from getting things that you need?: No   Physical Activity: Not on file   Stress: Not on file   Social Connections: Not on file   Interpersonal Safety: Low Risk  (2/5/2024)    Interpersonal Safety      Do you feel physically and emotionally safe where you currently live?: Yes      Within the past 12 months, have you been hit, slapped, kicked or otherwise physically hurt by someone?: No      Within the past 12 months, have you been humiliated or emotionally abused in other ways by your partner or ex-partner?: No   Housing Stability: Low Risk   (2/5/2024)    Housing Stability      Do you have housing? : Yes      Are you worried about losing your housing?: No            Family History:   No family history on file.          Allergies:   No Known Allergies          Medications:     Current Outpatient Medications   Medication Sig Dispense Refill     amLODIPine (NORVASC) 2.5 MG tablet Take 1 tablet (2.5 mg) by mouth daily 90 tablet 2     aspirin (ASA) 81 MG chewable tablet Take 81 mg by mouth daily Take 1 tab daily       cyanocobalamin (VITAMIN B-12) 1000 MCG tablet Take 1,000 mcg by mouth daily       escitalopram (LEXAPRO) 20 MG tablet Take 1.5 tablets (30 mg) by mouth daily 135 tablet 2     lisinopril (ZESTRIL) 40 MG tablet Take 1 tablet (40 mg) by mouth daily 90 tablet 3     mometasone-formoterol (DULERA) 100-5 MCG/ACT inhaler Inhale 2 puffs into the lungs 2 times daily (Patient taking differently: Inhale 1 puff into the lungs daily) 13 g 11     vitamin D3 (CHOLECALCIFEROL) 50 mcg (2000 units) tablet Take 1 tablet by mouth daily 1 tab by mouth daily       albuterol (PROAIR HFA/PROVENTIL HFA/VENTOLIN HFA) 108 (90 Base) MCG/ACT inhaler Inhale 2 puffs into the lungs every 4 hours as needed for shortness of breath, wheezing or cough 18 g 0     ipratropium - albuterol 0.5 mg/2.5 mg/3 mL (DUONEB) 0.5-2.5 (3) MG/3ML neb solution Take 1 vial (3 mLs) by nebulization every 6 hours as needed for shortness of breath, wheezing or cough 90 mL 0     Current Facility-Administered Medications   Medication Dose Route Frequency Provider Last Rate Last Admin     sodium chloride (PF) 0.9% PF flush 3 mL  3 mL Intravenous q1 min prn Robbie Still MD                  Exam:   BP (!) 152/80 (BP Location: Left arm, Patient Position: Sitting, Cuff Size: Adult Large)   Pulse 64   Resp 16   Wt 85.2 kg (187 lb 14.4 oz)   LMP  (LMP Unknown)   SpO2 97%   BMI 35.50 kg/m      Gen: well-appearing, NAD  Resp: Normal respiratory effort on room air.   Skin: no obvious  rashes on gross evaluation  Neuro: alert and appropriate, no focal abnormalities         Data:     I have personally reviewed all pertinent labs, imaging studies and PFT results unless otherwise noted.    Labs:  Eosinophils 11/2023- 100    Imaging:  CT chest w/o 2/29/24-  1.  Previously seen infiltrates have resolved.   2.  Stable nodule with a central calcification suggestive of   granulomatous change in the superior segment of the right lower lobe.   Consider an additional 12-month follow-up study to confirm stability.   3.  No gross adenopathy demonstrated in the absence of contrast. This   has probably improved since previous and may have been reactive.     CT PE 11/24/2023-  IMPRESSION:  1.  No evidence of pulmonary embolus.  2.  Bilateral upper and lower lobe pneumonia.  3.  1.5 cm nodule right upper lobe has some features suggestive of  acute granulomatous disease. Three-month follow-up chest CT is  recommended.  4.  Mild right hilar and mediastinal lymphadenopathy can also be  reassessed on follow-up.    PFT: 1/22/2024- mild obstruction with significant bronchodilator response. Normal lung volumes and diffusion capacity.          Again, thank you for allowing me to participate in the care of your patient.        Sincerely,        Jackie Castillo PA-C

## 2024-04-23 NOTE — NURSING NOTE
Chief Complaint   Patient presents with    RECHECK     Abnormal CT of the chest +2 more         Vitals:    04/23/24 1230 04/23/24 1236   BP: (!) 156/85 (!) 152/80   BP Location: Left arm Left arm   Patient Position: Sitting Sitting   Cuff Size: Adult Large Adult Large   Pulse: 64    Resp: 16    SpO2: 97%    Weight: 85.2 kg (187 lb 14.4 oz)        Body mass index is 35.5 kg/m .      Gurdeep Alcaraz MA

## 2024-05-24 ENCOUNTER — OFFICE VISIT (OUTPATIENT)
Dept: FAMILY MEDICINE | Facility: CLINIC | Age: 79
End: 2024-05-24
Payer: MEDICARE

## 2024-05-24 VITALS
BODY MASS INDEX: 35.67 KG/M2 | TEMPERATURE: 98.8 F | OXYGEN SATURATION: 96 % | HEART RATE: 80 BPM | RESPIRATION RATE: 18 BRPM | WEIGHT: 188.8 LBS | SYSTOLIC BLOOD PRESSURE: 154 MMHG | DIASTOLIC BLOOD PRESSURE: 80 MMHG

## 2024-05-24 DIAGNOSIS — E78.2 MIXED HYPERLIPIDEMIA: ICD-10-CM

## 2024-05-24 DIAGNOSIS — I10 ESSENTIAL HYPERTENSION: Primary | ICD-10-CM

## 2024-05-24 DIAGNOSIS — E66.01 MORBID OBESITY (H): ICD-10-CM

## 2024-05-24 PROCEDURE — 99214 OFFICE O/P EST MOD 30 MIN: CPT | Performed by: NURSE PRACTITIONER

## 2024-05-24 RX ORDER — ATORVASTATIN CALCIUM 10 MG/1
10 TABLET, FILM COATED ORAL DAILY
Qty: 90 TABLET | Refills: 3 | Status: SHIPPED | OUTPATIENT
Start: 2024-05-24

## 2024-05-24 RX ORDER — AMLODIPINE BESYLATE 5 MG/1
5 TABLET ORAL DAILY
Qty: 90 TABLET | Refills: 3 | Status: SHIPPED | OUTPATIENT
Start: 2024-05-24

## 2024-05-24 NOTE — PROGRESS NOTES
Assessment & Plan     Essential hypertension  Increase amlodipine to 5 mg daily.  Will return for labs at her annual wellness visit in July.  - amLODIPine (NORVASC) 5 MG tablet  Dispense: 90 tablet; Refill: 3    Morbid obesity (H)  Continue to work on weight loss which will contribute to improved management of HTN      Mixed hyperlipidemia  Discussed benefit risk of starting statin.  Has elected to trial and we will follow-up on labs as well at her annual wellness.  - atorvastatin (LIPITOR) 10 MG tablet  Dispense: 90 tablet; Refill: 3        Rossy Almanza is a 78 year old, presenting for the following health issues:  Recheck Medication        5/24/2024    10:40 AM   Additional Questions   Roomed by Brittany SCHWARZ     Discuss cholesterol results and possible statin drug    Right subclavian artery stenosis. Neurology was recommended statin due to this previously.          Constitutional, HEENT, cardiovascular, pulmonary, GI, , musculoskeletal, neuro, skin, endocrine and psych systems are negative, except as otherwise noted.        Objective    BP (!) 148/80   Pulse 80   Temp 98.8  F (37.1  C) (Oral)   Resp 18   Wt 85.6 kg (188 lb 12.8 oz)   LMP  (LMP Unknown)   SpO2 96%   BMI 35.67 kg/m    Body mass index is 35.67 kg/m .  Physical Exam   GENERAL: alert and no distress  NECK: no adenopathy, no asymmetry, masses, or scars  RESP: lungs clear to auscultation - no rales, rhonchi or wheezes  CV: regular rate and rhythm, normal S1 S2, no S3 or S4, no murmur, click or rub, no peripheral edema  ABDOMEN: soft, nontender, no hepatosplenomegaly, no masses and bowel sounds normal  MS: no gross musculoskeletal defects noted, no edema            Signed Electronically by: Tisha Trevino, CNP

## 2024-06-07 ENCOUNTER — TELEPHONE (OUTPATIENT)
Dept: NEUROLOGY | Facility: CLINIC | Age: 79
End: 2024-06-07
Payer: MEDICARE

## 2024-06-07 NOTE — TELEPHONE ENCOUNTER
Attempted to reach patient to remind them about appointment scheduled with Gilson Casarez MD on 6/10/24 in our [place] location.    A voicemail was left with a call back number if the patient has questions or would like to reschedule.

## 2024-06-10 ENCOUNTER — OFFICE VISIT (OUTPATIENT)
Dept: NEUROLOGY | Facility: CLINIC | Age: 79
End: 2024-06-10
Payer: MEDICARE

## 2024-06-10 VITALS — HEART RATE: 73 BPM | OXYGEN SATURATION: 98 % | DIASTOLIC BLOOD PRESSURE: 83 MMHG | SYSTOLIC BLOOD PRESSURE: 140 MMHG

## 2024-06-10 DIAGNOSIS — E53.8 VITAMIN B12 DEFICIENCY (NON ANEMIC): ICD-10-CM

## 2024-06-10 DIAGNOSIS — G31.84 MILD COGNITIVE IMPAIRMENT: Primary | ICD-10-CM

## 2024-06-10 PROCEDURE — 99214 OFFICE O/P EST MOD 30 MIN: CPT | Performed by: PSYCHIATRY & NEUROLOGY

## 2024-06-10 PROCEDURE — G2211 COMPLEX E/M VISIT ADD ON: HCPCS | Performed by: PSYCHIATRY & NEUROLOGY

## 2024-06-10 ASSESSMENT — MONTREAL COGNITIVE ASSESSMENT (MOCA)
12. MEMORY INDEX SCORE: 0
9. REPEAT EACH SENTENCE: 1
7. [VIGILENCE] TAP WHEN HEARING DESIGNATED LETTER: 1
4. NAME EACH OF THE THREE ANIMALS SHOWN: 2
WHAT LEVEL OF EDUCATION WAS ATTAINED: 0
11. FOR EACH PAIR OF WORDS, WHAT CATEGORY DO THEY BELONG TO (OUT OF 2): 2
8. SERIAL SUBTRACTION OF 7S: 3
13. ORIENTATION SUBSCORE: 6
WHAT IS THE TOTAL SCORE (OUT OF 30): 18
6. READ LIST OF DIGITS [FORWARD/BACKWARD]: 2
10. [FLUENCY] NAME WORDS STARTING WITH DESIGNATED LETTER: 0
VISUOSPATIAL/EXECUTIVE SUBSCORE: 1

## 2024-06-10 NOTE — LETTER
6/10/2024      Cami Turner  [address]  [place] [address]      Dear Colleague,    Thank you for referring your patient, Cami Turner, to the [facility]OLOG[facility]. Please see a copy of my visit note below.    ESTABLISHED PATIENT NEUROLOGY NOTE    DATE OF VISIT: 6/10/2024  CLINIC LOCATION: [facility]  MRN: [id number]  PATIENT NAME: Cami Turner  YOB: 1945    REASON FOR VISIT:   Chief Complaint   Patient presents with     Follow Up     Follow up memory concerns  Review neuropsych testing     SUBJECTIVE:                                                      HISTORY OF PRESENT ILLNESS: Patient is here to follow up regarding mild cognitive impairment.  The last visit was on 2023.  Please refer to my initial/other prior notes for further information. Accompanied by caregiver.    Since the last visit, the patient reports that her memory is stable.  She is on B12 placement since 2024 per caregiver estimate (the patient is unsure).  She denies interval development of new neurological symptoms.    Laboratory evaluation from 2024 demonstrated normal TSH (3.13), vitamin B1 (141), B6 (36.9), and folate along with elevated methylmalonic acid (1.23), and low vitamin B12 (less than 150).  I advised her to initiate B12 replacement following that with repeat testing in 2 months after that.  This was not done.  EXAM:                                                    Physical Exam:   Vitals: BP (!) 140/83 (BP Location: Right arm, Patient Position: Supine, Cuff Size: Adult Regular)   Pulse 73   LMP  (LMP Unknown)   SpO2 98%   [place] Cognitive Assessment:    [place] Cognitive Assessment (MOCA)  Visuospatial/Executive : 1  Namin  Attention - Digits: 2  Attention - Letters: 1  Attention - Subtraction: 3  Language - Repeat: 1  Language - Fluency : 0  Abstraction: 2  Delayed Recall: 0  Orientation: 6  Education: 0  MOCA Score:  18  Administered by: : Felicita Berg     [place] Cognitive Assessment Score:  MOCA Score: 18/30.   General: pt is in NAD, cooperative.  Skin: normal turgor, moist mucous membranes, no lesions/rashes noticed.  HEENT: ATNC, white sclera, normal conjunctiva.  Respiratory: Symmetric lung excursion, no accessory respiratory muscle use.  Abdomen: Non distended.  Neurological: awake, cooperative, follows commands, no aphasia or dysarthria noted, face is symmetric, equally moves all extremities, no dysmetria bilaterally.  ASSESSMENT AND PLAN:                                                    Assessment: 78 year old female patient presents for follow-up of mild cognitive impairment after completion of laboratory workup that demonstrated vitamin B12 deficiency.  She was advised to start on replacement, but did not recheck her blood work following that.  I will add MMA level.  She reports that her memory is stable.  MoCA today is 18/30, compared to 20/30 in June 2023.    Diagnoses:    ICD-10-CM    1. Mild cognitive impairment  G31.84       2. Vitamin B12 deficiency (non anemic)  E53.8         Plan: At today's visit we thoroughly discussed current symptoms, evaluation results, diagnosis, available treatment options, and the plan.    We decided to continue B12 without changes.  I asked the patient to check B12/MMA levels 2 months after starting the replacement.    Next follow-up appointment is in the next 6 months or earlier if needed.    Total Time: 21 minutes spent on the date of the encounter doing chart review, history and exam, documentation and further activities per the note.    Gilson Casarez MD  [facility]  (Chart documentation was completed in part with Dragon voice-recognition software. Even though reviewed, some grammatical, spelling, and word errors may remain.)      Again, thank you for allowing me to participate in the care of your patient.        Sincerely,        Gilson PEREIRA  MD [place]

## 2024-06-10 NOTE — PATIENT INSTRUCTIONS
AFTER VISIT SUMMARY (AVS):    At today's visit we thoroughly discussed current symptoms, evaluation results, diagnosis, available treatment options, and the plan.    We decided to continue B12 without changes.  Please check B12/MMA level 2 months after starting the replacement.    Next follow-up appointment is in the next 6 months or earlier if needed.    Please do not hesitate to call me with any questions or concerns.    Thanks.

## 2024-06-10 NOTE — PROGRESS NOTES
ESTABLISHED PATIENT NEUROLOGY NOTE    DATE OF VISIT: 6/10/2024  CLINIC LOCATION: [facility]  MRN: [id number]  PATIENT NAME: Cami Turner  YOB: 1945    REASON FOR VISIT:   Chief Complaint   Patient presents with    Follow Up     Follow up memory concerns  Review neuropsych testing     SUBJECTIVE:                                                      HISTORY OF PRESENT ILLNESS: Patient is here to follow up regarding mild cognitive impairment.  The last visit was on 2023.  Please refer to my initial/other prior notes for further information. Accompanied by caregiver.    Since the last visit, the patient reports that her memory is stable.  She is on B12 placement since 2024 per caregiver estimate (the patient is unsure).  She denies interval development of new neurological symptoms.    Laboratory evaluation from 2024 demonstrated normal TSH (3.13), vitamin B1 (141), B6 (36.9), and folate along with elevated methylmalonic acid (1.23), and low vitamin B12 (less than 150).  I advised her to initiate B12 replacement following that with repeat testing in 2 months after that.  This was not done.  EXAM:                                                    Physical Exam:   Vitals: BP (!) 140/83 (BP Location: Right arm, Patient Position: Supine, Cuff Size: Adult Regular)   Pulse 73   LMP  (LMP Unknown)   SpO2 98%   [place] Cognitive Assessment:    [place] Cognitive Assessment (MOCA)  Visuospatial/Executive : 1  Namin  Attention - Digits: 2  Attention - Letters: 1  Attention - Subtraction: 3  Language - Repeat: 1  Language - Fluency : 0  Abstraction: 2  Delayed Recall: 0  Orientation: 6  Education: 0  MOCA Score: 18  Administered by: : Felicita Berg     [place] Cognitive Assessment Score:  MOCA Score: 18/30.   General: pt is in NAD, cooperative.  Skin: normal turgor, moist mucous membranes, no lesions/rashes noticed.  HEENT: ATNC, white sclera, normal  conjunctiva.  Respiratory: Symmetric lung excursion, no accessory respiratory muscle use.  Abdomen: Non distended.  Neurological: awake, cooperative, follows commands, no aphasia or dysarthria noted, face is symmetric, equally moves all extremities, no dysmetria bilaterally.  ASSESSMENT AND PLAN:                                                    Assessment: 78 year old female patient presents for follow-up of mild cognitive impairment after completion of laboratory workup that demonstrated vitamin B12 deficiency.  She was advised to start on replacement, but did not recheck her blood work following that.  I will add MMA level.  She reports that her memory is stable.  MoCA today is 18/30, compared to 20/30 in June 2023.    Diagnoses:    ICD-10-CM    1. Mild cognitive impairment  G31.84       2. Vitamin B12 deficiency (non anemic)  E53.8         Plan: At today's visit we thoroughly discussed current symptoms, evaluation results, diagnosis, available treatment options, and the plan.    We decided to continue B12 without changes.  I asked the patient to check B12/MMA levels 2 months after starting the replacement.    Next follow-up appointment is in the next 6 months or earlier if needed.    Total Time: 21 minutes spent on the date of the encounter doing chart review, history and exam, documentation and further activities per the note.    Gilson Casarez MD  [facility]  (Chart documentation was completed in part with Dragon voice-recognition software. Even though reviewed, some grammatical, spelling, and word errors may remain.)

## 2024-07-03 ENCOUNTER — MEDICAL CORRESPONDENCE (OUTPATIENT)
Dept: HEALTH INFORMATION MANAGEMENT | Facility: CLINIC | Age: 79
End: 2024-07-03

## 2024-07-16 ENCOUNTER — OFFICE VISIT (OUTPATIENT)
Dept: FAMILY MEDICINE | Facility: CLINIC | Age: 79
End: 2024-07-16
Payer: MEDICARE

## 2024-07-16 VITALS
HEIGHT: 61 IN | OXYGEN SATURATION: 95 % | WEIGHT: 188.3 LBS | TEMPERATURE: 98.3 F | HEART RATE: 93 BPM | RESPIRATION RATE: 16 BRPM | BODY MASS INDEX: 35.55 KG/M2 | DIASTOLIC BLOOD PRESSURE: 70 MMHG | SYSTOLIC BLOOD PRESSURE: 122 MMHG

## 2024-07-16 DIAGNOSIS — Z13.21 ENCOUNTER FOR VITAMIN DEFICIENCY SCREENING: ICD-10-CM

## 2024-07-16 DIAGNOSIS — R53.81 PHYSICAL DECONDITIONING: ICD-10-CM

## 2024-07-16 DIAGNOSIS — Z13.1 SCREENING FOR DIABETES MELLITUS: ICD-10-CM

## 2024-07-16 DIAGNOSIS — Z13.0 SCREENING FOR DEFICIENCY ANEMIA: ICD-10-CM

## 2024-07-16 DIAGNOSIS — R29.6 MULTIPLE FALLS: ICD-10-CM

## 2024-07-16 DIAGNOSIS — Z13.29 THYROID DISORDER SCREENING: ICD-10-CM

## 2024-07-16 DIAGNOSIS — E53.8 VITAMIN B12 DEFICIENCY (NON ANEMIC): ICD-10-CM

## 2024-07-16 DIAGNOSIS — I10 BENIGN ESSENTIAL HYPERTENSION: ICD-10-CM

## 2024-07-16 DIAGNOSIS — E03.9 HYPOTHYROIDISM, UNSPECIFIED TYPE: ICD-10-CM

## 2024-07-16 DIAGNOSIS — Z13.220 SCREENING CHOLESTEROL LEVEL: ICD-10-CM

## 2024-07-16 DIAGNOSIS — I10 ESSENTIAL HYPERTENSION: ICD-10-CM

## 2024-07-16 DIAGNOSIS — R01.1 HEART MURMUR: ICD-10-CM

## 2024-07-16 DIAGNOSIS — Z00.00 ENCOUNTER FOR MEDICARE ANNUAL WELLNESS EXAM: Primary | ICD-10-CM

## 2024-07-16 LAB
ALBUMIN SERPL BCG-MCNC: 4.4 G/DL (ref 3.5–5.2)
ALP SERPL-CCNC: 79 U/L (ref 40–150)
ALT SERPL W P-5'-P-CCNC: 18 U/L (ref 0–50)
ANION GAP SERPL CALCULATED.3IONS-SCNC: 8 MMOL/L (ref 7–15)
AST SERPL W P-5'-P-CCNC: 22 U/L (ref 0–45)
BILIRUB SERPL-MCNC: 0.3 MG/DL
BUN SERPL-MCNC: 20.2 MG/DL (ref 8–23)
CALCIUM SERPL-MCNC: 9.4 MG/DL (ref 8.8–10.4)
CHLORIDE SERPL-SCNC: 102 MMOL/L (ref 98–107)
CHOLEST SERPL-MCNC: 213 MG/DL
CREAT SERPL-MCNC: 1.04 MG/DL (ref 0.51–0.95)
EGFRCR SERPLBLD CKD-EPI 2021: 54 ML/MIN/1.73M2
ERYTHROCYTE [DISTWIDTH] IN BLOOD BY AUTOMATED COUNT: 13.7 % (ref 10–15)
FASTING STATUS PATIENT QL REPORTED: YES
FASTING STATUS PATIENT QL REPORTED: YES
GLUCOSE SERPL-MCNC: 95 MG/DL (ref 70–99)
HCO3 SERPL-SCNC: 28 MMOL/L (ref 22–29)
HCT VFR BLD AUTO: 39.5 % (ref 35–47)
HDLC SERPL-MCNC: 89 MG/DL
HGB BLD-MCNC: 12.8 G/DL (ref 11.7–15.7)
LDLC SERPL CALC-MCNC: 106 MG/DL
MCH RBC QN AUTO: 30.7 PG (ref 26.5–33)
MCHC RBC AUTO-ENTMCNC: 32.4 G/DL (ref 31.5–36.5)
MCV RBC AUTO: 95 FL (ref 78–100)
NONHDLC SERPL-MCNC: 124 MG/DL
PLATELET # BLD AUTO: 242 10E3/UL (ref 150–450)
POTASSIUM SERPL-SCNC: 5.2 MMOL/L (ref 3.4–5.3)
PROT SERPL-MCNC: 7 G/DL (ref 6.4–8.3)
RBC # BLD AUTO: 4.17 10E6/UL (ref 3.8–5.2)
SODIUM SERPL-SCNC: 138 MMOL/L (ref 135–145)
T4 FREE SERPL-MCNC: 1.07 NG/DL (ref 0.9–1.7)
TRIGL SERPL-MCNC: 92 MG/DL
TSH SERPL DL<=0.005 MIU/L-ACNC: 6.67 UIU/ML (ref 0.3–4.2)
VIT D+METAB SERPL-MCNC: 76 NG/ML (ref 20–50)
WBC # BLD AUTO: 5 10E3/UL (ref 4–11)

## 2024-07-16 PROCEDURE — 82306 VITAMIN D 25 HYDROXY: CPT | Mod: GA | Performed by: NURSE PRACTITIONER

## 2024-07-16 PROCEDURE — 85027 COMPLETE CBC AUTOMATED: CPT | Performed by: NURSE PRACTITIONER

## 2024-07-16 PROCEDURE — 80053 COMPREHEN METABOLIC PANEL: CPT | Performed by: NURSE PRACTITIONER

## 2024-07-16 PROCEDURE — 36415 COLL VENOUS BLD VENIPUNCTURE: CPT | Performed by: NURSE PRACTITIONER

## 2024-07-16 PROCEDURE — 84443 ASSAY THYROID STIM HORMONE: CPT | Performed by: NURSE PRACTITIONER

## 2024-07-16 PROCEDURE — 99213 OFFICE O/P EST LOW 20 MIN: CPT | Mod: 25 | Performed by: NURSE PRACTITIONER

## 2024-07-16 PROCEDURE — 84439 ASSAY OF FREE THYROXINE: CPT | Performed by: NURSE PRACTITIONER

## 2024-07-16 PROCEDURE — 80061 LIPID PANEL: CPT | Performed by: NURSE PRACTITIONER

## 2024-07-16 PROCEDURE — G0439 PPPS, SUBSEQ VISIT: HCPCS | Performed by: NURSE PRACTITIONER

## 2024-07-16 SDOH — HEALTH STABILITY: PHYSICAL HEALTH: ON AVERAGE, HOW MANY DAYS PER WEEK DO YOU ENGAGE IN MODERATE TO STRENUOUS EXERCISE (LIKE A BRISK WALK)?: 1 DAY

## 2024-07-16 ASSESSMENT — ANXIETY QUESTIONNAIRES
4. TROUBLE RELAXING: NOT AT ALL
GAD7 TOTAL SCORE: 0
1. FEELING NERVOUS, ANXIOUS, OR ON EDGE: NOT AT ALL
8. IF YOU CHECKED OFF ANY PROBLEMS, HOW DIFFICULT HAVE THESE MADE IT FOR YOU TO DO YOUR WORK, TAKE CARE OF THINGS AT HOME, OR GET ALONG WITH OTHER PEOPLE?: NOT DIFFICULT AT ALL
6. BECOMING EASILY ANNOYED OR IRRITABLE: NOT AT ALL
IF YOU CHECKED OFF ANY PROBLEMS ON THIS QUESTIONNAIRE, HOW DIFFICULT HAVE THESE PROBLEMS MADE IT FOR YOU TO DO YOUR WORK, TAKE CARE OF THINGS AT HOME, OR GET ALONG WITH OTHER PEOPLE: NOT DIFFICULT AT ALL
7. FEELING AFRAID AS IF SOMETHING AWFUL MIGHT HAPPEN: NOT AT ALL
GAD7 TOTAL SCORE: 0
3. WORRYING TOO MUCH ABOUT DIFFERENT THINGS: NOT AT ALL
2. NOT BEING ABLE TO STOP OR CONTROL WORRYING: NOT AT ALL
5. BEING SO RESTLESS THAT IT IS HARD TO SIT STILL: NOT AT ALL
7. FEELING AFRAID AS IF SOMETHING AWFUL MIGHT HAPPEN: NOT AT ALL
GAD7 TOTAL SCORE: 0

## 2024-07-16 ASSESSMENT — ASTHMA QUESTIONNAIRES: ACT_TOTALSCORE: 23

## 2024-07-16 ASSESSMENT — SOCIAL DETERMINANTS OF HEALTH (SDOH): HOW OFTEN DO YOU GET TOGETHER WITH FRIENDS OR RELATIVES?: ONCE A WEEK

## 2024-07-16 NOTE — PATIENT INSTRUCTIONS
Patient Education   Preventive Care Advice   This is general advice given by our system to help you stay healthy. However, your care team may have specific advice just for you. Please talk to your care team about your preventive care needs.  Nutrition  Eat 5 or more servings of fruits and vegetables each day.  Try wheat bread, brown rice and whole grain pasta (instead of white bread, rice, and pasta).  Get enough calcium and vitamin D. Check the label on foods and aim for 100% of the RDA (recommended daily allowance).  Lifestyle  Exercise at least 150 minutes each week  (30 minutes a day, 5 days a week).  Do muscle strengthening activities 2 days a week. These help control your weight and prevent disease.  No smoking.  Wear sunscreen to prevent skin cancer.  Have a dental exam and cleaning every 6 months.  Yearly exams  See your health care team every year to talk about:  Any changes in your health.  Any medicines your care team has prescribed.  Preventive care, family planning, and ways to prevent chronic diseases.  Shots (vaccines)   HPV shots (up to age 26), if you've never had them before.  Hepatitis B shots (up to age 59), if you've never had them before.  COVID-19 shot: Get this shot when it's due.  Flu shot: Get a flu shot every year.  Tetanus shot: Get a tetanus shot every 10 years.  Pneumococcal, hepatitis A, and RSV shots: Ask your care team if you need these based on your risk.  Shingles shot (for age 50 and up)  General health tests  Diabetes screening:  Starting at age 35, Get screened for diabetes at least every 3 years.  If you are younger than age 35, ask your care team if you should be screened for diabetes.  Cholesterol test: At age 39, start having a cholesterol test every 5 years, or more often if advised.  Bone density scan (DEXA): At age 50, ask your care team if you should have this scan for osteoporosis (brittle bones).  Hepatitis C: Get tested at least once in your life.  STIs (sexually  transmitted infections)  Before age 24: Ask your care team if you should be screened for STIs.  After age 24: Get screened for STIs if you're at risk. You are at risk for STIs (including HIV) if:  You are sexually active with more than one person.  You don't use condoms every time.  You or a partner was diagnosed with a sexually transmitted infection.  If you are at risk for HIV, ask about PrEP medicine to prevent HIV.  Get tested for HIV at least once in your life, whether you are at risk for HIV or not.  Cancer screening tests  Cervical cancer screening: If you have a cervix, begin getting regular cervical cancer screening tests starting at age 21.  Breast cancer scan (mammogram): If you've ever had breasts, begin having regular mammograms starting at age 40. This is a scan to check for breast cancer.  Colon cancer screening: It is important to start screening for colon cancer at age 45.  Have a colonoscopy test every 10 years (or more often if you're at risk) Or, ask your provider about stool tests like a FIT test every year or Cologuard test every 3 years.  To learn more about your testing options, visit:   .  For help making a decision, visit:   https://bit.ly/qf67068.  Prostate cancer screening test: If you have a prostate, ask your care team if a prostate cancer screening test (PSA) at age 55 is right for you.  Lung cancer screening: If you are a current or former smoker ages 50 to 80, ask your care team if ongoing lung cancer screenings are right for you.  For informational purposes only. Not to replace the advice of your health care provider. Copyright   2023 [employer]. All rights reserved. Clinically reviewed by the [facility]. [employer] [id number] - REV 01/24.

## 2024-07-16 NOTE — LETTER
My Depression Action Plan  Name: Cami Turner   Date of Birth 1945  Date: 7/16/2024    My doctor: Tisha Trevino   My clinic: [facility] [address] [address] [phone]            GREEN    ZONE   Good Control    What it looks like:   Things are going generally well. You have normal ups and downs. You may even feel depressed from time to time, but bad moods usually last less than a day.   What you need to do:  Continue to care for yourself (see self care plan)  Check your depression survival kit and update it as needed  Follow your physician s recommendations including any medication.  Do not stop taking medication unless you consult with your physician first.             YELLOW         ZONE Getting Worse    What it looks like:   Depression is starting to interfere with your life.   It may be hard to get out of bed; you may be starting to isolate yourself from others.  Symptoms of depression are starting to last most all day and this has happened for several days.   You may have suicidal thoughts but they are not constant.   What you need to do:     Call your care team. Your response to treatment will improve if you keep your care team informed of your progress. Yellow periods are signs an adjustment may need to be made.     Continue your self-care.  Just get dressed and ready for the day.  Don't give yourself time to talk yourself out of it.    Talk to someone in your support network.    Open up your Depression Self-Care Plan/Wellness Kit.             RED    ZONE Medical Alert - Get Help    What it looks like:   Depression is seriously interfering with your life.   You may experience these or other symptoms: You can t get out of bed most days, can t work or engage in other necessary activities, you have trouble taking care of basic hygiene, or basic responsibilities, thoughts of suicide or death that will not go away,  self-injurious behavior.     What you need to do:  Call your care team and request a same-day appointment. If they are not available (weekends or after hours) call your local crisis line, emergency room or 911.          Depression Self-Care Plan / Wellness Kit    Many people find that medication and therapy are helpful treatments for managing depression. In addition, making small changes to your everyday life can help to boost your mood and improve your wellbeing. Below are some tips for you to consider. Be sure to talk with your medical provider and/or behavioral health consultant if your symptoms are worsening or not improving.     Sleep   Sleep hygiene  means all of the habits that support good, restful sleep. It includes maintaining a consistent bedtime and wake time, using your bedroom only for sleeping or sex, and keeping the bedroom dark and free of distractions like a computer, smartphone, or television.     Develop a Healthy Routine  Maintain good hygiene. Get out of bed in the morning, make your bed, brush your teeth, take a shower, and get dressed. Don t spend too much time viewing media that makes you feel stressed. Find time to relax each day.    Exercise  Get some form of exercise every day. This will help reduce pain and release endorphins, the  feel good  chemicals in your brain. It can be as simple as just going for a walk or doing some gardening, anything that will get you moving.      Diet  Strive to eat healthy foods, including fruits and vegetables. Drink plenty of water. Avoid excessive sugar, caffeine, alcohol, and other mood-altering substances.     Stay Connected with Others  Stay in touch with friends and family members.    Manage Your Mood  Try deep breathing, massage therapy, biofeedback, or meditation. Take part in fun activities when you can. Try to find something to smile about each day.     Psychotherapy  Be open to working with a therapist if your provider recommends it.      Medication  Be sure to take your medication as prescribed. Most anti-depressants need to be taken every day. It usually takes several weeks for medications to work. Not all medicines work for all people. It is important to follow-up with your provider to make sure you have a treatment plan that is working for you. Do not stop your medication abruptly without first discussing it with your provider.    Crisis Resources   These hotlines are for both adults and children. They and are open 24 hours a day, 7 days a week unless noted otherwise.    National Suicide Prevention Lifeline   988 or 1-116-998-SVWE (6278)    Crisis Text Line    www.crisistextline.org  Text HOME to 637572 from anywhere in the [place], anytime, about any type of crisis. A live, trained crisis counselor will receive the text and respond quickly.    [place] Lifeline for LGBTQ Youth  A national crisis intervention and suicide lifeline for LGBTQ youth under 25. Provides a safe place to talk without judgement. Call 1-189.472.2676; text START to 866103 or visit www.thetrevorproject.org to talk to a trained counselor.    For [facility] crisis numbers, visit the [facility] website at:  https://[place].gov/dhs/people-we-serve/adults/health-care/mental-health/resources/crisis-contacts.jsp

## 2024-07-16 NOTE — PROGRESS NOTES
Preventive Care Visit  [facility] [place]  Tisha Trevino CNP, Nurse Practitioner - Family  Jul 16, 2024      Assessment & Plan     Encounter for Medicare annual wellness exam    Essential hypertension  - Comprehensive metabolic panel (BMP + Alb, Alk Phos, ALT, AST, Total. Bili, TP)  - Comprehensive metabolic panel (BMP + Alb, Alk Phos, ALT, AST, Total. Bili, TP)    Vitamin B12 deficiency (non anemic)  - Methylmalonic Acid  - Vitamin B12    Heart murmur  More prominent sounding and some fatigue. Update and depending on changes may have see cardiology.  - Echocardiogram Complete    Benign essential hypertension  - Comprehensive metabolic panel (BMP + Alb, Alk Phos, ALT, AST, Total. Bili, TP)  - Comprehensive metabolic panel (BMP + Alb, Alk Phos, ALT, AST, Total. Bili, TP)    Multiple falls  Multiple falls historically and physical deconditioning. Recommend home PT for her.   - Home Care Referral    Physical deconditioning  - Home Care Referral    Screening cholesterol level  - Lipid panel reflex to direct LDL Fasting  - Lipid panel reflex to direct LDL Fasting    Screening for deficiency anemia  - CBC with platelets  - CBC with platelets    Screening for diabetes mellitus  - Comprehensive metabolic panel (BMP + Alb, Alk Phos, ALT, AST, Total. Bili, TP)  - Comprehensive metabolic panel (BMP + Alb, Alk Phos, ALT, AST, Total. Bili, TP)    Thyroid disorder screening  - TSH with free T4 reflex  - TSH with free T4 reflex    Encounter for vitamin deficiency screening  - Vitamin D Deficiency  - Vitamin D Deficiency    Body mass index (BMI) 35.0-35.9, adult  - Vitamin D Deficiency  - Vitamin D Deficiency      Patient has been advised of split billing requirements and indicates understanding: Yes        Counseling  Appropriate preventive services were discussed with this patient, including applicable screening as appropriate for fall prevention, nutrition, physical activity, Tobacco-use cessation, weight loss  and cognition.  Checklist reviewing preventive services available has been given to the patient.  Reviewed patient's diet, addressing concerns and/or questions.   She is at risk for lack of exercise and has been provided with information to increase physical activity for the benefit of her well-being.       See Patient Instructions    Subjective   Cami is a 79 year old, presenting for the following:  Physical (fasting)        7/16/2024     9:54 AM   Additional Questions   Roomed by Brittany   Accompanied by daughter         Health Care Directive  Patient has a Health Care Directive on file  Advance care planning document is on file and is current.    HPI      Hyperlipidemia Follow-Up    Are you regularly taking any medication or supplement to lower your cholesterol?   Yes-    Are you having muscle aches or other side effects that you think could be caused by your cholesterol lowering medication?  No    Hypertension Follow-up    Do you check your blood pressure regularly outside of the clinic? No   Are you following a low salt diet? Yes  Are your blood pressures ever more than 140 on the top number (systolic) OR more   than 90 on the bottom number (diastolic), for example 140/90? N/A    Anxiety   How are you doing with your anxiety since your last visit? stable  Are you having other symptoms that might be associated with anxiety? No  Have you had a significant life event? No   Are you feeling depressed? No  Do you have any concerns with your use of alcohol or other drugs? No    Social History     Tobacco Use    Smoking status: Never    Smokeless tobacco: Never   Vaping Use    Vaping status: Never Used   Substance Use Topics    Alcohol use: Yes     Comment: 3 drinks per week    Drug use: Never         7/14/2023     9:06 AM 2/5/2024    12:03 PM 7/16/2024     9:53 AM   TYLER-7 SCORE   Total Score 5 (mild anxiety)  0 (minimal anxiety)   Total Score 5 0 0         7/14/2023     9:04 AM 2/5/2024    11:00 AM 7/16/2024     9:52 AM    PHQ   PHQ-9 Total Score 2 0 0   Q9: Thoughts of better off dead/self-harm past 2 weeks Not at all Not at all Not at all         7/16/2024     9:52 AM   Last PHQ-9   1.  Little interest or pleasure in doing things 0   2.  Feeling down, depressed, or hopeless 0   3.  Trouble falling or staying asleep, or sleeping too much 0   4.  Feeling tired or having little energy 0   5.  Poor appetite or overeating 0   6.  Feeling bad about yourself 0   7.  Trouble concentrating 0   8.  Moving slowly or restless 0   Q9: Thoughts of better off dead/self-harm past 2 weeks 0   PHQ-9 Total Score 0         7/16/2024     9:53 AM   TYLER-7    1. Feeling nervous, anxious, or on edge 0   2. Not being able to stop or control worrying 0   3. Worrying too much about different things 0   4. Trouble relaxing 0   5. Being so restless that it is hard to sit still 0   6. Becoming easily annoyed or irritable 0   7. Feeling afraid, as if something awful might happen 0   TYLER-7 Total Score 0   If you checked any problems, how difficult have they made it for you to do your work, take care of things at home, or get along with other people? Not difficult at all           7/16/2024   General Health   How would you rate your overall physical health? Good   Feel stress (tense, anxious, or unable to sleep) Not at all            7/16/2024   Nutrition   Diet: Regular (no restrictions)            7/16/2024   Exercise   Days per week of moderate/strenous exercise 1 day      (!) EXERCISE CONCERN      7/16/2024   Social Factors   Frequency of gathering with friends or relatives Once a week   Worry food won't last until get money to buy more No   Food not last or not have enough money for food? No   Do you have housing? (Housing is defined as stable permanent housing and does not include staying ouside in a car, in a tent, in an abandoned building, in an overnight shelter, or couch-surfing.) Yes   Are you worried about losing your housing? No   Lack of  transportation? No   Unable to get utilities (heat,electricity)? No            7/16/2024   Fall Risk   Fallen 2 or more times in the past year? No    No   Trouble with walking or balance? No    No       Multiple values from one day are sorted in reverse-chronological order          7/16/2024   Activities of Daily Living- Home Safety   Needs help with the following daily activites None of the above   Safety concerns in the home None of the above            7/16/2024   Dental   Dentist two times every year? Yes            7/16/2024   Hearing Screening   Hearing concerns? None of the above            7/16/2024   Driving Risk Screening   Patient/family members have concerns about driving No            7/16/2024   General Alertness/Fatigue Screening   Have you been more tired than usual lately? No            7/16/2024   Urinary Incontinence Screening   Bothered by leaking urine in past 6 months No             Today's PHQ-9 Score:       7/16/2024     9:52 AM   PHQ-9 SCORE   PHQ-9 Total Score MyChart 0   PHQ-9 Total Score 0         7/16/2024   Substance Use   Alcohol more than 3/day or more than 7/wk No   Do you have a current opioid prescription? No   How severe/bad is pain from 1 to 10? 0/10 (No Pain)   Do you use any other substances recreationally? No        Social History     Tobacco Use    Smoking status: Never    Smokeless tobacco: Never   Vaping Use    Vaping status: Never Used   Substance Use Topics    Alcohol use: Yes     Comment: 3 drinks per week    Drug use: Never          Mammogram Screening - Mammography discussed and declined              Reviewed and updated as needed this visit by Provider                    Past Medical History:   Diagnosis Date    Morbid obesity (H) 07/06/2020     Past Surgical History:   Procedure Laterality Date    ANKLE SURGERY Left     HIP SURGERY Right      Current providers sharing in care for this patient include:  Patient Care Team:  Tisha Trevino CNP as PCP - General  "(Nurse Practitioner - Family)  Tisha Trevino, CNP as Assigned PCP  [facility], [facility]  Gilson Casarez MD as Assigned Neuroscience Provider  Venecia Dutta, PhD as Assigned Behavioral Health Provider  Jackie Castillo PA-C as Assigned Cancer Care Provider    The following health maintenance items are reviewed in Epic and correct as of today:  Health Maintenance   Topic Date Due    DTAP/TDAP/TD IMMUNIZATION (1 - Tdap) Never done    ZOSTER IMMUNIZATION (1 of 2) Never done    INFLUENZA VACCINE (1) 09/01/2024    ASTHMA CONTROL TEST  01/16/2025    PHQ-9  01/16/2025    LIPID  02/05/2025    MEDICARE ANNUAL WELLNESS VISIT  07/16/2025    ANNUAL REVIEW OF HM ORDERS  07/16/2025    ASTHMA ACTION PLAN  07/16/2025    FALL RISK ASSESSMENT  07/16/2025    ADVANCE CARE PLANNING  07/16/2029    DEPRESSION ACTION PLAN  Completed    Pneumococcal Vaccine: 65+ Years  Completed    RSV VACCINE (Pregnancy & 60+)  Completed    IPV IMMUNIZATION  Aged Out    HPV IMMUNIZATION  Aged Out    MENINGITIS IMMUNIZATION  Aged Out    RSV MONOCLONAL ANTIBODY  Aged Out    DEXA  Discontinued    COVID-19 Vaccine  Discontinued         Review of Systems  Constitutional, HEENT, cardiovascular, pulmonary, GI, , musculoskeletal, neuro, skin, endocrine and psych systems are negative, except as otherwise noted.     Objective    Exam  /70   Pulse 93   Temp 98.3  F (36.8  C) (Oral)   Resp 16   Ht 1.549 m (5' 1\")   Wt 85.4 kg (188 lb 4.8 oz)   LMP  (LMP Unknown)   SpO2 95%   BMI 35.58 kg/m     Estimated body mass index is 35.58 kg/m  as calculated from the following:    Height as of this encounter: 1.549 m (5' 1\").    Weight as of this encounter: 85.4 kg (188 lb 4.8 oz).    Physical Exam  GENERAL: alert and no distress  EYES: Eyes grossly normal to inspection, PERRL and conjunctivae and sclerae normal  HENT: ear canals and TM's normal, nose and mouth without ulcers or lesions  NECK: no adenopathy, no " asymmetry, masses, or scars  RESP: lungs clear to auscultation - no rales, rhonchi or wheezes  CV: regular rate and rhythm, normal S1 S2, no S3 or S4, no murmur, click or rub, no peripheral edema  ABDOMEN: soft, nontender, no hepatosplenomegaly, no masses and bowel sounds normal  MS: no gross musculoskeletal defects noted, no edema  SKIN: no suspicious lesions or rashes  NEURO: Normal strength and tone, mentation intact and speech normal  PSYCH: mentation appears normal, affect normal/bright        7/16/2024   Mini Cog   Clock Draw Score 2 Normal   3 Item Recall 2 objects recalled   Mini Cog Total Score 4                 Signed Electronically by: Tisha Trevino, CNP

## 2024-07-16 NOTE — LETTER
My Asthma Action Plan    Name: Cami Turner   YOB: 1945  Date: 7/16/2024   My doctor: Tisha Trevino CNP   My clinic: [facility] [place]        My Rescue Medicine:   Albuterol inhaler (Proair/Ventolin/Proventil HFA)  2-4 puffs EVERY 4 HOURS as needed. Use a spacer if recommended by your provider.   My Asthma Severity:   Intermittent / Exercise Induced  Know your asthma triggers: upper respiratory infections  Dry air when traveling          GREEN ZONE   Good Control  I feel good  No cough or wheeze  Can work, sleep and play without asthma symptoms       Take your asthma control medicine every day.     If exercise triggers your asthma, take your rescue medication  15 minutes before exercise or sports, and  During exercise if you have asthma symptoms  Spacer to use with inhaler: If you have a spacer, make sure to use it with your inhaler             YELLOW ZONE Getting Worse  I have ANY of these:  I do not feel good  Cough or wheeze  Chest feels tight  Wake up at night   Keep taking your Green Zone medications  Start taking your rescue medicine:  every 20 minutes for up to 1 hour. Then every 4 hours for 24-48 hours.  If you stay in the Yellow Zone for more than 12-24 hours, contact your doctor.  If you do not return to the Green Zone in 12-24 hours or you get worse, start taking your oral steroid medicine if prescribed by your provider.           RED ZONE Medical Alert - Get Help  I have ANY of these:  I feel awful  Medicine is not helping  Breathing getting harder  Trouble walking or talking  Nose opens wide to breathe       Take your rescue medicine NOW  If your provider has prescribed an oral steroid medicine, start taking it NOW  Call your doctor NOW  If you are still in the Red Zone after 20 minutes and you have not reached your doctor:  Take your rescue medicine again and  Call 911 or go to the emergency room right away    See your regular doctor within 2 weeks of an Emergency Room  or Urgent Care visit for follow-up treatment.          Annual Reminders:  Meet with Asthma Educator,  Flu Shot in the Fall, consider Pneumonia Vaccination for patients with asthma (aged 19 and older).    Pharmacy[facility]    [facility] [address]  [address], [place] [address] [facility] [address] [facility] [employer] - [facility] [address] [address] [facility] [place] - [address] PHARMACY MAIL DELIVERY (NOW [facility] DEL[facility]ERY) - [facility] [address]  [facility] - [place], [place] - [facility]    Electronically signed by Tisha Trevino CNP   Date: 07/16/24                    Asthma Triggers  How To Control Things That Make Your Asthma Worse    Triggers are things that make your asthma worse.  Look at the list below to help you find your triggers and   what you can do about them. You can help prevent asthma flare-ups by staying away from your triggers.      Trigger                                                          What you can do   Cigarette Smoke  Tobacco smoke can make asthma worse. Do not allow smoking in your home, car or around you.  Be sure no one smokes at a child s day care or school.  If you smoke, ask your health care provider for ways to help you quit.  Ask family members to quit too.  Ask your health care provider for a referral to Quit Plan to help you quit smoking, or call 8-312-891-PLAN.     Colds, Flu, Bronchitis  These are common triggers of asthma. Wash your hands often.  Don t touch your eyes, nose or mouth.  Get a flu shot every year.     Dust Mites  These are tiny bugs that live in cloth or carpet. They are too small to see. Wash sheets and blankets in hot water every week.   Encase pillows and mattress in dust mite proof covers.  Avoid having carpet if you can. If you have carpet, vacuum weekly.   Use a dust mask and HEPA  vacuum.   Pollen and Outdoor Mold  Some people are allergic to trees, grass, or weed pollen, or molds. Try to keep your windows closed.  Limit time out doors when pollen count is high.   Ask you health care provider about taking medicine during allergy season.     Animal Dander  Some people are allergic to skin flakes, urine or saliva from pets with fur or feathers. Keep pets with fur or feathers out of your home.    If you can t keep the pet outdoors, then keep the pet out of your bedroom.  Keep the bedroom door closed.  Keep pets off cloth furniture and away from stuffed toys.     Mice, Rats, and Cockroaches  Some people are allergic to the waste from these pests.   Cover food and garbage.  Clean up spills and food crumbs.  Store grease in the refrigerator.   Keep food out of the bedroom.   Indoor Mold  This can be a trigger if your home has high moisture. Fix leaking faucets, pipes, or other sources of water.   Clean moldy surfaces.  Dehumidify basement if it is damp and smelly.   Smoke, Strong Odors, and Sprays  These can reduce air quality. Stay away from strong odors and sprays, such as perfume, powder, hair spray, paints, smoke incense, paint, cleaning products, candles and new carpet.   Exercise or Sports  Some people with asthma have this trigger. Be active!  Ask your doctor about taking medicine before sports or exercise to prevent symptoms.    Warm up for 5-10 minutes before and after sports or exercise.     Other Triggers of Asthma  Cold air:  Cover your nose and mouth with a scarf.  Sometimes laughing or crying can be a trigger.  Some medicines and food can trigger asthma.

## 2024-07-22 RX ORDER — LEVOTHYROXINE SODIUM 25 UG/1
25 TABLET ORAL DAILY
Qty: 90 TABLET | Refills: 3 | Status: SHIPPED | OUTPATIENT
Start: 2024-07-22

## 2024-07-23 ENCOUNTER — TELEPHONE (OUTPATIENT)
Dept: FAMILY MEDICINE | Facility: CLINIC | Age: 79
End: 2024-07-23
Payer: MEDICARE

## 2024-07-23 NOTE — TELEPHONE ENCOUNTER
Home Care is calling regarding an established patient with [facility].    Requesting orders from: Tisha Trevino  Provider is following patient: Yes  Is this a 60-day recertification request?  No    Orders Requested    Physical Therapy  Request for delay in care, service is not able to be provided within same scheduled day.   Delay in start of care until tomorrow 7/24    Gave verbal ok    Verbal orders given.  Home Care will send orders for provider to sign.    MORA CAMERON RN

## 2024-07-24 ENCOUNTER — MEDICAL CORRESPONDENCE (OUTPATIENT)
Dept: HEALTH INFORMATION MANAGEMENT | Facility: CLINIC | Age: 79
End: 2024-07-24

## 2024-07-25 ENCOUNTER — TELEPHONE (OUTPATIENT)
Dept: FAMILY MEDICINE | Facility: CLINIC | Age: 79
End: 2024-07-25
Payer: MEDICARE

## 2024-07-25 NOTE — TELEPHONE ENCOUNTER
Order/Referral Request    Who is requesting: Jerry with [facility] calling for VERBAL ORDERS     Orders being requested: PT 1wk2 everyother for 6 wks, OT eval and treat    Reason service is needed/diagnosis: Fall, history and heart health    When are orders needed by: asap    Has this been discussed with Provider: No    Does patient have a preference on a Group/Provider/Facility? above    Does patient have an appointment scheduled?: No    Where to send orders: N/A    Could we send this information to you in [facility] or would you prefer to receive a phone call?:   Jerry at [facility] #[phone]    Sindhu K

## 2024-07-31 ENCOUNTER — TELEPHONE (OUTPATIENT)
Dept: FAMILY MEDICINE | Facility: CLINIC | Age: 79
End: 2024-07-31
Payer: MEDICARE

## 2024-07-31 NOTE — TELEPHONE ENCOUNTER
Home Health Care      Reason for call:  [facility] - CLIENT COORDINATION NOTE REPORT FOCUS CARE STRENGTH, FALL PREVENTION, CARIDAC HEALTH    Orders are needed for this patient.  above    Pt Provider: Belinda, A    Phone Number Homecare Nurse can be reached at: Fax       Could we send this information to you in [place] or would you prefer to receive a phone call?:   na    Put in providers in box    Sindhu K

## 2024-08-02 NOTE — TELEPHONE ENCOUNTER
Faxed signed form to [facility] #[phone]    Initial plan of care approval    Copied into [facility] / filed in [place] / Sindhu HENLEY

## 2024-08-07 ENCOUNTER — HOSPITAL ENCOUNTER (OUTPATIENT)
Dept: CARDIOLOGY | Facility: CLINIC | Age: 79
Discharge: HOME | End: 2024-08-07
Attending: NURSE PRACTITIONER | Admitting: NURSE PRACTITIONER
Payer: MEDICARE

## 2024-08-07 DIAGNOSIS — R01.1 HEART MURMUR: ICD-10-CM

## 2024-08-07 LAB — LVEF ECHO: NORMAL

## 2024-08-07 PROCEDURE — 93306 TTE W/DOPPLER COMPLETE: CPT

## 2024-08-07 PROCEDURE — 93306 TTE W/DOPPLER COMPLETE: CPT | Mod: 26 | Performed by: INTERNAL MEDICINE

## 2024-08-23 ENCOUNTER — TELEPHONE (OUTPATIENT)
Dept: FAMILY MEDICINE | Facility: CLINIC | Age: 79
End: 2024-08-23
Payer: MEDICARE

## 2024-08-23 NOTE — TELEPHONE ENCOUNTER
Forms/Letter Request    Type of form/letter: Home Health Certification  Order number [id number]  Do we have the form/letter: Yes: given to A Trevino    Who is the form from? Home care    Where did/will the form come from? form was faxed in    When is form/letter needed by: when available    How would you like the form/letter returned: Fax : [phone]

## 2024-08-26 DIAGNOSIS — Z53.9 DIAGNOSIS NOT YET DEFINED: Primary | ICD-10-CM

## 2024-08-26 PROCEDURE — G0180 MD CERTIFICATION HHA PATIENT: HCPCS | Performed by: NURSE PRACTITIONER

## 2024-08-30 ENCOUNTER — TELEPHONE (OUTPATIENT)
Dept: FAMILY MEDICINE | Facility: CLINIC | Age: 79
End: 2024-08-30
Payer: MEDICARE

## 2024-08-30 NOTE — TELEPHONE ENCOUNTER
Home Care is calling regarding an established patient with [facility].       Requesting orders from: Tisha Trevino  Provider is following patient: Yes  Is this a 60-day recertification request?  No    Orders Requested    Occupational Therapy  Request for continuation of care with no increase or decrease in frequency  Frequency:  adding 2 additional visits for strengthening and home safety.           Verbal orders given.  Home Care will send orders for provider to sign.    MORA CAMERON RN

## 2024-09-03 ENCOUNTER — TELEPHONE (OUTPATIENT)
Dept: FAMILY MEDICINE | Facility: CLINIC | Age: 79
End: 2024-09-03
Payer: MEDICARE

## 2024-09-03 NOTE — TELEPHONE ENCOUNTER
Paperwork signed and faxed. Sent to [facility], Filed in [facility].

## 2024-09-03 NOTE — TELEPHONE ENCOUNTER
Completed forms, placed in [facility] folder to return fax.    Tisha Trevino, CNP

## 2024-09-03 NOTE — TELEPHONE ENCOUNTER
Forms/Letter Request    Type of form/letter: 8/30/24 Add on discipline Occupational therapy to evaluate    Do we have the form/letter: Yes: placed in PCPs basket    Who is the form from? [facility]    Where did/will the form come from? form was faxed in    When is form/letter needed by: 5-7 business days    How would you like the form/letter returned: Fax : [phone]    Patient Notified form requests are processed in 5-7 business days:No    Could we send this information to you in [employer] or would you prefer to receive a phone call?:   No preference   Okay to leave a detailed message?: No

## 2024-10-21 ENCOUNTER — LAB (OUTPATIENT)
Dept: LAB | Facility: CLINIC | Age: 79
End: 2024-10-21
Payer: MEDICARE

## 2024-10-21 DIAGNOSIS — E03.9 HYPOTHYROIDISM, UNSPECIFIED TYPE: ICD-10-CM

## 2024-10-21 LAB
T4 FREE SERPL-MCNC: 1.11 NG/DL (ref 0.9–1.7)
TSH SERPL DL<=0.005 MIU/L-ACNC: 4.88 UIU/ML (ref 0.3–4.2)
VIT B12 SERPL-MCNC: 422 PG/ML (ref 232–1245)

## 2024-10-21 PROCEDURE — 84443 ASSAY THYROID STIM HORMONE: CPT

## 2024-10-21 PROCEDURE — 84439 ASSAY OF FREE THYROXINE: CPT

## 2024-10-24 DIAGNOSIS — E03.9 HYPOTHYROIDISM, UNSPECIFIED TYPE: Primary | ICD-10-CM

## 2024-10-24 LAB — METHYLMALONATE SERPL-SCNC: 0.28 UMOL/L (ref 0–0.4)

## 2024-10-24 RX ORDER — LEVOTHYROXINE SODIUM 50 UG/1
50 TABLET ORAL DAILY
Qty: 90 TABLET | Refills: 3 | Status: SHIPPED | OUTPATIENT
Start: 2024-10-24

## 2024-11-05 ENCOUNTER — VIRTUAL VISIT (OUTPATIENT)
Dept: FAMILY MEDICINE | Facility: CLINIC | Age: 79
End: 2024-11-05
Payer: MEDICARE

## 2024-11-05 DIAGNOSIS — E03.9 HYPOTHYROIDISM, UNSPECIFIED TYPE: Primary | ICD-10-CM

## 2024-11-05 DIAGNOSIS — J45.30 MILD PERSISTENT ASTHMA WITHOUT COMPLICATION: ICD-10-CM

## 2024-11-05 PROCEDURE — 99441 PR PHYSICIAN TELEPHONE EVALUATION 5-10 MIN: CPT | Mod: 93 | Performed by: NURSE PRACTITIONER

## 2024-11-05 NOTE — PROGRESS NOTES
"Cami is a 79 year old who is being evaluated via a billable telephone visit.    What phone number would you like to be contacted at? [phone]  How would you like to obtain your AVS? Rsoa  Originating Location (pt. Location): Home    Distant Location (provider location):  On-site    Assessment & Plan     Hypothyroidism, unspecified type  Discussed dose change and lab recheck in 8 weeks. Feeling well, no symptoms.     Mild persistent asthma without complication  Stable on regimen.            BMI  Estimated body mass index is 35.58 kg/m  as calculated from the following:    Height as of 7/16/24: 1.549 m (5' 1\").    Weight as of 7/16/24: 85.4 kg (188 lb 4.8 oz).   Weight management plan: Discussed healthy diet and exercise guidelines      See Patient Instructions    Subjective   Cami is a 79 year old, presenting for the following health issues:  Recheck Medication      11/5/2024    12:45 PM   Additional Questions   Roomed by Yuliya KHAN   Accompanied by self     HPI     Hyperlipidemia Follow-Up    Are you regularly taking any medication or supplement to lower your cholesterol?   Yes- Lipitor  Are you having muscle aches or other side effects that you think could be caused by your cholesterol lowering medication?  No    Hypertension Follow-up    Do you check your blood pressure regularly outside of the clinic? No   Are you following a low salt diet? No  Are your blood pressures ever more than 140 on the top number (systolic) OR more   than 90 on the bottom number (diastolic), for example 140/90? Does not check    Hypothyroidism Follow-up    Since last visit, patient describes the following symptoms: Weight stable, no hair loss, no skin changes, no constipation, no loose stools        Constitutional, HEENT, cardiovascular, pulmonary, GI, , musculoskeletal, neuro, skin, endocrine and psych systems are negative, except as otherwise noted.        Objective           Vitals:  No vitals were obtained today due to " virtual visit.    Physical Exam   General: Alert and no distress //Respiratory: No audible wheeze, cough, or shortness of breath // Psychiatric:  Appropriate affect, tone, and pace of words            Phone call duration: 10 minutes  Signed Electronically by: Tisha Trevino CNP

## 2024-11-15 ENCOUNTER — OFFICE VISIT (OUTPATIENT)
Dept: FAMILY MEDICINE | Facility: CLINIC | Age: 79
End: 2024-11-15
Payer: MEDICARE

## 2024-11-15 ENCOUNTER — ANCILLARY PROCEDURE (OUTPATIENT)
Dept: GENERAL RADIOLOGY | Facility: CLINIC | Age: 79
End: 2024-11-15
Attending: FAMILY MEDICINE
Payer: MEDICARE

## 2024-11-15 ENCOUNTER — TELEPHONE (OUTPATIENT)
Dept: FAMILY MEDICINE | Facility: CLINIC | Age: 79
End: 2024-11-15

## 2024-11-15 VITALS
HEART RATE: 99 BPM | OXYGEN SATURATION: 96 % | DIASTOLIC BLOOD PRESSURE: 76 MMHG | WEIGHT: 196 LBS | BODY MASS INDEX: 37 KG/M2 | RESPIRATION RATE: 16 BRPM | HEIGHT: 61 IN | SYSTOLIC BLOOD PRESSURE: 124 MMHG | TEMPERATURE: 99 F

## 2024-11-15 DIAGNOSIS — J20.9 ACUTE BRONCHITIS, UNSPECIFIED ORGANISM: ICD-10-CM

## 2024-11-15 DIAGNOSIS — R05.9 COUGH, UNSPECIFIED TYPE: ICD-10-CM

## 2024-11-15 DIAGNOSIS — J45.30 MILD PERSISTENT ASTHMA WITHOUT COMPLICATION: Primary | ICD-10-CM

## 2024-11-15 PROBLEM — J18.9 MULTIFOCAL PNEUMONIA: Status: RESOLVED | Noted: 2023-11-24 | Resolved: 2024-11-15

## 2024-11-15 PROCEDURE — 71046 X-RAY EXAM CHEST 2 VIEWS: CPT | Mod: TC | Performed by: RADIOLOGY

## 2024-11-15 PROCEDURE — 99213 OFFICE O/P EST LOW 20 MIN: CPT | Performed by: FAMILY MEDICINE

## 2024-11-15 RX ORDER — ALBUTEROL SULFATE 90 UG/1
2 INHALANT RESPIRATORY (INHALATION) EVERY 4 HOURS PRN
Qty: 18 G | Refills: 5 | Status: SHIPPED | OUTPATIENT
Start: 2024-11-15

## 2024-11-15 RX ORDER — AZITHROMYCIN 250 MG/1
TABLET, FILM COATED ORAL
Qty: 6 TABLET | Refills: 0 | Status: SHIPPED | OUTPATIENT
Start: 2024-11-15 | End: 2024-11-20

## 2024-11-15 ASSESSMENT — ENCOUNTER SYMPTOMS: COUGH: 1

## 2024-11-15 NOTE — PROGRESS NOTES
Assessment & Plan   Mild persistent asthma without complication  Cough, unspecified type  Acute bronchitis, unspecified organism  - History of asthma and currently experiencing wheezing.  - Use the dulera inhaler twice daily, up to two puffs if symptoms worsen.  - Clinical presentation and chest radiograph are indicative of bronchitis, likely of viral etiology.  - Use bronchodilator inhalers to enhance airflow. Patient requested antibiotics; therefore, a z-elizabeth (azithromycin) will be prescribed with caution regarding potential hypersensitivity reactions.   - albuterol (PROAIR HFA/PROVENTIL HFA/VENTOLIN HFA) 108 (90 Base) MCG/ACT inhaler  Dispense: 18 g; Refill: 5  - mometasone-formoterol (DULERA) 100-5 MCG/ACT inhaler  Dispense: 13 g; Refill: 11    Return in about 1 week (around 11/22/2024) for symptoms failing to improve or sooner if worsening.          Rupesh Gandhi MD      [address] [address]  [facility]   Office: [phone]         Rossy Almanza is a 79 year old, presenting for the following health issues:  Cough      History of Present Illness       Reason for visit:  Can't breath  Symptom onset:  3-7 days ago  Symptom intensity:  Moderate  Symptom progression:  Staying the same  Had these symptoms before:  Yes  Has tried/received treatment for these symptoms:  Yes  Previous treatment was successful:  Yes  Prior treatment description:  Inhaler  What makes it worse:  No  What makes it better:  Not at this point   She is taking medications regularly.     Acute Illness  Acute illness concerns: Productive cough  Onset/Duration: a week ago  Symptoms:  Fever: unsure did not check   Chills/Sweats: No  Headache (location?): No  Sinus Pressure: No  Conjunctivitis:  No  Ear Pain: no  Rhinorrhea: YES  Congestion: YES  Sore Throat: at first  Cough: YES-productive of yellow sputum, productive of green sputum  Wheeze: YES  Decreased Appetite: No  Nausea:  "No  Vomiting: No  Diarrhea: No  Dysuria/Freq.: No  Dysuria or Hematuria: No  Fatigue/Achiness: No  Sick/Strep Exposure: No  Therapies tried and outcome: does her inhaler        Objective    /76   Pulse 99   Temp 99  F (37.2  C) (Tympanic)   Resp 16   Ht 1.549 m (5' 1\")   Wt 88.9 kg (196 lb)   LMP  (LMP Unknown)   SpO2 96%   BMI 37.03 kg/m    Body mass index is 37.03 kg/m .  Physical Exam   GENERAL: no acute distress  EYES: Conjunctiva are not injected, no discharge.  EARS: Left TM -no erythema, no effusion,  not bulged.               Right TM -no erythema, no effusion,  not bulged.  NOSE: no discharge, no sinus tenderness  THROAT: no erythema, no exudate, no lesions  NECK: supple, no adenopathy.  CARDIAC: regular rate and rhythm, no murmur  RESP: bilateral slight wheezing, no rales, no rhonchi  ABD: soft, no distension, no tenderness  SKIN: No rashes    CXR - Reviewed and interpreted by me Normal- no infiltrates, effusions, pneumothoraces, cardiomegaly or masses        Signed Electronically by: Peña Gandhi MD    "

## 2024-11-15 NOTE — TELEPHONE ENCOUNTER
Daughter calling. Not with Pt but would like to schedule same day appt. Cough, productive yellow/green,for one week. Cough is getting worse. Scheduled for this afternoon.     Vero Still RN [facility] [place]

## 2024-12-11 NOTE — PROGRESS NOTES
ESTABLISHED PATIENT NEUROLOGY NOTE    DATE OF VISIT: 12/12/2024  CLINIC LOCATION: [facility]  MRN: [id number]  PATIENT NAME: Cami Turner  YOB: 1945    REASON FOR VISIT: No chief complaint on file.    SUBJECTIVE:                                                      HISTORY OF PRESENT ILLNESS: Patient is here to follow up regarding mild cognitive impairment.  She was last seen on 6/10/2024. Please refer to my initial/other prior notes for further information.    Since the last visit, the patient reports ***.  She is on B12 replacement.  Her repeat B12 (422) and MMA (0.28) levels were normal in October 2024.  She denies interval development of new focal neurological symptoms.  EXAM:                                                    Physical Exam:   Vitals: LMP  (LMP Unknown)   [place] Cognitive Assessment:          [place] Cognitive Assessment Score:   /30.   General: pt is in NAD, cooperative.  Skin: normal turgor, moist mucous membranes, no lesions/rashes noticed.  HEENT: ATNC, white sclera, normal conjunctiva.  Respiratory: Symmetric lung excursion, no accessory respiratory muscle use.  Abdomen: Non distended.  Neurological: awake, cooperative, follows commands, no change compared to previous visits.  ASSESSMENT AND PLAN:                                                    Assessment: 79 year old female patient presents for follow-up of cognitive impairment in context of vitamin B12 deficiency.  She reports ***.  MoCA today is ***/30, compared to a range of 18-20/30.  Alzheimer's labs?    Diagnoses:    ICD-10-CM    1. Mild cognitive impairment  G31.84       2. Vitamin B12 deficiency (non anemic)  E53.8         Plan:  There are no Patient Instructions on file for this visit.    Total Time: *** minutes spent on the date of the encounter doing chart review, history and exam, documentation and further activities per the note.    Gilson Casarez MD  [facility]  Neurology  (Chart documentation was completed in part with Dragon voice-recognition software. Even though reviewed, some grammatical, spelling, and word errors may remain.)

## 2024-12-12 ENCOUNTER — OFFICE VISIT (OUTPATIENT)
Dept: NEUROLOGY | Facility: CLINIC | Age: 79
End: 2024-12-12
Payer: MEDICARE

## 2024-12-12 VITALS — OXYGEN SATURATION: 96 % | DIASTOLIC BLOOD PRESSURE: 84 MMHG | HEART RATE: 77 BPM | SYSTOLIC BLOOD PRESSURE: 134 MMHG

## 2024-12-12 DIAGNOSIS — E53.8 VITAMIN B12 DEFICIENCY (NON ANEMIC): ICD-10-CM

## 2024-12-12 DIAGNOSIS — G31.84 MILD COGNITIVE IMPAIRMENT: Primary | ICD-10-CM

## 2024-12-12 ASSESSMENT — MONTREAL COGNITIVE ASSESSMENT (MOCA)
10. [FLUENCY] NAME WORDS STARTING WITH DESIGNATED LETTER: 0
VISUOSPATIAL/EXECUTIVE SUBSCORE: 4
4. NAME EACH OF THE THREE ANIMALS SHOWN: 2
13. ORIENTATION SUBSCORE: 6
WHAT LEVEL OF EDUCATION WAS ATTAINED: 0
WHAT IS THE TOTAL SCORE (OUT OF 30): 18
8. SERIAL SUBTRACTION OF 7S: 1
9. REPEAT EACH SENTENCE: 0
12. MEMORY INDEX SCORE: 1
7. [VIGILENCE] TAP WHEN HEARING DESIGNATED LETTER: 1
6. READ LIST OF DIGITS [FORWARD/BACKWARD]: 1
11. FOR EACH PAIR OF WORDS, WHAT CATEGORY DO THEY BELONG TO (OUT OF 2): 2

## 2024-12-12 NOTE — LETTER
2024      Cami Turner  [address]  [place] [address]      Dear Colleague,    Thank you for referring your patient, Cami Turner, to the [facility]OLOG[facility]. Please see a copy of my visit note below.    ESTABLISHED PATIENT NEUROLOGY NOTE    DATE OF VISIT: 2024  CLINIC LOCATION: [facility]  MRN: [id number]  PATIENT NAME: Cami Turner  YOB: 1945    REASON FOR VISIT:   Chief Complaint   Patient presents with     Follow Up     Memory stable.     SUBJECTIVE:                                                      HISTORY OF PRESENT ILLNESS: Patient is here to follow up regarding mild cognitive impairment.  She was last seen on 6/10/2024. Please refer to my initial/other prior notes for further information. Accompanied by her caregiver.    Since the last visit, the patient reports that her memory is stable.  Caregiver agrees.  The patient is on B12 replacement.  Her repeat B12 (422) and MMA (0.28) levels were normal in 2024.  At that time her TSH was elevated, and her thyroid medication dose was increased.  She denies interval development of new focal neurological symptoms.  EXAM:                                                    Physical Exam:   Vitals: /84 (BP Location: Left arm, Patient Position: Sitting, Cuff Size: Adult Large)   Pulse 77   LMP  (LMP Unknown)   SpO2 96%   [place] Cognitive Assessment:    John Cognitive Assessment (MOCA)  Visuospatial/Executive : 4  Namin  Attention - Digits: 1  Attention - Letters: 1  Attention - Subtraction: 1  Language - Repeat: 0  Language - Fluency : 0  Abstraction: 2  Delayed Recall: 1  Orientation: 6  Education: 0  MOCA Score: 18     [place] Cognitive Assessment Score:  MOCA Score: 18/30.   General: pt is in NAD, cooperative.  Skin: normal turgor, moist mucous membranes, no lesions/rashes noticed.  HEENT: ATNC, white sclera, normal conjunctiva.  Respiratory:  Symmetric lung excursion, no accessory respiratory muscle use.  Abdomen: Non distended.  Neurological: awake, cooperative, follows commands, no change compared to previous visits.  ASSESSMENT AND PLAN:                                                    Assessment: 79 year old female patient presents for follow-up of cognitive impairment in context of vitamin B12 deficiency and hypothyroidism.  She reports that her memory is stable, which is in line with her cognitive assessment.  MoCA today is 18/30 (stable), compared to a range of 18-20/30.  We discussed additional lab testing to check for possibility of Alzheimer's disease and decided to proceed.  Will repeat MoCA in 6 months.  We discussed that I could see her sooner if her labs are suggestive of Alzheimer's disease.    Diagnoses:    ICD-10-CM    1. Mild cognitive impairment  G31.84       2. Vitamin B12 deficiency (non anemic)  E53.8         Plan: At today's visit we thoroughly discussed current symptoms, evaluation results, available treatment options, and the plan.    We decided to check labs for possibility of Alzheimer's disease while we continue to monitor patient's symptoms.  We will repeat cognitive test next time she comes.    Next follow-up appointment is in the next 6 months or earlier if needed.    Total Time: 21 minutes spent on the date of the encounter doing chart review, history and exam, documentation and further activities per the note.    Gilson Casarez MD  [facility] Neurology  (Chart documentation was completed in part with Dragon voice-recognition software. Even though reviewed, some grammatical, spelling, and word errors may remain.)    Cami Turner is a 79 year old female who presents for:  No chief complaint on file.       Initial Vitals:  /84 (BP Location: Left arm, Patient Position: Sitting, Cuff Size: Adult Large)   Pulse 77   LMP  (LMP Unknown)   SpO2 96%  Estimated body mass index is 37.03 kg/m  as calculated  "from the following:    Height as of 11/15/24: 1.549 m (5' 1\").    Weight as of 11/15/24: 88.9 kg (196 lb).. There is no height or weight on file to calculate BSA. BP completed using cuff size: large      MoCA 7.3=   18/30     Nabila Garcia       Again, thank you for allowing me to participate in the care of your patient.        Sincerely,        Gilson Casarez MD  "

## 2024-12-12 NOTE — PATIENT INSTRUCTIONS
AFTER VISIT SUMMARY (AVS):    At today's visit we thoroughly discussed current symptoms, evaluation results, available treatment options, and the plan.    We decided to check labs for possibility of Alzheimer's disease while we continue to monitor your symptoms.  We will repeat cognitive test next time you come.    Next follow-up appointment is in the next 6 months or earlier if needed.    Please do not hesitate to call me with any questions or concerns.    Thanks.

## 2024-12-12 NOTE — PROGRESS NOTES
"Cami Turner is a 79 year old female who presents for:  No chief complaint on file.       Initial Vitals:  /84 (BP Location: Left arm, Patient Position: Sitting, Cuff Size: Adult Large)   Pulse 77   LMP  (LMP Unknown)   SpO2 96%  Estimated body mass index is 37.03 kg/m  as calculated from the following:    Height as of 11/15/24: 1.549 m (5' 1\").    Weight as of 11/15/24: 88.9 kg (196 lb).. There is no height or weight on file to calculate BSA. BP completed using cuff size: large      MoCA 7.3=   18/30     Nabila Garcia   "

## 2024-12-12 NOTE — PROGRESS NOTES
ESTABLISHED PATIENT NEUROLOGY NOTE    DATE OF VISIT: 2024  CLINIC LOCATION: [facility]  MRN: [id number]  PATIENT NAME: Cami Turner  YOB: 1945    REASON FOR VISIT:   Chief Complaint   Patient presents with    Follow Up     Memory stable.     SUBJECTIVE:                                                      HISTORY OF PRESENT ILLNESS: Patient is here to follow up regarding mild cognitive impairment.  She was last seen on 6/10/2024. Please refer to my initial/other prior notes for further information. Accompanied by her caregiver.    Since the last visit, the patient reports that her memory is stable.  Caregiver agrees.  The patient is on B12 replacement.  Her repeat B12 (422) and MMA (0.28) levels were normal in 2024.  At that time her TSH was elevated, and her thyroid medication dose was increased.  She denies interval development of new focal neurological symptoms.  EXAM:                                                    Physical Exam:   Vitals: /84 (BP Location: Left arm, Patient Position: Sitting, Cuff Size: Adult Large)   Pulse 77   LMP  (LMP Unknown)   SpO2 96%   [place] Cognitive Assessment:    [place] Cognitive Assessment (MOCA)  Visuospatial/Executive : 4  Namin  Attention - Digits: 1  Attention - Letters: 1  Attention - Subtraction: 1  Language - Repeat: 0  Language - Fluency : 0  Abstraction: 2  Delayed Recall: 1  Orientation: 6  Education: 0  MOCA Score: 18     [place] Cognitive Assessment Score:  MOCA Score: 18/30.   General: pt is in NAD, cooperative.  Skin: normal turgor, moist mucous membranes, no lesions/rashes noticed.  HEENT: ATNC, white sclera, normal conjunctiva.  Respiratory: Symmetric lung excursion, no accessory respiratory muscle use.  Abdomen: Non distended.  Neurological: awake, cooperative, follows commands, no change compared to previous visits.  ASSESSMENT AND PLAN:                                                     Assessment: 79 year old female patient presents for follow-up of cognitive impairment in context of vitamin B12 deficiency and hypothyroidism.  She reports that her memory is stable, which is in line with her cognitive assessment.  MoCA today is 18/30 (stable), compared to a range of 18-20/30.  We discussed additional lab testing to check for possibility of Alzheimer's disease and decided to proceed.  Will repeat MoCA in 6 months.  We discussed that I could see her sooner if her labs are suggestive of Alzheimer's disease.    Diagnoses:    ICD-10-CM    1. Mild cognitive impairment  G31.84       2. Vitamin B12 deficiency (non anemic)  E53.8         Plan: At today's visit we thoroughly discussed current symptoms, evaluation results, available treatment options, and the plan.    We decided to check labs for possibility of Alzheimer's disease while we continue to monitor patient's symptoms.  We will repeat cognitive test next time she comes.    Next follow-up appointment is in the next 6 months or earlier if needed.    Total Time: 21 minutes spent on the date of the encounter doing chart review, history and exam, documentation and further activities per the note.    Gilson Casarez MD  [facility] Neurology  (Chart documentation was completed in part with Dragon voice-recognition software. Even though reviewed, some grammatical, spelling, and word errors may remain.)

## 2024-12-30 DIAGNOSIS — F41.9 ANXIETY: ICD-10-CM

## 2024-12-30 RX ORDER — ESCITALOPRAM OXALATE 20 MG/1
30 TABLET ORAL DAILY
Qty: 135 TABLET | Refills: 0 | Status: SHIPPED | OUTPATIENT
Start: 2024-12-30

## 2025-02-16 NOTE — ED PROVIDER NOTES
History     Chief Complaint:  Cough and Shortness of Breath       HPI   Cami Turner is a 78 year old female history of hypertension, elevated BMI, presents with over a week of feeling fevers, cough, difficulty breathing and difficulty sleeping.  Denies chest pain.  No abdominal pain.  No leg pain or swelling.  Symptoms worsened by exertion herself but she has been able to ambulate around.  Also with sore throat and congestion.  Also having diarrhea.  Non-smoker.  No reported lung disease.  Seen at urgent care today and had thorough workup.      Independent Historian:    none    Review of External Notes:  Reviewed office visit note from today for multifocal pneumonia and elevated troponin.    Allergies:  No Known Allergies     Physical Exam   Patient Vitals for the past 24 hrs:   BP Temp Temp src Pulse Resp SpO2   11/24/23 1845 -- -- -- -- -- 93 %   11/24/23 1830 -- -- -- -- -- 93 %   11/24/23 1815 -- -- -- -- -- 98 %   11/24/23 1800 (!) 174/80 -- -- -- -- --   11/24/23 1411 (!) 167/104 97.6  F (36.4  C) Temporal 80 20 96 %        Physical Exam  GENERAL: Patient alert, sitting in bed.  Slightly labored with breathing.  HEAD: Atraumatic.  NECK: No rigidity  CV: RRR, no murmurs rubs or gallops  PULM: Distant breath sounds bilaterally with inspiratory and expiratory wheezing bilaterally.  Mild accessory muscle use.  ABD: Soft, nontender, nondistended, no guarding  DERM: No rash. Skin warm and dry  EXTREMITY: Moving all extremities without difficulty.       Emergency Department Course   ECG  ECG interpreted by me.  Time 1444  NSR at 81. No ST elevation or depression. Normal intervals. Normal axis.  Occasional PVC.           Laboratory: Imaging:   Labs Ordered and Resulted from Time of ED Arrival to Time of ED Departure   TROPONIN T, HIGH SENSITIVITY - Abnormal       Result Value    Troponin T, High Sensitivity 18 (*)      No orders to display              Emergency Department Course & Assessments:      [facility] ([facility])  Rehab Progress Note    Patient Name: Tyler Mai  MRN: [id number]  Age: 65 y.o. Sex: male  : 1959  Hospital Length of Stay: 16 days  Date of Service: 2025   Chief Complaint: Cervical myelopathy s/p C2-6 PCDF and C2/3-C4/5 decompression on 2025     Subjective:     Basic Information  Admit Information: 65-year-old white male presented to [facility] ED on 2025 complaining of bilateral arm weakness, bilateral leg weakness, and worsening neck pain.  Reports right leg weaker than the left.  Recently admitted to [facility] for cervical myelopathy with plans for surgery, but patient had severe UTI requiring antibiotic therapy.  MRI on  significant for severe C3-4 level central canal stenosis with increased signal hyperintensity within the cervical cord from mid to lower aspect of C3 through mid aspect of C4 with central cord myelomalacia.  PMH significant for cervical myelopathy with history of C5-C6 fusion, CAD, HFrEF, ICMO, COPD, DM type 2, HLD, HTN, and PVD.  Workup significant for cervical myelopathy.  Patient inpatient family initially declined the neurosurgeon on call surgery.  Then surgery postpone due to inclement weather.  Tolerated posterior C3-C5 total laminectomies for decompression, placement of bilateral pars screws at C2 and bilateral lateral mass screws from C3 to C6 using [facility]us OCT System, and placement of local bone and AlphaGraft DBM for arthrodesis from C2 to C6 on  without perioperative complications.  Postoperatively neurosurgery documented increase mobility to bilateral upper extremities with a 5/5 hand grasp.  Recommended aspirin hard collar at all times, and [place] collar for showers..  Recommended removal staples on .  Gray continued due to urinary retention.  Tolerated transfer to [facility] inpatient rehab unit on  without incident.   Today's Information: No acute events overnight.  Sleeping          Interventions:  Medications   azithromycin 500 mg (ZITHROMAX) in 0.9% NaCl 250 mL intermittent infusion 500 mg (0 mg Intravenous Hold 11/24/23 1812)   predniSONE (DELTASONE) tablet 40 mg (has no administration in time range)   cefTRIAXone (ROCEPHIN) 1 g vial to attach to  mL bag for ADULTS or NS 50 mL bag for PEDS (1 g Intravenous $New Bag 11/24/23 1837)   ipratropium - albuterol 0.5 mg/2.5 mg/3 mL (DUONEB) neb solution 3 mL (3 mLs Nebulization $Given 11/24/23 1812)        Assessments, Independent Interpretation, Consult/Discussion of ManagementTests:     Discussed with hospitalist Dr. Miller  Social Determinants of Health affecting care:  None    Disposition:  The patient was admitted to the hospital under the care of Dr. Miller.     Impression & Plan         Medical Decision Making:  Symptoms consistent with pneumonia.   Chronic conditions complicating -hypertension.  DDx considered ACS, PE, myocarditis, sepsis.  Reviewed CT results from office visit today showing multifocal pneumonia and a pulmonary nodule.  Patient's BUN is slightly elevated.  Also mild leukocytosis based off of outpatient labs therefore do not repeat here.  Patient's troponin was 22 there.  I repeated here and is 19.  She has no chest pain.  Do not think this is ACS.  ECG is nonischemic.  CURB65 score 2 elevated  Patient high-risk for discharge.  Patient given DuoNebs for her wheezing.  Also provided prednisone due to a reactive airway component of the symptoms.  Given IV ceftriaxone and azithromycin.  Discussed with hospital Dr. Miller  Patient to be admitted.      Diagnosis:    ICD-10-CM    1. Multifocal pneumonia  J18.9       2. Pulmonary nodule  R91.1            Discharge Medications:  New Prescriptions    No medications on file          11/24/2023   Garfield Lauren MD Foss, Kevin, MD  11/24/23 4849     comfortably in bed.  Very good sleep overnight per staff report.  Easily arousable.  Surprisingly, no complaints today.  Staff reports that he requested digital disimpaction yesterday with subsequent good output.  Patient advised that staff will not be performing digital disimpaction today, as he has not impacted.  Generalized pruritus improved.  Last reported BM on 02/15.  Blood pressure remains mildly elevated.  Vitals otherwise stable with no recent recorded fevers.  No new labs or imaging.    Review of patient's allergies indicates:   Allergen Reactions    Hydrochlorothiazide      Other Reaction(s): Pancreatitis        Current Facility-Administered Medications:     acetaminophen tablet 650 mg, 650 mg, Oral, Q6H PRN, Ashley, Tony A, FNP, 650 mg at 02/09/25 0316    albuterol nebulizer solution 2.5 mg, 2.5 mg, Nebulization, Q4H PRN, [place], Shannan A, FNP, 2.5 mg at 02/16/25 0724    ALPRAZolam tablet 0.25 mg, 0.25 mg, Oral, TID PRN, Ashley, Tony A, FNP, 0.25 mg at 02/10/25 2333    [START ON 2/17/2025] amLODIPine tablet 10 mg, 10 mg, Oral, Daily, Tyler De Anda MD    aspirin chewable tablet 81 mg, 81 mg, Oral, Daily, Ashley, Tony A, FNP, 81 mg at 02/16/25 0908    atorvastatin tablet 40 mg, 40 mg, Oral, Daily, Ashley, Tony A, FNP, 40 mg at 02/16/25 0908    benzonatate capsule 100 mg, 100 mg, Oral, TID PRN, Ashley, Tony A, FNP, 100 mg at 02/10/25 0718    bisacodyL EC tablet 5 mg, 5 mg, Oral, Daily, [place], Shannan A, FNP, 5 mg at 02/16/25 0908    bisacodyL suppository 10 mg, 10 mg, Rectal, Daily PRN, [place], Shannan A, FNP, 10 mg at 02/15/25 1307    camphor-menthol 0.5-0.5% lotion, , Topical (Top), PRN, [place], Shannan A, FNP, Given at 02/15/25 0631    carvediloL tablet 25 mg, 25 mg, Oral, BID AC, Tony Ramirez, FNP, 25 mg at 02/16/25 0515    clopidogreL tablet 75 mg, 75 mg, Oral, Daily, Tony Ramirez, FNP, 75 mg at 02/16/25 0908    cyclobenzaprine tablet 5 mg, 5 mg, Oral,  TID PRN, Shannan Puri FNP    dextrose 50% injection 12.5 g, 12.5 g, Intravenous, PRN, Ashley, Tony A, FNP    dextrose 50% injection 25 g, 25 g, Intravenous, PRN, Ashley, Tony A, FNP    diphenhydrAMINE-zinc acetate 2-0.1% cream, , Topical (Top), TID PRN, Tyler De Anda MD    DULoxetine DR capsule 30 mg, 30 mg, Oral, Daily, Shannan Puri, FNP, 30 mg at 02/16/25 0909    enoxaparin injection 40 mg, 40 mg, Subcutaneous, Q24H (prophylaxis, 1700), Ashley Tony A, FNP, 40 mg at 02/15/25 1617    finasteride tablet 5 mg, 5 mg, Oral, Daily, Ashley, Tony A, FNP, 5 mg at 02/16/25 0908    umeclidinium-vilanteroL 62.5-25 mcg/actuation DsDv 1 puff, 1 puff, Inhalation, Daily, 1 puff at 02/16/25 0800 **AND** fluticasone furoate 200 mcg/actuation inhaler 1 puff, 1 puff, Inhalation, Daily, Ashley, Tony A, FNP, 1 puff at 02/16/25 0800    furosemide tablet 20 mg, 20 mg, Oral, Daily, Ashley, Tony A, FNP, 20 mg at 02/16/25 0909    glucagon (human recombinant) injection 1 mg, 1 mg, Intramuscular, PRN, Ashley, Tony A, FNP    glucose chewable tablet 16 g, 16 g, Oral, PRN, Ashley, Tony A, FNP    glucose chewable tablet 24 g, 24 g, Oral, PRN, Ashley, Tony A, FNP    hydrALAZINE injection 10 mg, 10 mg, Intravenous, Q4H PRN, Ashley, Tony A, FNP, 10 mg at 02/04/25 0629    hydrocortisone 2.5 % rectal cream, , Rectal, BID PRN, Shannan Puri FNP, Given at 02/15/25 2125    hydrOXYzine pamoate capsule 25 mg, 25 mg, Oral, Q4H PRN, Ashley, Tony A, FNP, 25 mg at 02/16/25 0515    hydrOXYzine pamoate capsule 50 mg, 50 mg, Oral, Nightly PRN, Ashley, Tony A, FNP, 50 mg at 02/15/25 2125    insulin aspart U-100 injection 0-10 Units, 0-10 Units, Subcutaneous, QID (AC + HS) PRN, Ashley, Tony A, FNP, 2 Units at 02/16/25 1133    labetalol 20 mg/4 mL (5 mg/mL) IV syring, 10 mg, Intravenous, Q4H PRN, Ashley, Tony A, FNP    lactulose 20 gram/30 mL solution Soln 30 g, 30  g, Oral, BID, Tyler De Anda MD, 30 g at 02/15/25 2124    LIDOcaine 5 % patch 1 patch, 1 patch, Transdermal, Q24H, Shannan Puri FNP, 1 patch at 02/15/25 1725    linaCLOtide capsule 145 mcg, 145 mcg, Oral, QAM, Tony Ramirez A, FNP, 145 mcg at 02/16/25 0515    losartan tablet 50 mg, 50 mg, Oral, Before breakfast, Ashley Tony A, FNP, 50 mg at 02/16/25 0515    magnesium citrate solution 296 mL, 296 mL, Oral, Once PRN, Shannan Puri, FNP    magnesium oxide tablet 400 mg, 400 mg, Oral, TID, Ashley Tony A, FNP, 400 mg at 02/16/25 1255    melatonin tablet 6 mg, 6 mg, Oral, Nightly PRN, Bobby Puriyn A, FNP, 6 mg at 02/02/25 2056    methocarbamoL tablet 750 mg, 750 mg, Oral, QID (AC & HS), Bobby Puriyn FLAQUITA, FNP, 750 mg at 02/16/25 1130    metoprolol injection 10 mg, 10 mg, Intravenous, Q2H PRN, Tony Ramirez, FNP    miconazole NITRATE 2 % top powder, , Topical (Top), BID, Shannan Puri FNP, Given at 02/16/25 0800    montelukast chewable tablet 10 mg, 10 mg, Oral, Daily, Tyler De Anda MD, 10 mg at 02/16/25 0908    naloxegoL (MOVANTIK) tablet 25 mg, 25 mg, Oral, Daily, Tyler De Anda MD, 25 mg at 02/16/25 0908    nitroGLYCERIN SL tablet 0.4 mg, 0.4 mg, Sublingual, Q5 Min PRN, Tony Ramirez A, FNP    ondansetron disintegrating tablet 4 mg, 4 mg, Oral, Q6H PRN, Ashley Tony A, FNP    ondansetron disintegrating tablet 8 mg, 8 mg, Oral, Q8H PRN, Tony Ramirez A, FNP    ondansetron injection 4 mg, 4 mg, Intravenous, Q8H PRN, Tony Ramirez FNP    oxyCODONE-acetaminophen 5-325 mg per tablet 1 tablet, 1 tablet, Oral, Q4H PRN, Tyler De Anda MD, 1 tablet at 02/16/25 1311    pantoprazole EC tablet 40 mg, 40 mg, Oral, Daily, Tony Ramirez FNP, 40 mg at 02/16/25 0908    predniSONE tablet 20 mg, 20 mg, Oral, Daily, Tyler De Anda MD, 20 mg at 02/16/25 0908    pregabalin capsule 100 mg, 100 mg, Oral, TID, Shannan Puri FNP, 100 mg at 02/16/25 1254     "QUEtiapine tablet 50 mg, 50 mg, Oral, QHS, Tyler De Anda MD    ranolazine 12 hr tablet 500 mg, 500 mg, Oral, BID, AshleyTony pritchettMARIVEL, 500 mg at 02/16/25 0908    triamcinolone acetonide 0.1% ointment, , Topical (Top), BID, Tyler De Anda MD, Given at 02/16/25 0800     Review of Systems   Complete 12-point review of symptoms negative except for what's mentioned in HPI     Objective:     BP (!) 156/72   Pulse 89   Temp 97.7 °F (36.5 °C) (Oral)   Resp 18   Ht 5' 6" (1.676 m)   Wt 73.4 kg (161 lb 13.1 oz)   SpO2 95%   BMI 26.12 kg/m²      Physical Exam  Constitutional:       Appearance: Normal appearance.   HENT:      Head: Normocephalic.   Eyes:      Pupils: Pupils are equal, round, and reactive to light.   Neck:      Comments: Posterior neck incision clean and intact.  Hard collar intact  Cardiovascular:      Rate and Rhythm: Normal rate and regular rhythm.      Heart sounds: Normal heart sounds.   Pulmonary:      Effort: Pulmonary effort is normal. No respiratory distress.      Breath sounds: Normal breath sounds. No wheezing or rhonchi.   Abdominal:      General: Bowel sounds are normal.      Palpations: Abdomen is soft.      Tenderness: There is no abdominal tenderness. There is no guarding.   Musculoskeletal:      Cervical back: Neck supple.      Comments: Diffuse muscle atrophy   Skin:     General: Skin is warm and dry.   Neurological:      General: No focal deficit present.      Mental Status: He is alert and oriented to person, place, and time.      Motor: Weakness present.   Psychiatric:         Mood and Affect: Mood normal.         Behavior: Behavior normal.         Thought Content: Thought content normal.         Judgment: Judgment normal.     Labs  Recent Results (from the past 24 hours)   POCT glucose    Collection Time: 02/15/25  4:24 PM   Result Value Ref Range    POCT Glucose 198 (H) 70 - 110 mg/dL   POCT glucose    Collection Time: 02/15/25  9:21 PM   Result Value Ref Range    POCT " Glucose 181 (H) 70 - 110 mg/dL   POCT glucose    Collection Time: 02/16/25  5:11 AM   Result Value Ref Range    POCT Glucose 193 (H) 70 - 110 mg/dL   POCT glucose    Collection Time: 02/16/25 11:31 AM   Result Value Ref Range    POCT Glucose 164 (H) 70 - 110 mg/dL     Radiology   Left upper extremity NIVA on 02/12/2025, IMPRESSION:  Negative for DVT  Radiology  Cervical XR two-view on 01/25/2025, IMPRESSION: There are postoperative changes with posterior spinal fusion hardware at C2 through C6. The hardware appears intact. Cervical alignment has improved. The remaining vertebral body heights are preserved. There is moderate disc height loss at C3-C4, mild at C4-C5, with multilevel marginal osteophytes. The soft tissues are unremarkable.     Assessment/Plan:     65 y.o. WM admitted on 1/31/2025    Cervical myelopathy   - s/p C2-6 PCDF and C2/3-C4/5 decompression on 01/24/2025  - remove staples on 02/07  - continue aspirin hard collar at all times, okay for [place] collar for showers only  - Zanaflex discontinued on 02/05 due to orthostatic hypotension  - discontinued Percocet  mg q.4 hours severe pain on 2/15  - continue                Robaxin 750 mg q.i.d. (decreased 2/5)                Flexeril 10 mg t.i.d. p.r.n.                Percocet 5-325 mg q.4 hours p.r.n. severe pain                Lyrica 100 mg t.i.d. (increased 2/3)    Lidocaine patch daily    Cymbalta 30 mg daily (initiated 2/1)  - defer to physiatry for rehab and pain management  - PT/OT/RT/ST following  - to follow up with Neurosurgery outpatient     Coronary artery disease  - s/p  CABG x 3  - denies recent chest pain or discomfort  - continue                ASA 81 mg daily (resumed 1/31)                Plavix 75 mg daily (resume 1/31)                Coreg 25 mg b.i.d. (increased 2/12)                Lipitor 40 mg daily                Losartan 50 mg (increased 2/14)  - ECG and Nitro PRN  - continue cardiac diet  - to follow-up with  cardiology outpatient     HFrEF/ICMO  - stable  - s/p Lasix 40 mg IVP x1 on 2/10  - continue   Lasix 20 mg oral daily (decreased 2/13)                Coreg 25 mg b.i.d. (increased 2/12)                Losartan 50 mg daily (increased 2/14)                Ranexa 500 mg b.i.d.   - obtain daily standing weights  - to follow up with cardiology outpatient     Nicotine dependence  - smoking cessation counseling given on admission  - 1 PPD with approximately 55 pack years     HTN  -BP improved and at goal  - discontinued Flomax 0.4 mg at bedtime on 02/04 due to orthostatic hypotension  - discontinued Zanaflex on 02/05 due to orthostatic hypotension  - s/p NS 1 L bolus on 02/05 due to orthostatic hypotension  - discontinued Norvasc 5 mg b.i.d. on 2/12 due to bilateral lower extremity edema  - continue   Norvasc 5 mg daily (initiated 2/14)  Coreg 25 mg b.i.d. (increased 2/12)  Losartan 50 mg daily (increased 2/14)  Ranexa 500 mg b.i.d.   Hydralazine 10 mg every 2 hours as needed for BP > 160/90  Labetalol 10 mg every 2 hours as needed for BP > 160/90  - low sodium diet             COPD   - stable  - not on home 02  - continue                Trelegy INH daily                   DM type II  - HgA1c 5.6 on 01/23/2024  - good glycemic control  - continue                Lantus 10 units every evening (resumed 1/31)                ISS   - CBGs AC/HS     HLD  - FLP outpatient  - resume Crestor upon discharge  - continue                Lipitor 40 mg daily      PVD  - stable  - continue                ASA 81 mg daily (resumed 1/31)                Plavix 75 mg daily (resume 1/31)                Coreg 12.5 mg b.i.d. (increased 2/11)                Lipitor 40 mg daily  - to follow up vascular surgery outpatient                  Chronic constipation  - last BM 2/14  - reporting hard stools  - continue  Movantik 25 mg daily (initiated 2/15)  Lactulose 30 g twice daily (initiated 2/13; increased 2/15)  Linzess 145 mcg before breakfast    Colace 100 mg b.i.d.  - encourage hydration    Major depressive disorder  - stable  - continue   Seroquel 25 mg at bedtime (initiated 2/15; decreased from 50 mg on 2/16)   Cymbalta 30 mg daily (initiated 2/1)     Hypomagnesemia  - stable  - continue                Magnesium oxide 400 mg b.i.d.      BPH/urinary retention  - stable  - discontinued Flomax 0.4 mg at bedtime on 02/04 due to orthostatic hypotension  - continue                Proscar 5 mg daily (initiated 1/31)  Indwelling catheter discontinued on 02/01-good urine output with no retention reported     GERD  - Avoid spicy foods, and nothing to eat or drink within x2 hours of bedtime or laying flat (water is ok)   - Avoid NSAIDs (Advil, ibuprofen, naproxen...) and chavira-2 inhibitors (Mobic, Celebrex)    - continue                Protonix 40 mg daily      Normocytic anemia  - asymptomatic  - H/H stable   - no evidence of active bleeds  - will closely monitor and transfuse if needed     Hypomagnesemia  - magnesium 1.5 on 2/10  - s/p Mag sulfate 2 g x 1 IVPB on 02/01  - continue   Mag-Ox 400 mg tID (increased 2/10)    Seasonal allergies  - stable  - continue   Flonase daily    Pruritus  - stable with improvement  - continue   Prednisone 20 mg po qday 2/15-present (final dose on 2/17)   Singulair 10 mg qday (initiated 2/15)   Triamcinolone 0.1% to affected areas twice daily (initiated 2/15)   Vistaril 25 mg q.4 hours p.r.n. (initiated 2/14)   Aquaphor daily as needed     VTE Prophylaxis: Lovenox 40 mg daily   COVID-19 testing:  Unknown  COVID-19 vaccination status:  Vaccinated (Moderna):  Bivalent booster on 04/03/2023     POA:  No  Living will:  No  Contacts:  Tyler Mai Jr (son) [phone]     CODE STATUS:  Full code  Internal Medicine (attending): Tyler De Anda MD  Physiatry (consulting):  Philip Fang MD     OUTPATIENT PROVIDERS  PCP: None  Neurosurgery: Darrell Zavala MD     DISPOSITION:  Condition stable.  Reporting hard stools and ongoing  generalized pruritus.  Adequate pain control.  Good appetite.  No urinary complaints.  Generalized pruritus improved.  Last reported BM on 02/15.  Blood pressure remains mildly elevated.  Vitals otherwise stable with no recent recorded fevers.  No new labs or imaging.    Increase amlodipine at 10 mg daily for improved blood pressure control.  Decrease Seroquel to 25 mg at bedtime (50 mg at bedtime initiated on 02/15).  Initiated Movantik 25 mg daily, Singulair 10 mg daily, triamcinolone 0.1% to affected areas twice daily, and prednisone 20 mg daily x3 days on 2/15.  Lactulose increased to 30 g b.i.d. and Percocet  mg q.4 hours p.r.n. severe pain discontinued on 2/15.  No manual disimpactions today 2/16.  Discussed with staff.  Hard C-collar to be worn at all times per surgery recs.  Encourage frequent pressure offloading to minimize risk of pressure ulcer formation.  Out of bed WA as tolerated.  Strongly encourage nutrition and hydration.  Registered dietician following.  Encourage compliance with hourly incentive spirometer use WA.  Supplemental oxygen as needed to maintain saturations >92%.  Continue aggressive therapies and nutritional support.  Monitor closely and notify with any acute changes.  Labs in a.m..    Staffing 2/12/2025: Continent of bowel and bladder. Left arm redness, maybe infiltration. RT: Overall supervision to set up.  More cooperative this week.  Limited by dexterity. Appetite is good at 85%. PT: Good progress this week. Pain well controlled.  Tolerated 8 steps.  Overall CGA, but unsafe d/t weakness.  Needs a chair at platform to rest between steps.  Sleeps in a recliner at home.  Needs kasia mat. Ambulating 105-120 feet with walker.  Distance in inconsistent 2/2 fatigue. Limited insight into deficits.  OT: Max assist with UE and LE dressing.  Cannot wear shoes 2/2 heel wound. Wearing PRAFO boots. Mod assist with toileting.  Set up with oral care and eating. Unable to denny and doff  C-collar. Projected discharge pending.      Total time spent on this encounter including chart review and direct 1-on-1 patient interaction: 50 minutes   Over 50% of this time was spent in counseling and coordination of care

## 2025-03-31 DIAGNOSIS — F41.9 ANXIETY: ICD-10-CM

## 2025-03-31 RX ORDER — ESCITALOPRAM OXALATE 20 MG/1
30 TABLET ORAL DAILY
Qty: 135 TABLET | Refills: 3 | Status: SHIPPED | OUTPATIENT
Start: 2025-03-31

## 2025-04-23 ENCOUNTER — HOSPITAL ENCOUNTER (OUTPATIENT)
Dept: CT IMAGING | Facility: CLINIC | Age: 80
Discharge: HOME | End: 2025-04-23
Attending: PHYSICIAN ASSISTANT
Payer: MEDICARE

## 2025-04-23 DIAGNOSIS — R93.89 ABNORMAL CT OF THE CHEST: ICD-10-CM

## 2025-04-23 PROCEDURE — 71250 CT THORAX DX C-: CPT

## 2025-04-29 ENCOUNTER — OFFICE VISIT (OUTPATIENT)
Dept: PULMONOLOGY | Facility: CLINIC | Age: 80
End: 2025-04-29
Payer: MEDICARE

## 2025-04-29 VITALS
RESPIRATION RATE: 20 BRPM | OXYGEN SATURATION: 97 % | HEART RATE: 68 BPM | WEIGHT: 194 LBS | BODY MASS INDEX: 36.66 KG/M2 | SYSTOLIC BLOOD PRESSURE: 132 MMHG | DIASTOLIC BLOOD PRESSURE: 75 MMHG

## 2025-04-29 DIAGNOSIS — J45.30 MILD PERSISTENT ASTHMA WITHOUT COMPLICATION: Primary | ICD-10-CM

## 2025-04-29 PROCEDURE — 3075F SYST BP GE 130 - 139MM HG: CPT | Performed by: PHYSICIAN ASSISTANT

## 2025-04-29 PROCEDURE — 3078F DIAST BP <80 MM HG: CPT | Performed by: PHYSICIAN ASSISTANT

## 2025-04-29 PROCEDURE — 99214 OFFICE O/P EST MOD 30 MIN: CPT | Performed by: PHYSICIAN ASSISTANT

## 2025-04-29 ASSESSMENT — ASTHMA QUESTIONNAIRES
ACT_TOTALSCORE: 25
QUESTION_5 LAST FOUR WEEKS HOW WOULD YOU RATE YOUR ASTHMA CONTROL: COMPLETELY CONTROLLED
QUESTION_4 LAST FOUR WEEKS HOW OFTEN HAVE YOU USED YOUR RESCUE INHALER OR NEBULIZER MEDICATION (SUCH AS ALBUTEROL): NOT AT ALL
QUESTION_3 LAST FOUR WEEKS HOW OFTEN DID YOUR ASTHMA SYMPTOMS (WHEEZING, COUGHING, SHORTNESS OF BREATH, CHEST TIGHTNESS OR PAIN) WAKE YOU UP AT NIGHT OR EARLIER THAN USUAL IN THE MORNING: NOT AT ALL
QUESTION_2 LAST FOUR WEEKS HOW OFTEN HAVE YOU HAD SHORTNESS OF BREATH: NOT AT ALL
QUESTION_1 LAST FOUR WEEKS HOW MUCH OF THE TIME DID YOUR ASTHMA KEEP YOU FROM GETTING AS MUCH DONE AT WORK, SCHOOL OR AT HOME: NONE OF THE TIME
ACT_TOTALSCORE: 25

## 2025-04-29 NOTE — LETTER
4/29/2025      Cami Turner  [address]  [place] [address]      Dear Colleague,    Thank you for referring your patient, Cami Turner, to the [facility] [place]. Please see a copy of my visit note below.    [facility]- Pulmonary Clinic  Pulmonary Follow Up    Name: Cami Turner MRN: [id number]     Age: 79 year old   YOB: 1945       Reason for Visit:   Chief Complaint   Patient presents with     Follow Up             Assessment and Plan:         # Moderate intermittent asthma  Hospitalized 11/24-11/26/2023 with pneumonia, improved with antibiotics and steroids. She has history of mild asthma dating back to childhood with rare symptoms, used albuterol inhaler sparingly and has never been hospitalized or had urgent care visits for asthma. PFTs demonstrated mild obstruction with significant bronchodilator response (+27%), consistent with asthma. Endorses mild seasonal allergies, takes OTC antihistamine PRN with benefit. Peripheral eosinophils 100 (11/24/2023). At our clinic visit 1/2024 I recommended she stop ICS (Flovent) as prescribed from the hospital, and started ICS-LABA per asthma gold standard recommendations. She presents today reporting that Dulera has been helpful but she takes it more so on an as needed bases, on avg <1 puff daily This works well for her, so I recommend she continue Dulera PRN. She can continue to use albuterol inhaler/nebulizer, and OTC antihistamines PRN.   - Continue ICS/LABA (Dulera 100-5). OK to substitute with formulary equivalent per insurance.   - OTC antihistamines PRN    # Abnormal CT chest / Pulmonary nodule  CT PE 11/24/2023 during hospitalization with pneumonia noted 1.5cm RUL nodule, suggestive of acute granulomatous. She is considered low risk. Follow up CT chest 2/29/2024 with stable RUL nodule, other inflammatory changes are resolved. Stable on CT chest 4/2025. No further follow up needed since I have a low  suspicion for malignancy since it is now centrally calcified which is more so consistent with a granuloma, it has been stable for 17+ months, and she does not have any identifiable risk factors for malignancy.          Return to clinic as needed      45 minutes spent reviewing chart, reviewing test results, talking with and examining patient, formulating plan, and documentation on the day of the encounter.    Jackie Castillo PA-C  General Pulmonology              HPI:   Cami Turner is a 78 year old female with history of asthma and HTN who presents for follow up of asthma. Here with her caretaker Cecy. I last saw her 4/2024.     Hospitalized at [facility] 11/24-11/26/2023 with shortness of breath, cough and wheeze. CT with bilateral upper and lower lobe infiltrates concerning for pneumonia. Managed with antibiotics and steroids. Discharged with flovent , refills of albuterol inhaler PRN, and duoneb PRN. Has history of asthma and allergies in childhood. At our last clinic visit I continued dulera and ordered a follow up CT chest (4/2025).     She feels better with Dulera, just needs 1 puff once every 2 weeks or so, albuterol seems more effective. Uses albuterol very infrequently. Not coughing much, has phlegm in her chest/throat that she wakes up with sometimes. No wheeze or dyspnea. No seasonal allergies. No cough, GERD, LE edema, dysphagia. Dulera seems more effective than flovent. She has a nebulizer machine but doesn't use it.     10 point ROS performed and negative except for as noted in HPI.    Tobacco or vaping: never a smoker  Up to date COVID, flu, pneumococcal and RSV vaccines           Past Medical History:     Past Medical History:   Diagnosis Date     Morbid obesity (H) 07/06/2020     Multifocal pneumonia 11/24/2023             Past Surgical History:      Past Surgical History:   Procedure Laterality Date     ANKLE SURGERY Left      HIP SURGERY Right              Social History:     Social History      Socioeconomic History     Marital status:      Spouse name: Not on file     Number of children: Not on file     Years of education: Not on file     Highest education level: Not on file   Occupational History     Not on file   Tobacco Use     Smoking status: Never     Smokeless tobacco: Never   Vaping Use     Vaping status: Never Used   Substance and Sexual Activity     Alcohol use: Yes     Comment: 3 drinks per week     Drug use: Never     Sexual activity: Not Currently     Partners: Male   Other Topics Concern     Not on file   Social History Narrative     Not on file     Social Drivers of Health     Financial Resource Strain: Low Risk  (7/16/2024)    Financial Resource Strain      Within the past 12 months, have you or your family members you live with been unable to get utilities (heat, electricity) when it was really needed?: No   Food Insecurity: Low Risk  (7/16/2024)    Food Insecurity      Within the past 12 months, did you worry that your food would run out before you got money to buy more?: No      Within the past 12 months, did the food you bought just not last and you didn t have money to get more?: No   Transportation Needs: Low Risk  (7/16/2024)    Transportation Needs      Within the past 12 months, has lack of transportation kept you from medical appointments, getting your medicines, non-medical meetings or appointments, work, or from getting things that you need?: No   Physical Activity: Unknown (7/16/2024)    Exercise Vital Sign      Days of Exercise per Week: 1 day      Minutes of Exercise per Session: Not on file   Stress: No Stress Concern Present (7/16/2024)    [employer] - Occupational Stress Questionnaire      Feeling of Stress : Not at all   Social Connections: Unknown (7/16/2024)    Social Connection and Isolation Panel [NHANES]      Frequency of Communication with Friends and Family: Not on file      Frequency of Social Gatherings with Friends and  Family: Once a week      Attends Sabianist Services: Not on file      Active Member of Clubs or Organizations: Not on file      Attends Club or Organization Meetings: Not on file      Marital Status: Not on file   Interpersonal Safety: Low Risk  (11/15/2024)    Interpersonal Safety      Do you feel physically and emotionally safe where you currently live?: Yes      Within the past 12 months, have you been hit, slapped, kicked or otherwise physically hurt by someone?: No      Within the past 12 months, have you been humiliated or emotionally abused in other ways by your partner or ex-partner?: No   Housing Stability: Low Risk  (7/16/2024)    Housing Stability      Do you have housing? : Yes      Are you worried about losing your housing?: No            Family History:   No family history on file.          Allergies:   No Known Allergies          Medications:     Current Outpatient Medications   Medication Sig Dispense Refill     albuterol (PROAIR HFA/PROVENTIL HFA/VENTOLIN HFA) 108 (90 Base) MCG/ACT inhaler Inhale 2 puffs into the lungs every 4 hours as needed for shortness of breath, wheezing or cough. 18 g 5     amLODIPine (NORVASC) 5 MG tablet Take 1 tablet (5 mg) by mouth daily 90 tablet 3     aspirin (ASA) 81 MG chewable tablet Take 81 mg by mouth daily Take 1 tab daily       atorvastatin (LIPITOR) 10 MG tablet Take 1 tablet (10 mg) by mouth daily 90 tablet 3     cyanocobalamin (VITAMIN B-12) 1000 MCG tablet Take 1,000 mcg by mouth daily       escitalopram (LEXAPRO) 20 MG tablet TAKE ONE AND ONE-HALF TABLETS DAILY 135 tablet 3     levothyroxine (SYNTHROID/LEVOTHROID) 50 MCG tablet Take 1 tablet (50 mcg) by mouth daily. 90 tablet 3     lisinopril (ZESTRIL) 40 MG tablet TAKE 1 TABLET DAILY 90 tablet 1     mometasone-formoterol (DULERA) 100-5 MCG/ACT inhaler Inhale 1-2 puffs into the lungs 2 times daily. 13 g 11     vitamin D3 (CHOLECALCIFEROL) 50 mcg (2000 units) tablet Take 1 tablet by mouth daily 1 tab by mouth  daily       No current facility-administered medications for this visit.              Exam:   /75   Pulse 68   Resp 20   Wt 88 kg (194 lb)   LMP  (LMP Unknown)   SpO2 97%   BMI 36.66 kg/m      Gen: well-appearing, NAD  CV: RRR no murmur  Resp: Normal respiratory effort on room air. Clear to auscultation  Skin: no obvious rashes on gross evaluation  Neuro: alert and appropriate, no focal abnormalities         Data:     I have personally reviewed all pertinent labs, imaging studies and PFT results unless otherwise noted.    Labs:  Eosinophils 11/2023- 100    Imaging:  CT chest 4/2025  Stable RLL nodule    CT chest w/o 2/29/24-  1.  Previously seen infiltrates have resolved.   2.  Stable nodule with a central calcification suggestive of   granulomatous change in the superior segment of the right lower lobe.   Consider an additional 12-month follow-up study to confirm stability.   3.  No gross adenopathy demonstrated in the absence of contrast. This   has probably improved since previous and may have been reactive.     CT PE 11/24/2023-  IMPRESSION:  1.  No evidence of pulmonary embolus.  2.  Bilateral upper and lower lobe pneumonia.  3.  1.5 cm nodule right upper lobe has some features suggestive of  acute granulomatous disease. Three-month follow-up chest CT is  recommended.  4.  Mild right hilar and mediastinal lymphadenopathy can also be  reassessed on follow-up.    PFT: 1/22/2024- mild obstruction with significant bronchodilator response. Normal lung volumes and diffusion capacity.          Again, thank you for allowing me to participate in the care of your patient.        Sincerely,        Jackie Castillo PA-C    Electronically signed

## 2025-04-29 NOTE — PROGRESS NOTES
[facility]- Pulmonary Clinic  Pulmonary Follow Up    Name: Cami Turner MRN: [id number]     Age: 79 year old   YOB: 1945       Reason for Visit:   Chief Complaint   Patient presents with    Follow Up             Assessment and Plan:         # Moderate intermittent asthma  Hospitalized 11/24-11/26/2023 with pneumonia, improved with antibiotics and steroids. She has history of mild asthma dating back to childhood with rare symptoms, used albuterol inhaler sparingly and has never been hospitalized or had urgent care visits for asthma. PFTs demonstrated mild obstruction with significant bronchodilator response (+27%), consistent with asthma. Endorses mild seasonal allergies, takes OTC antihistamine PRN with benefit. Peripheral eosinophils 100 (11/24/2023). At our clinic visit 1/2024 I recommended she stop ICS (Flovent) as prescribed from the hospital, and started ICS-LABA per asthma gold standard recommendations. She presents today reporting that Dulera has been helpful but she takes it more so on an as needed bases, on avg <1 puff daily This works well for her, so I recommend she continue Dulera PRN. She can continue to use albuterol inhaler/nebulizer, and OTC antihistamines PRN.   - Continue ICS/LABA (Dulera 100-5). OK to substitute with formulary equivalent per insurance.   - OTC antihistamines PRN    # Abnormal CT chest / Pulmonary nodule  CT PE 11/24/2023 during hospitalization with pneumonia noted 1.5cm RUL nodule, suggestive of acute granulomatous. She is considered low risk. Follow up CT chest 2/29/2024 with stable RUL nodule, other inflammatory changes are resolved. Stable on CT chest 4/2025. No further follow up needed since I have a low suspicion for malignancy since it is now centrally calcified which is more so consistent with a granuloma, it has been stable for 17+ months, and she does not have any identifiable risk factors for malignancy.          Return to clinic as needed      45  minutes spent reviewing chart, reviewing test results, talking with and examining patient, formulating plan, and documentation on the day of the encounter.    Jackie Castillo PA-C  General Pulmonology              HPI:   Cami Turner is a 78 year old female with history of asthma and HTN who presents for follow up of asthma. Here with her caretaker Cecy. I last saw her 4/2024.     Hospitalized at [facility] 11/24-11/26/2023 with shortness of breath, cough and wheeze. CT with bilateral upper and lower lobe infiltrates concerning for pneumonia. Managed with antibiotics and steroids. Discharged with flovent , refills of albuterol inhaler PRN, and duoneb PRN. Has history of asthma and allergies in childhood. At our last clinic visit I continued dulera and ordered a follow up CT chest (4/2025).     She feels better with Dulera, just needs 1 puff once every 2 weeks or so, albuterol seems more effective. Uses albuterol very infrequently. Not coughing much, has phlegm in her chest/throat that she wakes up with sometimes. No wheeze or dyspnea. No seasonal allergies. No cough, GERD, LE edema, dysphagia. Dulera seems more effective than flovent. She has a nebulizer machine but doesn't use it.     10 point ROS performed and negative except for as noted in HPI.    Tobacco or vaping: never a smoker  Up to date COVID, flu, pneumococcal and RSV vaccines           Past Medical History:     Past Medical History:   Diagnosis Date    Morbid obesity (H) 07/06/2020    Multifocal pneumonia 11/24/2023             Past Surgical History:      Past Surgical History:   Procedure Laterality Date    ANKLE SURGERY Left     HIP SURGERY Right              Social History:     Social History     Socioeconomic History    Marital status:      Spouse name: Not on file    Number of children: Not on file    Years of education: Not on file    Highest education level: Not on file   Occupational History    Not on file   Tobacco Use    Smoking status:  Never    Smokeless tobacco: Never   Vaping Use    Vaping status: Never Used   Substance and Sexual Activity    Alcohol use: Yes     Comment: 3 drinks per week    Drug use: Never    Sexual activity: Not Currently     Partners: Male   Other Topics Concern    Not on file   Social History Narrative    Not on file     Social Drivers of Health     Financial Resource Strain: Low Risk  (7/16/2024)    Financial Resource Strain     Within the past 12 months, have you or your family members you live with been unable to get utilities (heat, electricity) when it was really needed?: No   Food Insecurity: Low Risk  (7/16/2024)    Food Insecurity     Within the past 12 months, did you worry that your food would run out before you got money to buy more?: No     Within the past 12 months, did the food you bought just not last and you didn t have money to get more?: No   Transportation Needs: Low Risk  (7/16/2024)    Transportation Needs     Within the past 12 months, has lack of transportation kept you from medical appointments, getting your medicines, non-medical meetings or appointments, work, or from getting things that you need?: No   Physical Activity: Unknown (7/16/2024)    Exercise Vital Sign     Days of Exercise per Week: 1 day     Minutes of Exercise per Session: Not on file   Stress: No Stress Concern Present (7/16/2024)    [employer] [place] [employer] - Occupational Stress Questionnaire     Feeling of Stress : Not at all   Social Connections: Unknown (7/16/2024)    Social Connection and Isolation Panel [NHANES]     Frequency of Communication with Friends and Family: Not on file     Frequency of Social Gatherings with Friends and Family: Once a week     Attends Temple Services: Not on file     Active Member of Clubs or Organizations: Not on file     Attends Club or Organization Meetings: Not on file     Marital Status: Not on file   Interpersonal Safety: Low Risk  (11/15/2024)    Interpersonal Safety     Do  you feel physically and emotionally safe where you currently live?: Yes     Within the past 12 months, have you been hit, slapped, kicked or otherwise physically hurt by someone?: No     Within the past 12 months, have you been humiliated or emotionally abused in other ways by your partner or ex-partner?: No   Housing Stability: Low Risk  (7/16/2024)    Housing Stability     Do you have housing? : Yes     Are you worried about losing your housing?: No            Family History:   No family history on file.          Allergies:   No Known Allergies          Medications:     Current Outpatient Medications   Medication Sig Dispense Refill    albuterol (PROAIR HFA/PROVENTIL HFA/VENTOLIN HFA) 108 (90 Base) MCG/ACT inhaler Inhale 2 puffs into the lungs every 4 hours as needed for shortness of breath, wheezing or cough. 18 g 5    amLODIPine (NORVASC) 5 MG tablet Take 1 tablet (5 mg) by mouth daily 90 tablet 3    aspirin (ASA) 81 MG chewable tablet Take 81 mg by mouth daily Take 1 tab daily      atorvastatin (LIPITOR) 10 MG tablet Take 1 tablet (10 mg) by mouth daily 90 tablet 3    cyanocobalamin (VITAMIN B-12) 1000 MCG tablet Take 1,000 mcg by mouth daily      escitalopram (LEXAPRO) 20 MG tablet TAKE ONE AND ONE-HALF TABLETS DAILY 135 tablet 3    levothyroxine (SYNTHROID/LEVOTHROID) 50 MCG tablet Take 1 tablet (50 mcg) by mouth daily. 90 tablet 3    lisinopril (ZESTRIL) 40 MG tablet TAKE 1 TABLET DAILY 90 tablet 1    mometasone-formoterol (DULERA) 100-5 MCG/ACT inhaler Inhale 1-2 puffs into the lungs 2 times daily. 13 g 11    vitamin D3 (CHOLECALCIFEROL) 50 mcg (2000 units) tablet Take 1 tablet by mouth daily 1 tab by mouth daily       No current facility-administered medications for this visit.              Exam:   /75   Pulse 68   Resp 20   Wt 88 kg (194 lb)   LMP  (LMP Unknown)   SpO2 97%   BMI 36.66 kg/m      Gen: well-appearing, NAD  CV: RRR no murmur  Resp: Normal respiratory effort on room air. Clear to  auscultation  Skin: no obvious rashes on gross evaluation  Neuro: alert and appropriate, no focal abnormalities         Data:     I have personally reviewed all pertinent labs, imaging studies and PFT results unless otherwise noted.    Labs:  Eosinophils 11/2023- 100    Imaging:  CT chest 4/2025  Stable RLL nodule    CT chest w/o 2/29/24-  1.  Previously seen infiltrates have resolved.   2.  Stable nodule with a central calcification suggestive of   granulomatous change in the superior segment of the right lower lobe.   Consider an additional 12-month follow-up study to confirm stability.   3.  No gross adenopathy demonstrated in the absence of contrast. This   has probably improved since previous and may have been reactive.     CT PE 11/24/2023-  IMPRESSION:  1.  No evidence of pulmonary embolus.  2.  Bilateral upper and lower lobe pneumonia.  3.  1.5 cm nodule right upper lobe has some features suggestive of  acute granulomatous disease. Three-month follow-up chest CT is  recommended.  4.  Mild right hilar and mediastinal lymphadenopathy can also be  reassessed on follow-up.    PFT: 1/22/2024- mild obstruction with significant bronchodilator response. Normal lung volumes and diffusion capacity.

## 2025-04-29 NOTE — PATIENT INSTRUCTIONS
Your asthma sounds very stable and mild. Continue Dulera 1-2 puffs twice daily as needed for phlegm, or albuterol as needed  Your CT chest is stable from 11/2023, the nodule appears benign, likely granulomatous. Suspect old fungal exposure.   Follow up as needed

## 2025-05-14 DIAGNOSIS — E78.2 MIXED HYPERLIPIDEMIA: ICD-10-CM

## 2025-05-15 RX ORDER — ATORVASTATIN CALCIUM 10 MG/1
10 TABLET, FILM COATED ORAL DAILY
Qty: 90 TABLET | Refills: 0 | Status: SHIPPED | OUTPATIENT
Start: 2025-05-15

## 2025-06-11 ENCOUNTER — TELEPHONE (OUTPATIENT)
Dept: NEUROLOGY | Facility: CLINIC | Age: 80
End: 2025-06-11
Payer: MEDICARE

## 2025-06-11 NOTE — PROGRESS NOTES
ESTABLISHED PATIENT NEUROLOGY NOTE    DATE OF VISIT: 6/12/2025  CLINIC LOCATION: [facility]  MRN: [id number]  PATIENT NAME: Cami Turner  YOB: 1945    REASON FOR VISIT: No chief complaint on file.    SUBJECTIVE:                                                      HISTORY OF PRESENT ILLNESS: Patient is here to follow up regarding mild cognitive impairment.  The last visit was on 12/12/2024.  Please refer to my initial/other prior notes for further information.  Accompanied by her caregiver.    Since the last visit, the patient reports ***.  Caregiver ***.  The patient is on B12 replacement.  She denies new neurological symptoms.    Recommended laboratory evaluation to check for possibility of Alzheimer's disease was not done.  EXAM:                                                    Physical Exam:   Vitals: LMP  (LMP Unknown)   [place] Cognitive Assessment:          [place] Cognitive Assessment Score:   /30.   General: pt is in NAD, cooperative.  Skin: normal turgor, moist mucous membranes, no lesions/rashes noticed.  HEENT: ATNC, white sclera, normal conjunctiva.  Respiratory: Symmetric lung excursion, no accessory respiratory muscle use.  Abdomen: Non distended.  Neurological: awake, cooperative, follows commands, face is symmetric, tongue is midline, equal moves all extremities, no dysmetria bilaterally.  Gait was not checked.  ASSESSMENT AND PLAN:                                                    Assessment: 79 year old female patient presents for follow-up of mild cognitive impairment and vitamin B12 deficiency.  The patient did not complete the labs to check for possibility of Alzheimer's disease, and encouraged her to do so.  Her MoCA today is ***/30, compared to a previous range of 18-20/30.    Diagnoses:    ICD-10-CM    1. Mild cognitive impairment  G31.84       2. Vitamin B12 deficiency (non anemic)  E53.8         Plan:  There are no Patient Instructions on file for  this visit.    Total Time: *** minutes spent on the date of the encounter doing chart review, history and exam, documentation and further activities per the note.    Gilson Casarez MD  [facility]  (Chart documentation was completed in part with Dragon voice-recognition software. Even though reviewed, some grammatical, spelling, and word errors may remain.)

## 2025-06-11 NOTE — TELEPHONE ENCOUNTER
Attempted to reach patient to remind them about appointment scheduled with Gilson Casarez MD on 6/12/25 in our [place] clinic.    A voicemail was left with a call back number if the patient has questions or would like to reschedule.

## 2025-06-12 ENCOUNTER — OFFICE VISIT (OUTPATIENT)
Dept: NEUROLOGY | Facility: CLINIC | Age: 80
End: 2025-06-12
Payer: MEDICARE

## 2025-06-12 VITALS — HEART RATE: 81 BPM | OXYGEN SATURATION: 96 % | SYSTOLIC BLOOD PRESSURE: 134 MMHG | DIASTOLIC BLOOD PRESSURE: 77 MMHG

## 2025-06-12 DIAGNOSIS — E53.8 VITAMIN B12 DEFICIENCY (NON ANEMIC): ICD-10-CM

## 2025-06-12 DIAGNOSIS — G31.84 MILD COGNITIVE IMPAIRMENT: Primary | ICD-10-CM

## 2025-06-12 NOTE — PATIENT INSTRUCTIONS
AFTER VISIT SUMMARY (AVS):    At today's visit we thoroughly discussed current symptoms, available treatment options, and the plan.    We decided to pursue recommended labs while you work on lifestyle modifications.  Contact phone number for walk-in lab at [facility] is [phone].    Next follow-up appointment is in the next 6 months or earlier if needed.    Please do not hesitate to call me with any questions or concerns.    Thanks.

## 2025-06-12 NOTE — PROGRESS NOTES
ESTABLISHED PATIENT NEUROLOGY NOTE    DATE OF VISIT: 6/12/2025  CLINIC LOCATION: [facility]  MRN: [id number]  PATIENT NAME: Cami Turner  YOB: 1945    REASON FOR VISIT:   Chief Complaint   Patient presents with    Follow Up     stable     SUBJECTIVE:                                                      HISTORY OF PRESENT ILLNESS: Patient is here to follow up regarding mild cognitive impairment.  The last visit was on 12/12/2024.  Please refer to my initial/other prior notes for further information.  Accompanied by her caregiver.    Since the last visit, the patient reports that her memory is stable.  Caregiver agrees.  The patient is on B12 replacement.  She denies new neurological symptoms.    Recommended laboratory evaluation to check for possibility of Alzheimer's disease was not done.  EXAM:                                                    Physical Exam:   Vitals: /77   Pulse 81   LMP  (LMP Unknown)   SpO2 96%     General: pt is in NAD, cooperative.  Skin: normal turgor, moist mucous membranes, no lesions/rashes noticed.  HEENT: ATNC, white sclera, normal conjunctiva.  Respiratory: Symmetric lung excursion, no accessory respiratory muscle use.  Abdomen: Non distended.  Neurological: awake, cooperative, follows commands, face is symmetric, tongue is midline, equal moves all extremities, no dysmetria bilaterally.  Gait was not checked.  ASSESSMENT AND PLAN:                                                    Assessment: 79 year old female patient presents for follow-up of mild cognitive impairment and vitamin B12 deficiency.  The patient did not complete the labs to check for possibility of Alzheimer's disease, and I encouraged her to do so.  We also added phosphorylated tau 217/beta amyloid 42 ratio.  Her MoCA scores' range is 18-20/30.  Will plan to repeat MoCA next time she comes in 6 months.  We also discussed lifestyle modifications to help cognitive  function.    Diagnoses:    ICD-10-CM    1. Mild cognitive impairment  G31.84       2. Vitamin B12 deficiency (non anemic)  E53.8         Plan: At today's visit we thoroughly discussed current symptoms, available treatment options, and the plan.    We decided to pursue recommended labs while she works on lifestyle modifications.  I provided the contact phone number for walk-in lab at [facility].    Next follow-up appointment is in the next 6 months or earlier if needed.    Total Time: 21 minutes spent on the date of the encounter doing chart review, history and exam, documentation and further activities per the note.    Gilson Casarez MD  [facility]  (Chart documentation was completed in part with Dragon voice-recognition software. Even though reviewed, some grammatical, spelling, and word errors may remain.)

## 2025-06-16 ENCOUNTER — PATIENT OUTREACH (OUTPATIENT)
Dept: CARE COORDINATION | Facility: CLINIC | Age: 80
End: 2025-06-16
Payer: MEDICARE

## 2025-06-23 DIAGNOSIS — I10 ESSENTIAL HYPERTENSION: ICD-10-CM

## 2025-06-24 RX ORDER — AMLODIPINE BESYLATE 5 MG/1
5 TABLET ORAL DAILY
Qty: 90 TABLET | Refills: 3 | Status: SHIPPED | OUTPATIENT
Start: 2025-06-24

## 2025-06-26 ENCOUNTER — LAB (OUTPATIENT)
Dept: LAB | Facility: CLINIC | Age: 80
End: 2025-06-26
Payer: MEDICARE

## 2025-06-26 DIAGNOSIS — G31.84 MILD COGNITIVE IMPAIRMENT: ICD-10-CM

## 2025-06-26 LAB
LAB ORDER RESULT STATUS: NORMAL
Lab: NORMAL
PERFORMING LABORATORY: NORMAL
TEST NAME: NORMAL

## 2025-06-26 PROCEDURE — 36415 COLL VENOUS BLD VENIPUNCTURE: CPT

## 2025-07-01 LAB — LABCORP INTERFACED MISCELLANEOUS TEST RESULT: NORMAL

## 2025-07-02 ENCOUNTER — RESULTS FOLLOW-UP (OUTPATIENT)
Dept: NEUROLOGY | Facility: CLINIC | Age: 80
End: 2025-07-02

## 2025-08-30 ENCOUNTER — HEALTH MAINTENANCE LETTER (OUTPATIENT)
Age: 80
End: 2025-08-30